# Patient Record
Sex: FEMALE | Race: WHITE | Employment: OTHER | URBAN - NONMETROPOLITAN AREA
[De-identification: names, ages, dates, MRNs, and addresses within clinical notes are randomized per-mention and may not be internally consistent; named-entity substitution may affect disease eponyms.]

---

## 2018-01-27 ENCOUNTER — HOSPITAL ENCOUNTER (EMERGENCY)
Age: 63
Discharge: HOME | End: 2018-01-27
Attending: EMERGENCY MEDICINE

## 2018-01-27 ENCOUNTER — APPOINTMENT (OUTPATIENT)
Dept: GENERAL RADIOLOGY | Age: 63
End: 2018-01-27

## 2018-01-27 VITALS
RESPIRATION RATE: 16 BRPM | TEMPERATURE: 99.4 F | DIASTOLIC BLOOD PRESSURE: 91 MMHG | OXYGEN SATURATION: 94 % | SYSTOLIC BLOOD PRESSURE: 156 MMHG | HEART RATE: 79 BPM

## 2018-01-27 DIAGNOSIS — R05.9 COUGH: Primary | ICD-10-CM

## 2018-01-27 LAB
DIRECT EXAM: NORMAL
Lab: NORMAL
SPECIMEN DESCRIPTION: NORMAL
STATUS: NORMAL

## 2018-01-27 PROCEDURE — 99283 EMERGENCY DEPT VISIT LOW MDM: CPT

## 2018-01-27 PROCEDURE — 87804 INFLUENZA ASSAY W/OPTIC: CPT

## 2018-01-27 RX ORDER — AZITHROMYCIN 250 MG/1
250 TABLET, FILM COATED ORAL DAILY
Qty: 6 TABLET | Refills: 0 | Status: SHIPPED | OUTPATIENT
Start: 2018-01-27

## 2018-01-27 ASSESSMENT — PAIN DESCRIPTION - LOCATION: LOCATION: GENERALIZED

## 2018-01-27 ASSESSMENT — PAIN SCALES - GENERAL: PAINLEVEL_OUTOF10: 7

## 2018-01-27 ASSESSMENT — ENCOUNTER SYMPTOMS
BACK PAIN: 0
COUGH: 1
GASTROINTESTINAL NEGATIVE: 1

## 2018-01-27 ASSESSMENT — PAIN DESCRIPTION - PAIN TYPE: TYPE: ACUTE PAIN

## 2018-01-28 NOTE — ED PROVIDER NOTES
This is a 77-year-old female who presents with flulike symptoms that began this morning. She's had subjective fevers at home cough and sore throat. She does have a headache worse when she is coughing. She denies any chest pain or shortness of breath. Her largest complaint is diffuse myalgias. She has been tolerating by mouth and has a decreased appetite but has been drinking increased fluids. She is urinating without difficulty she denies any nausea or vomiting. Has had multiple sick contacts with similar symptoms. Nurses notes reviewed including family history and social history. Review of Systems   Constitutional: Negative. Respiratory: Positive for cough. Cardiovascular: Negative. Gastrointestinal: Negative. Genitourinary: Negative. Musculoskeletal: Positive for arthralgias and myalgias. Negative for back pain and neck pain. Neurological: Positive for headaches. Hematological: Negative. Physical Exam   Constitutional: She is oriented to person, place, and time and well-developed, well-nourished, and in no distress. HENT:   Head: Normocephalic and atraumatic. Eyes: Conjunctivae and EOM are normal. Pupils are equal, round, and reactive to light. Neck: Normal range of motion. Neck supple. Cardiovascular: Normal rate, regular rhythm and normal heart sounds. Pulmonary/Chest: Effort normal and breath sounds normal.   Abdominal: Soft. Bowel sounds are normal.   Musculoskeletal: Normal range of motion. Neurological: She is alert and oriented to person, place, and time. Gait normal. GCS score is 15. Skin: Skin is warm and dry. Flu swab is negative.   Patient is refusing chest x-ray          Jose Randle MD  01/27/18 2006       Jose Randle MD  01/27/18 2007

## 2022-05-05 ENCOUNTER — HOSPITAL ENCOUNTER (OUTPATIENT)
Age: 67
Setting detail: SPECIMEN
Discharge: HOME | End: 2022-05-05

## 2022-05-05 LAB
ABSOLUTE EOS #: 0.05 K/UL (ref 0–0.44)
ABSOLUTE IMMATURE GRANULOCYTE: 0.08 K/UL (ref 0–0.3)
ABSOLUTE LYMPH #: 2.24 K/UL (ref 1.1–3.7)
ABSOLUTE MONO #: 1.32 K/UL (ref 0.1–1.2)
BASOPHILS # BLD: 0 % (ref 0–2)
BASOPHILS ABSOLUTE: 0.05 K/UL (ref 0–0.2)
EOSINOPHILS RELATIVE PERCENT: 0 % (ref 1–4)
HCT VFR BLD CALC: 39.4 % (ref 36.3–47.1)
HEMOGLOBIN: 12.7 G/DL (ref 11.9–15.1)
IMMATURE GRANULOCYTES: 1 %
LYMPHOCYTES # BLD: 15 % (ref 24–43)
MCH RBC QN AUTO: 31.4 PG (ref 25.2–33.5)
MCHC RBC AUTO-ENTMCNC: 32.2 G/DL (ref 28.4–34.8)
MCV RBC AUTO: 97.3 FL (ref 82.6–102.9)
MONOCYTES # BLD: 9 % (ref 3–12)
NRBC AUTOMATED: 0 PER 100 WBC
PDW BLD-RTO: 13.1 % (ref 11.8–14.4)
PLATELET # BLD: 411 K/UL (ref 138–453)
PMV BLD AUTO: 10.1 FL (ref 8.1–13.5)
RBC # BLD: 4.05 M/UL (ref 3.95–5.11)
SEG NEUTROPHILS: 75 % (ref 36–65)
SEGMENTED NEUTROPHILS ABSOLUTE COUNT: 11.43 K/UL (ref 1.5–8.1)
WBC # BLD: 15.2 K/UL (ref 3.5–11.3)

## 2022-05-06 LAB
ALBUMIN SERPL-MCNC: 4.8 G/DL (ref 3.5–5.2)
ALBUMIN/GLOBULIN RATIO: 1.9 (ref 1–2.5)
ALP BLD-CCNC: 91 U/L (ref 35–104)
ALT SERPL-CCNC: 17 U/L (ref 5–33)
ANION GAP SERPL CALCULATED.3IONS-SCNC: 19 MMOL/L (ref 9–17)
AST SERPL-CCNC: 17 U/L
BILIRUB SERPL-MCNC: 0.27 MG/DL (ref 0.3–1.2)
BUN BLDV-MCNC: 27 MG/DL (ref 8–23)
CALCIUM SERPL-MCNC: 9.6 MG/DL (ref 8.6–10.4)
CHLORIDE BLD-SCNC: 96 MMOL/L (ref 98–107)
CHOLESTEROL, FASTING: 193 MG/DL
CHOLESTEROL/HDL RATIO: 3.4
CO2: 20 MMOL/L (ref 20–31)
CREAT SERPL-MCNC: 1.06 MG/DL (ref 0.5–0.9)
ESTIMATED AVERAGE GLUCOSE: 146 MG/DL
GFR AFRICAN AMERICAN: >60 ML/MIN
GFR NON-AFRICAN AMERICAN: 52 ML/MIN
GFR SERPL CREATININE-BSD FRML MDRD: ABNORMAL ML/MIN/{1.73_M2}
GLUCOSE BLD-MCNC: 101 MG/DL (ref 70–99)
HBA1C MFR BLD: 6.7 % (ref 4–6)
HDLC SERPL-MCNC: 57 MG/DL
LDL CHOLESTEROL: 116 MG/DL (ref 0–130)
POTASSIUM SERPL-SCNC: 4.4 MMOL/L (ref 3.7–5.3)
SODIUM BLD-SCNC: 135 MMOL/L (ref 135–144)
T3 FREE: 4.17 PG/ML (ref 2.02–4.43)
T4 TOTAL: 6.5 UG/DL (ref 4.5–10.9)
TOTAL PROTEIN: 7.3 G/DL (ref 6.4–8.3)
TRIGLYCERIDE, FASTING: 100 MG/DL
TSH SERPL DL<=0.05 MIU/L-ACNC: 3.06 UIU/ML (ref 0.3–5)
VITAMIN D 25-HYDROXY: 57.6 NG/ML

## 2022-08-05 ENCOUNTER — HOSPITAL ENCOUNTER (OUTPATIENT)
Dept: LAB | Age: 67
Setting detail: SPECIMEN
Discharge: HOME | End: 2022-08-05
Payer: COMMERCIAL

## 2022-08-05 PROCEDURE — U0005 INFEC AGEN DETEC AMPLI PROBE: HCPCS

## 2022-08-05 PROCEDURE — C9803 HOPD COVID-19 SPEC COLLECT: HCPCS

## 2022-08-05 PROCEDURE — U0003 INFECTIOUS AGENT DETECTION BY NUCLEIC ACID (DNA OR RNA); SEVERE ACUTE RESPIRATORY SYNDROME CORONAVIRUS 2 (SARS-COV-2) (CORONAVIRUS DISEASE [COVID-19]), AMPLIFIED PROBE TECHNIQUE, MAKING USE OF HIGH THROUGHPUT TECHNOLOGIES AS DESCRIBED BY CMS-2020-01-R: HCPCS

## 2022-08-06 LAB
SARS-COV-2: NORMAL
SARS-COV-2: NOT DETECTED
SOURCE: NORMAL

## 2022-08-12 ENCOUNTER — HOSPITAL ENCOUNTER (OUTPATIENT)
Dept: LAB | Age: 67
Discharge: HOME | End: 2022-08-12
Payer: COMMERCIAL

## 2022-08-12 PROCEDURE — C9803 HOPD COVID-19 SPEC COLLECT: HCPCS

## 2022-08-12 PROCEDURE — U0005 INFEC AGEN DETEC AMPLI PROBE: HCPCS

## 2022-08-12 PROCEDURE — U0003 INFECTIOUS AGENT DETECTION BY NUCLEIC ACID (DNA OR RNA); SEVERE ACUTE RESPIRATORY SYNDROME CORONAVIRUS 2 (SARS-COV-2) (CORONAVIRUS DISEASE [COVID-19]), AMPLIFIED PROBE TECHNIQUE, MAKING USE OF HIGH THROUGHPUT TECHNOLOGIES AS DESCRIBED BY CMS-2020-01-R: HCPCS

## 2022-08-13 LAB
SARS-COV-2: NORMAL
SARS-COV-2: NOT DETECTED
SOURCE: NORMAL

## 2022-12-08 ENCOUNTER — HOSPITAL ENCOUNTER (OUTPATIENT)
Dept: PHYSICAL THERAPY | Age: 67
Setting detail: THERAPIES SERIES
Discharge: HOME | End: 2022-12-08
Payer: COMMERCIAL

## 2022-12-08 PROCEDURE — 97110 THERAPEUTIC EXERCISES: CPT

## 2022-12-08 PROCEDURE — 97140 MANUAL THERAPY 1/> REGIONS: CPT

## 2022-12-08 PROCEDURE — 97161 PT EVAL LOW COMPLEX 20 MIN: CPT

## 2022-12-08 ASSESSMENT — PAIN SCALES - QUEBEC BACK PAIN DISABILITY SCALE
STAND UP FOR 20 TO 30 MINUTES: 3
SLEEP THROUGH THE NIGHT: 2
PULL OR PUSH HEAVY DOORS: 2
TOTAL SCORE: 55
MAKE YOUR BED: 3
GET OUT OF BED: 2
TAKE FOOD OUT OF THE REFRIGERATOR: 2
QUEBEC CMS MODIFIER: CK
BEND OVER TO CLEAN THE BATHTUB: 4
LIFT AND CARRY A HEAVY SUITCASE: 4
RUN ONE BLOCK OR 100M: 5
SIT IN A CHAIR FOR SEVERAL HOURS: 2
REACH UP TO HIGH SHELVES: 1
WALK A FEW BLOCKS OR 300 TO 400M: 3
PUT ON SOCKS OR PANYHOSE: 2
WALK SEVERAL KILOMETERS  OR MILES: 5
THROW A BALL: 5
TURN OVER IN BED: 2
CARRY TWO BAGS OF GROCERIES: 2
CLIMB ONE FLIGHT OF STAIRS: 3
MOVE A CHAIR: 2
RIDE IN A CAR: 1
QUEBEC DISABILITY INDEX: 40-59%

## 2022-12-08 NOTE — PLAN OF CARE
[facility]           Phone: [phone]             Outpatient Physical Therapy  Fax: [phone]                                           Date: 2022  Patient: Rob Damon : 1955 CSN #: [id number]   Referring Physician: Arcadio Hurtado      [x] Plan of Care   [] Updated Plan of Care    Dates of Service to Include: 2022 to 23    Diagnosis:  R hip pain, M25.551    Rehab (Treatment) Diagnosis:  Low back and R hip pain             Onset Date:       Attendance  Total # of Visits to Date: 1 No Show: 0 Canceled Appointment: 0    Assessment  Assessment: Patient is 74year old female with dx of R hip pain who presents with lower back and R hip pain 8/10 at worst after working all day on her feet. Pt stands with lumbar hyperlordosis and B anterior pelvic tilt, Moderate TTP L5/S1, R PSIS, R greater trochanter; (-) B slump, SLR, (-) B CHITO, good pelvic alignment, relief with manual lumbar traction with therapy ball. Patient with decreased lumbar AROM flexion: 6 in from floor with pain and B SB: to knees with no pain. Patient with decreased core strength: 3+/5 and decreased R hip flex: 3/5 compared to L hip flex: 4-/5. Patient to benefit from aquatic physical therapy to offload the spine and the R hip and to improve strength and ROM to return to PLOF. Goals  Short Term Goals  Time Frame for Short Term Goals: 3 weeks  Short Term Goal 1: Patient to initiate HEP for improved lumbar ROM and core strength. Short Term Goal 2: Patient to tolerate 45 min of aquatic exercise to decrease low back and L hip pain. Short Term Goal 3: Patient to be instructed in gentle core and B hip strengthening to improve lumbar stability. Long Term Goals  Time Frame for Long Term Goals : 6 weeks  Long Term Goal 1: Patient to be safe and independent with HEP.   Long Term Goal 2: Patient to have improved core and B hip strength >/=4/5 all planes for improved lumbar stability. Long Term Goal 3: Patient to have improved lumbar AROM flexion: 4in from floor with no increase in pain. Long Term Goal 4: Patient to report >/=75% improvement in symptoms for improved QOL.      Prognosis  Therapy Prognosis: Good    Treatment Plan   Plan Frequency: 2  Plan weeks: 4  [x] HP/CP      [x] Electrical Stim   [x] Therapeutic Exercise      [x] Gait Training  [x] Aquatics   [x] Ultrasound         [x] Patient Education/HEP   [x] Manual Therapy  [] Traction    [x] Neuro-benedict        [x] Soft Tissue Mobs            [] Home TENS  [] Iontophoresis    [] Orthotic casting/fitting      [] Dry Needling  [] Blood Flow Restriction             Electronically signed by: Jose Angel Alexandre PT, DPT    Date: 12/8/2022      ______________________________________ Date: 12/8/2022   Physician Signature

## 2022-12-08 NOTE — PROGRESS NOTES
Phone: [address]          Fax: [phone]                      Outpatient Physical Therapy                                                                      Evaluation  Date: 2022  Patient: Akhil Ley  : 1955  CSN #: [id number]    Referring Physician: Agnela Taylor PA-C     Medical Diagnosis: R hip pain, M25.551    Treatment Diagnosis: Low back and R hip pain  PT Insurance Information: [place]  Total # of Visits Approved: 12   Total # of Visits to Date: 1  No Show: 0  Canceled Appointment: 0     Subjective  Subjective: Patient reports hx of back pain for years and R hip pain started recently. Had xrays for R hip on Friday that showed trochanteric bursitis and liner of CAMERON is wearing down. MRI pending insurance approval. 8/10 pain after work and being on her feet all day. Heat packs and cold packs that provide temporary relief but then it comes right back. Pain medications don't help.   Additional Pertinent Hx: Hx of B CAMERON's, arthritis, HTN, anxiety    Observations:   General Observations  Description: Pt stands with lumbar hyperlordosis and B anterior pelvic tilt, Moderate TTP L5/S1, R PSIS, R greater trochanter; (-) B slump, SLR, (-) B CHITO, good pelvic alignment, relief with manual lumbar traction with therapy ball    Objective    Strength       Strength LLE  L Hip Flexion: 4-/5  L Hip ABduction: 4/5  L Hip ADduction: 4/5  L Knee Flexion: 4/5  L Knee Extension: 4-/5  L Ankle Dorsiflexion: 4/5       Lumbar Assessment     AROM Lumbar Spine   Lumbar spine general AROM: flexion: 6 in from floor, no pain; B SB: to knees, no pain     Special Tests:   Strength RLE  R Hip Flexion: 3/5  R Hip ABduction: 4/5  R Hip ADduction: 4/5  R Knee Flexion: 4/5  R Knee Extension: 4-/5  R Ankle Dorsiflexion: 4-/5  Strength LLE  L Hip Flexion: 4-/5  L Hip ABduction: 4/5  L Hip ADduction: 4/5  L Knee Flexion: 4/5  L Knee Extension: 4-/5  L Ankle Dorsiflexion: 4/5      Exercises:  Exercise 1: HEP: PPT, marching, glut sets, LTR    Manual:  Manual Traction: Gentle manual lumbar traction with therapy ball with relief of pain noted       Functional Outcome Measures  Get out of bed: Somewhat difficult  Sleep through the night: Somewhat difficult  Turn over in bed: Somewhat difficult  Ride in a car: Minimally difficult  Stand up for 20-30 minutes: Fairly difficult  Sit in a chair for several hours: Somewhat difficult  Climb one flight of stairs: Fairly difficult  Walk a few blocks (300-400 m): Fairly difficult  Walk several kilometers - miles: Unable to do  Reach up to high shelves: Minimally difficult  Throw a ball: Unable to do  Run one block (about 100 m): Unable to do  Take food out of the refrigerator: Somewhat difficult  Make your bed: Fairly difficult  Put on socks (pantyhose): Somewhat difficult  Bend over to clean the bathtub: Very difficult  Move a chair: Somewhat difficult  Pull or push heavy doors: Somewhat difficult  Carry two bags of groceries: Somewhat difficult  Lift and carry a heavy suitcase: Very difficult  Sarah Total Score: 55      Assessment  Assessment: Patient is 74year old female with dx of R hip pain who presents with lower back and R hip pain 8/10 at worst after working all day on her feet. Pt stands with lumbar hyperlordosis and B anterior pelvic tilt, Moderate TTP L5/S1, R PSIS, R greater trochanter; (-) B slump, SLR, (-) B HCITO, good pelvic alignment, relief with manual lumbar traction with therapy ball. Patient with decreased lumbar AROM flexion: 6 in from floor with pain and B SB: to knees with no pain. Patient with decreased core strength: 3+/5 and decreased R hip flex: 3/5 compared to L hip flex: 4-/5. Patient to benefit from aquatic physical therapy to offload the spine and the R hip and to improve strength and ROM to return to PLOF.   Therapy Prognosis: Good        Decision Making: Low Complexity    Patient Education  PT eval, POC, HEP   Pt verbalized/demonstrated good understanding:     [X] Yes         [] No, pt required further clarification. Goals  Short Term Goals  Time Frame for Short Term Goals: 3 weeks  Short Term Goal 1: Patient to initiate HEP for improved lumbar ROM and core strength. Short Term Goal 2: Patient to tolerate 45 min of aquatic exercise to decrease low back and L hip pain. Short Term Goal 3: Patient to be instructed in gentle core and B hip strengthening to improve lumbar stability. Long Term Goals  Time Frame for Long Term Goals : 6 weeks  Long Term Goal 1: Patient to be safe and independent with HEP. Long Term Goal 2: Patient to have improved core and B hip strength >/=4/5 all planes for improved lumbar stability. Long Term Goal 3: Patient to have improved lumbar AROM flexion: 4in from floor with no increase in pain. Long Term Goal 4: Patient to report >/=75% improvement in symptoms for improved QOL.         Minutes Tracking:  Time In: 1330  Time Out: 1410  Minutes: 40  Timed Code Treatment Minutes: [address], PT, DPT    12/8/2022

## 2022-12-12 ENCOUNTER — HOSPITAL ENCOUNTER (OUTPATIENT)
Dept: PHYSICAL THERAPY | Age: 67
Setting detail: THERAPIES SERIES
Discharge: HOME | End: 2022-12-12
Payer: COMMERCIAL

## 2022-12-12 PROCEDURE — 97113 AQUATIC THERAPY/EXERCISES: CPT

## 2022-12-12 NOTE — PROGRESS NOTES
Phone: [facility]           Fax: [phone]                           Outpatient Physical Therapy                                                                            Daily Note    Patient: Delpha Hodgkins : 1955  CSN #: [id number]   Referring Physician: Aby Peguero PA-C    Date: 2022    Diagnosis: R hip pain, M25.551  Treatment Diagnosis: Low back and R hip pain    PT Insurance Information: [place]  Total # of Visits Approved: 12 Per Physician Order  Total # of Visits to Date: 2  No Show: 0  Canceled Appointment: 0      Pre-Treatment Pain:  9/10  Subjective: Pt reports she just got off work and pain is really bad right now, 9/10. Pt reports she can pin point it \"just to the right of my lowest vertebrae. \"    Exercises:  Exercise 1: HEP: PPT, marching, glut sets, LTR  Exercise 2: Aquatics to reduce pain in offloaded enviornment in order to improve ROM and strength:  Exercise 3: Shallow water walking forward, lateral and backwards x 4 laps each + 3 laps fwd at end of tx. Exercise 4: Sink therex x 10, FSU/LSU x 10 semi deep,  Exercise 5: Seated jet massage 5 minutes to redce muscle tension and pain, seated B LE therex x 10  Exercise 6: HS stretch 20\" x 3    Assessment  Assessment: Initiated light core and LE therex this date, with fair+ tolerance. Pt reported a decrease in pain to 5/10 following tx. Will continue to progress as tolerated. Activity Tolerance  Activity Tolerance: Patient limited by pain, Patient limited by fatigue    Patient Education  Patient Education: Initiated aquatic therapy this date with instrution on safety protocol and expectations. Pt verbalized/demonstrated good understanding:     [x] Yes         [] No, pt required further clarification.     Post Treatment Pain:  5/10      Plan  Plan Frequency: 2  Plan weeks: 4       Goals  (Total # of Visits to Date: 2)      Short Term Goals  Time Frame for Short Term Goals: 3 weeks  Short Term Goal 1: Patient to initiate HEP for improved lumbar ROM and core strength. Short Term Goal 2: Patient to tolerate 45 min of aquatic exercise to decrease low back and L hip pain. Short Term Goal 3: Patient to be instructed in gentle core and B hip strengthening to improve lumbar stability--met    Long Term Goals  Time Frame for Long Term Goals : 6 weeks  Long Term Goal 1: Patient to be safe and independent with HEP. Long Term Goal 2: Patient to have improved core and B hip strength >/=4/5 all planes for improved lumbar stability. Long Term Goal 3: Patient to have improved lumbar AROM flexion: 4in from floor with no increase in pain. Long Term Goal 4: Patient to report >/=75% improvement in symptoms for improved QOL.     Minutes Tracking:  Time In: 8904  Time Out: 1622  Minutes: 43  Timed Code Treatment Minutes: [address] 26     Date: 12/12/2022

## 2022-12-13 ENCOUNTER — HOSPITAL ENCOUNTER (OUTPATIENT)
Dept: PHYSICAL THERAPY | Age: 67
Setting detail: THERAPIES SERIES
Discharge: HOME | End: 2022-12-13
Payer: COMMERCIAL

## 2022-12-13 PROCEDURE — 97113 AQUATIC THERAPY/EXERCISES: CPT

## 2022-12-13 NOTE — PROGRESS NOTES
Phone: [facility]           Fax: [phone]                           Outpatient Physical Therapy                                                                            Daily Note    Patient: Lupe Puente : 1955  CSN #: [id number]   Referring Physician: Rochelle Hudson PA-C    Date: 2022    Diagnosis: R hip pain, M25.551  Treatment Diagnosis: Low back and R hip pain    PT Insurance Information: [place]  Total # of Visits Approved: 12 Per Physician Order  Total # of Visits to Date: 3  No Show: 0  Canceled Appointment: 0      Pre-Treatment Pain:  7/10  Subjective: Pt reports current LBP and R hip pain 7/10. \"Not too bad. \"    Exercises:  Exercise 1: HEP: PPT, marching, glut sets, LTR  Exercise 2: Aquatics to reduce pain in offloaded enviornment in order to improve ROM and strength:  Exercise 3: Shallow water walking forward, lateral and backwards x 4 laps each + 3 laps fwd at end of tx. Exercise 4: Sink therex x 10, FSU/LSU x 10 semi deep, 4 way hip  B LE x 10 BTB  Exercise 5: Seated jet massage 5 minutes to redce muscle tension and pain, seated B LE therex x 15, STS 5# x 10  Exercise 6: HS stretch 20\" x 3  Exercise 7: PTP rows/ext x 15, 90/90 with double dumbbells x 15  Exercise 8: Deep water bike with 3# x 3 minutes, deep well traction x 4 minutes    Assessment  Assessment: Continued to progress core and LE therex in offloaded enviornment as tolerated. Initiated deep well bike and traction with relief reported, 5/10 post tx. Activity Tolerance  Activity Tolerance: Patient limited by pain, Patient limited by fatigue    Patient Education  Patient Education: Initiated new therex--good return demonstration noted. Pt verbalized/demonstrated good understanding:     [x] Yes         [] No, pt required further clarification.     Post Treatment Pain:  5/10      Plan  Plan Frequency: 2  Plan weeks: 4       Goals  (Total # of Visits to Date: 3)      Short Term Goals  Time Frame for Short Term Goals: 3 weeks  Short Term Goal 1: Patient to initiate HEP for improved lumbar ROM and core strength. Short Term Goal 2: Patient to tolerate 45 min of aquatic exercise to decrease low back and L hip pain. Short Term Goal 3: Patient to be instructed in gentle core and B hip strengthening to improve lumbar stability--met    Long Term Goals  Time Frame for Long Term Goals : 6 weeks  Long Term Goal 1: Patient to be safe and independent with HEP. Long Term Goal 2: Patient to have improved core and B hip strength >/=4/5 all planes for improved lumbar stability. Long Term Goal 3: Patient to have improved lumbar AROM flexion: 4in from floor with no increase in pain. Long Term Goal 4: Patient to report >/=75% improvement in symptoms for improved QOL.     Minutes Tracking:  Time In: 1230  Time Out: 1330  Minutes: 60  Timed Code Treatment Minutes: [id number] [id number]     Date: 12/13/2022

## 2022-12-22 ENCOUNTER — APPOINTMENT (OUTPATIENT)
Dept: PHYSICAL THERAPY | Age: 67
End: 2022-12-22
Payer: COMMERCIAL

## 2022-12-27 ENCOUNTER — HOSPITAL ENCOUNTER (OUTPATIENT)
Dept: PHYSICAL THERAPY | Age: 67
Setting detail: THERAPIES SERIES
Discharge: HOME | End: 2022-12-27
Payer: COMMERCIAL

## 2022-12-27 NOTE — PROGRESS NOTES
[facility]  Inpatient/Observation/Outpatient Rehabilitation    Date: 2022  Patient Name: Delpha Hodgkins       [] Inpatient Acute/Observation       [x]  Outpatient  : 1955        [x] Pt cancelled due to:  [] No Reason Given   [] Sick/ill   [x] Other:  Pt left a voicemail stating she was \"Unable to make it. \"    Therapist/Assistant will attempt to see this patient, at our earliest opportunity.        Nolan Lanier, [place] [address] Date: 2022

## 2022-12-29 ENCOUNTER — HOSPITAL ENCOUNTER (OUTPATIENT)
Dept: PHYSICAL THERAPY | Age: 67
Setting detail: THERAPIES SERIES
Discharge: HOME | End: 2022-12-29
Payer: COMMERCIAL

## 2022-12-29 NOTE — PROGRESS NOTES
[facility]  Inpatient/Observation/Outpatient Rehabilitation    Date: 2022  Patient Name: Maria C Marcos       [] Inpatient Acute/Observation       [x]  Outpatient  : 1955     [x] Pt cancelled due to:  [] No Reason Given   [] Sick/ill   [x] Other:  No reason given to  for today's cancelled visit, however PTA did speak with patient earlier in the week and she stated she had some medical issues going on that she is seeking a doctor's advice for. Therapist/Assistant will attempt to see this patient, at our earliest opportunity.        Kasandra Chew, [place] [address] Date: 2022

## 2023-05-18 LAB
ESTIMATED AVERAGE GLUCOSE: 134
HBA1C MFR BLD: 6.3 %
T3 FREE: 1.87

## 2023-06-06 ENCOUNTER — HOSPITAL ENCOUNTER (OUTPATIENT)
Age: 68
Discharge: HOME | End: 2023-06-06
Payer: MEDICARE

## 2023-06-06 VITALS — OXYGEN SATURATION: 94 % | SYSTOLIC BLOOD PRESSURE: 146 MMHG | HEART RATE: 64 BPM | DIASTOLIC BLOOD PRESSURE: 73 MMHG

## 2023-06-06 VITALS
OXYGEN SATURATION: 97 % | HEART RATE: 63 BPM | SYSTOLIC BLOOD PRESSURE: 121 MMHG | TEMPERATURE: 97.9 F | DIASTOLIC BLOOD PRESSURE: 89 MMHG | RESPIRATION RATE: 16 BRPM

## 2023-06-06 VITALS
OXYGEN SATURATION: 95 % | DIASTOLIC BLOOD PRESSURE: 71 MMHG | HEART RATE: 60 BPM | SYSTOLIC BLOOD PRESSURE: 141 MMHG | RESPIRATION RATE: 20 BRPM

## 2023-06-06 VITALS — RESPIRATION RATE: 20 BRPM

## 2023-06-06 DIAGNOSIS — M19.011: ICD-10-CM

## 2023-06-06 DIAGNOSIS — M19.012: Primary | ICD-10-CM

## 2023-06-06 DIAGNOSIS — Z79.84: ICD-10-CM

## 2023-06-06 PROCEDURE — 82948 REAGENT STRIP/BLOOD GLUCOSE: CPT

## 2023-06-06 PROCEDURE — 20610 DRAIN/INJ JOINT/BURSA W/O US: CPT

## 2023-06-06 PROCEDURE — 77002 NEEDLE LOCALIZATION BY XRAY: CPT

## 2023-06-06 PROCEDURE — 36415 COLL VENOUS BLD VENIPUNCTURE: CPT

## 2023-06-06 NOTE — W.PM.PROCNOT
Date of procedure: 06/06/23
Procedure: 
Bilateral Shoulder Injection
Pre & postoperative diagnosis: pain secondary to bilateral shoulder osteoarthritis. 

Immediate complications none. 
Anesthesia: 2% lidocaine plain for skin wheal.

After informed consent was obtained, patient brought to the OR placed in the supine position.  Skin overlying the area was prepped and draped using betadine. 
25-gauge 1/2 inch needle was used for skin wheal over the medial aspect of the joint identified under fluoroscopy.  Omnipaque dye was used to confirm needle tip placement within the glenohumeral joint space 0.5 mL use of the injection.  Subsequently 
Depomedrol 40mg and Marcaine 0.25% 4ml was injected into the space.  No indication of intravascular or intraneuronal  needle tip placement or injection was noted post procedure.  The needle was removed, patient transferred to Recovery room in stable 
condition to discharged home after  meeting criteria.

Surgeon: Guerita Esteves
Condition: stable

## 2023-06-06 NOTE — P.ON_ITS
Date of procedure: 06/06/23    
Procedure:     
Bilateral Shoulder Injection    
Pre & postoperative diagnosis: pain secondary to bilateral shoulder ost  
eoarthritis.     
    
Immediate complications none.     
Anesthesia: 2% lidocaine plain for skin wheal.    
    
After informed consent was obtained, patient brought to the OR placed in the   
supine position.  Skin overlying the area was prepped and draped using betadine.  
    
25-gauge 1/2 inch needle was used for skin wheal over the medial aspect of the   
joint identified under fluoroscopy.  Omnipaque dye was used to confirm needle   
tip placement within the glenohumeral joint space 0.5 mL use of the injection.    
Subsequently Depomedrol 40mg and Marcaine 0.25% 4ml was injected into the space.  
 No indication of intravascular or intraneuronal  needle tip placement or   
injection was noted post procedure.  The needle was removed, patient transferred  
to Recovery room in stable condition to discharged home after  meeting criteria.    
    
Surgeon: Guerita Esteves    
Condition: stable

## 2023-06-27 ENCOUNTER — HOSPITAL ENCOUNTER
Age: 68
Discharge: HOME | End: 2023-06-27
Payer: MEDICARE

## 2023-06-27 DIAGNOSIS — M47.817: Primary | ICD-10-CM

## 2023-06-27 DIAGNOSIS — M54.50: ICD-10-CM

## 2023-06-27 DIAGNOSIS — G89.29: ICD-10-CM

## 2023-06-27 PROCEDURE — G0463 HOSPITAL OUTPT CLINIC VISIT: HCPCS

## 2023-06-27 NOTE — CONS_ITS
CONSULTATION DATE: ??06/27/2023  
  
TO:? Aram Veliz M.D.   
  
HISTORY:? Patient presents today complaining of 5-7/10 pain in her lower back, 
predominantly on the right side.? It was sharp, stabbing pain, increased with 
activities such as standing, walking and performing transitioning maneuvers.? 
She feels most comfortable in the semi-recumbent position.? Denies any change in
bowel and bladder habits or new sensorimotor changes in the lower extremities.? 
Since her last visit, she discontinued her baclofen, Flexeril, diclofenac and 
Norflex.?   
  
EXAM:? Her examination is notable for patient having no clinical radiculopathy 
or myelopathy involving the lower extremities.? Patient had severe pain with 
lumbar facet joint loading maneuvers, occurring on the right at L4-5 and L5-S1 
with associated myofascial spasm of the lumbar paravertebral muscles.?   
  
IMPRESSION:? Our impression is patient appears to have chronic pain secondary to
lumbosacral spondylosis with facet loading pain clinically.? Her last rhizotomy 
at the L3, 4 and 5 level was performed on 08/09/2022.? She reports she had 
significant reduction in pain symptoms starting in the immediate post procedural
period, and she had significant relief until approximately 4-6 weeks ago in the 
lumbar area.?   
  
RECOMMENDATIONS:? At this point, it would be appropriate to repeat her rhizotomy
using radiofrequency ablation of the right L3, L4 and L5 medial branches for 
pain relief.? In the interim, I have provided her with Tylenol #3, one pill 
daily to b.i.d. p.r.n. as tolerated.? I have given her 45 pills to last one 
month?s time.  
  
As part of providing excellent, safe, comprehensive care, the following was 
completed at our patient's visit:  
  
1. A medication reconciliation and review to ensure accurate knowledge of 
current/active medications, including asking our patients to inform us about any
over-the-counter medications or herbal remedies/nutritional 
supplements/alternative remedies.  
  
2. A review to specifically ensure our patients have had annual screening for: 
elevated body mass index (BMI, see intake chart for exact total), tobacco use, 
screening for depression, and screening for unhealthy alcohol use.? When 
screening is concerning, patients are provided with education and the specific 
recommendation to discuss the concerning health issue and treatment options with
their primary care provider.   
 ABENA

## 2023-08-15 ENCOUNTER — HOSPITAL ENCOUNTER (OUTPATIENT)
Age: 68
Discharge: HOME | End: 2023-08-15
Payer: MEDICARE

## 2023-08-15 VITALS
OXYGEN SATURATION: 99 % | DIASTOLIC BLOOD PRESSURE: 84 MMHG | RESPIRATION RATE: 16 BRPM | TEMPERATURE: 97.8 F | HEART RATE: 67 BPM | SYSTOLIC BLOOD PRESSURE: 126 MMHG

## 2023-08-15 VITALS
TEMPERATURE: 97.88 F | DIASTOLIC BLOOD PRESSURE: 79 MMHG | OXYGEN SATURATION: 97 % | RESPIRATION RATE: 16 BRPM | SYSTOLIC BLOOD PRESSURE: 127 MMHG | HEART RATE: 68 BPM

## 2023-08-15 VITALS
HEART RATE: 57 BPM | RESPIRATION RATE: 16 BRPM | TEMPERATURE: 97.7 F | DIASTOLIC BLOOD PRESSURE: 77 MMHG | SYSTOLIC BLOOD PRESSURE: 135 MMHG | OXYGEN SATURATION: 95 %

## 2023-08-15 DIAGNOSIS — E66.9: ICD-10-CM

## 2023-08-15 DIAGNOSIS — M47.816: Primary | ICD-10-CM

## 2023-08-15 DIAGNOSIS — M19.90: ICD-10-CM

## 2023-08-15 DIAGNOSIS — K21.9: ICD-10-CM

## 2023-08-15 DIAGNOSIS — I10: ICD-10-CM

## 2023-08-15 DIAGNOSIS — E78.00: ICD-10-CM

## 2023-08-15 DIAGNOSIS — F41.9: ICD-10-CM

## 2023-08-15 PROCEDURE — 36416 COLLJ CAPILLARY BLOOD SPEC: CPT

## 2023-08-15 PROCEDURE — 36415 COLL VENOUS BLD VENIPUNCTURE: CPT

## 2023-08-15 PROCEDURE — 82948 REAGENT STRIP/BLOOD GLUCOSE: CPT

## 2023-08-15 PROCEDURE — 64636 DESTROY L/S FACET JNT ADDL: CPT

## 2023-08-15 PROCEDURE — 64635 DESTROY LUMB/SAC FACET JNT: CPT

## 2023-08-15 NOTE — P.ON_ITS
Date of procedure: 08/15/23    
Pre-op diagnosis: lumbar spondylosis    
Post-op diagnosis: same as pre-op    
Procedure:     
Right lumbar 3,4,5 Radiofrequency ablation    
Under fluoroscopic guidance    
Rhizotomy was created using radio frequency ablation at 80?C for 90 seconds 1 to  
2 lesions created at each site.    
Post lesioning injection of 2 mL each of 0.25% Marcaine and 2% lidocaine with   
Depo-Medrol 40mg. 0.5 to 1 mL injected at each site    
    
IV in place yes    
If Intravenous fluids: NS at KVO    
Anesthesia local 2% lidocaine for     
Anesthesia Other:  MAC    
Timeout process compliant    
    
After informed consent obtained.Patient brought to the procedure room placed in   
the prone position skin overlying the area was prepped and draped in a sterile   
fashion using betadine. 25 gauge needle was used to create a skin wheal over   
each of the targeted areas utilizing 2% lidocaine. A rhizotomy needle with a 10   
mm active tip was inserted over each of the anesthetized areas and directed   
towards each of the medial branches accomplished under fluoroscopic guidance.   
after encountering the same we had positive sensory stimulation, negative motor   
stimulation was noted. lesions were then created. Post lesioning, steroid   
solution was injected needles removed. Patient was transferred to recovery room   
in stable condition to be discharged home after meeting criteria.    
    
Anesthesia: MAC    
Surgeon: Guerita Esteves    
Condition: stable

## 2023-09-05 ENCOUNTER — HOSPITAL ENCOUNTER
Age: 68
Discharge: HOME | End: 2023-09-05
Payer: MEDICARE

## 2023-09-05 DIAGNOSIS — R30.0: Primary | ICD-10-CM

## 2023-09-05 LAB — GLUCOSE URINE UA: NEGATIVE MG/DL

## 2023-09-05 PROCEDURE — 81003 URINALYSIS AUTO W/O SCOPE: CPT

## 2023-09-05 PROCEDURE — 87086 URINE CULTURE/COLONY COUNT: CPT

## 2023-09-14 ENCOUNTER — HOSPITAL ENCOUNTER
Dept: HOSPITAL 101 - CARD | Age: 68
Discharge: HOME | End: 2023-09-14
Payer: MEDICARE

## 2023-09-14 ENCOUNTER — HOSPITAL ENCOUNTER
Dept: HOSPITAL 101 - PM | Age: 68
Discharge: HOME | End: 2023-09-14
Payer: MEDICARE

## 2023-09-14 DIAGNOSIS — I34.0: ICD-10-CM

## 2023-09-14 DIAGNOSIS — M62.838: ICD-10-CM

## 2023-09-14 DIAGNOSIS — M47.816: Primary | ICD-10-CM

## 2023-09-14 DIAGNOSIS — Z01.818: ICD-10-CM

## 2023-09-14 DIAGNOSIS — M54.50: ICD-10-CM

## 2023-09-14 DIAGNOSIS — E66.9: ICD-10-CM

## 2023-09-14 PROCEDURE — G0463 HOSPITAL OUTPT CLINIC VISIT: HCPCS

## 2023-09-14 PROCEDURE — 93306 TTE W/DOPPLER COMPLETE: CPT

## 2023-09-14 NOTE — P.CN_ITS
Consult Note: HPI    
Data of Consult    
Patient: known to practice within the last 3 years    
Requesting Physician:     
Nguyen Robertson NP    
    
Primary Care Provider:     
Aram Veliz MD    
    
Consult Narrative    
Reason for consult: procedure f/u    
Narrative:     
Sherrie Price a pleasant 67 year old female presents to the office for   
follow up on chronic low back pain. Recently underwent a Right lumbar 3,4,5   
Radiofrequency ablation with no pain relief or functional improvement.     
Today rating pain 8/10.    
cc::     
CC: Nguyen Robertson NP    
    
    
Review of Systems    
    
    
ROS      
    
 Status of ROS 10 or more systems reviewed and unremarkable except as noted in   
history and below       
    
 Musculoskeletal Reports: back pain       
    
    
PFSH    
PFSH    
Medical History (Reviewed 09/14/23 @ 14:49 by Nguyen Robertson NP)    
    
    
    
Surgical History (Reviewed 09/14/23 @ 14:49 by Nguyen Robertson NP)    
    
    
    
Social History (Reviewed 09/14/23 @ 14:49 by Nguyen Robetrson NP)    
Smoking status:  Never smoker     
    
    
    
Meds    
Home Medications and Allergies    
                                Home Medications    
    
    
    
 Medication  Instructions  Recorded  Confirmed  Type    
     
acetaminophen 325 mg tablet 650 mg PO Q4H PRN pain 06/01/23 08/15/23 History    
    
(Tylenol)        
     
ascorbic acid (vitamin C) 1,000 mg 1 g PO QDAY 06/01/23 08/15/23 History    
    
capsule        
     
aspirin 81 mg tablet,delayed 81 mg PO QDAY 06/01/23 08/15/23 History    
    
release        
     
biotin 5 mg capsule 5 mg PO QDAY 06/01/23 08/15/23 History    
     
cholecalciferol (vitamin D3) 50 50 mcg PO BID 06/01/23 08/15/23 History    
    
mcg (2,000 unit) capsule        
     
cinnamon bark 500 mg capsule 1,000 mg PO QDAY 06/01/23 08/15/23 History    
    
(Cinnamon)        
     
cranberry 500 mg capsule 500 mg PO BID 06/01/23 08/15/23 History    
     
garlic 500 mg capsule 500 mg PO QDAY 06/01/23 08/15/23 History    
     
hydrochlorothiazide 12.5 mg capsule 12.5 mg PO QDAY 06/01/23 08/15/23 History    
     
losartan 25 mg tablet 25 mg PO QDAY 06/01/23 08/15/23 History    
     
metformin 500 mg tablet 500 mg PO BID 06/01/23 08/15/23 History    
     
metoprolol succinate 100 mg 100 mg PO BID 06/01/23 08/15/23 History    
    
tablet,extended release 24 hr        
     
omega 3-dha-epa-fish oil 1,200 mg 1,250 cap PO .daily 06/01/23 08/15/23 History    
    
(144 mg-216 mg) capsule (Fish Oil)        
     
pantoprazole 40 mg tablet,delayed 40 mg PO QAM 06/01/23 08/15/23 History    
    
release (Protonix)        
     
liothyronine 5 mcg tablet 5 mcg PO 06/06/23  History    
     
acetaminophen 300 mg-codeine 30 mg 1 tab PO BID 06/27/23 08/15/23 History    
    
tablet        
     
baclofen 10 mg tablet 10 mg PO BID 06/27/23 08/15/23 History    
     
magnesium oxide 400 mg PO DAILY 08/15/23 08/15/23 History    
     
red beet root 250 mg-sour cherry tab PO 08/15/23  History    
    
extract 0.5 mg chewable tablet        
     
zinc 50 mg tablet 50 mg PO DAILY 08/15/23 08/15/23 History    
    
    
    
                                    Allergies    
    
    
    
Allergy/AdvReac Type Severity Reaction Status Date / Time    
     
hydrocodone [From Vicodin] Allergy Mild ITCHY Verified 08/15/23 06:53    
     
nickel Allergy Mild RASH, ITCHY Verified 08/15/23 06:53    
     
oxycodone [From Percocet] Allergy Mild ITCHY Verified 08/15/23 06:53    
     
latex AdvReac Mild Blister Verified 08/15/23 06:53    
     
PCN Allergy Mild Rash Uncoded 08/15/23 06:53    
    
    
    
    
Exam    
Constitutional    
Documenting provider has reviewed patient's vital signs: yes    
Common normals: no apparent distress, oriented x3, healthy appearing, alert and   
well nourished    
General appearance: cooperative    
[facility]    
Common normals: normocephalic, hearing grossly normal bilaterally and moist oral  
mucous membranes    
Head and scalp: normocephalic    
Eye    
Common normals: PERRL    
Pupil: PERRL    
Neck & C-Spine    
Common normals: full ROM    
General: normal visual inspection    
Chest    
Common normals: inspection of chest normal    
Respiratory    
Common normals: normal respiratory effort, no retractions and no use of   
accessory muscles    
Back & Pelvis    
Lumbar spine/lower back: ROM limited, pain with ROM and straight leg raise   
negative bilaterally    
Other:     
predominately axial low back pain on right side without radiculopathy. no   
trigger points identified    
Neuro    
Common normals: oriented x3, CN's II-XII intact bilaterally, moves all   
extremities, no focal motor deficits, no sensory deficits noted and deep tendon   
reflexes 2+ bilaterally    
Sensorium/orientation: alert    
Gait (neuro): antalgic    
Motor exam: strength 5/5 throughout and no movement abnormalities noted    
Psych    
Common normals: mental status grossly normal, thought process normal,   
cooperative, affect normal, speech normal and activity/motor behavior normal    
Speech: normal speech    
Thought process: normal thought process    
    
Results    
Additional Findings    
Additional findings:     
I have checked an [place]ARRS report on this patient today and there are no   
aberrancies noted in the prescribing history.??    
A drug screen was completed and reviewed within the last year, and if there has   
not been a drug screen completed we ordered one today to monitor higher risk,   
state monitored pain medication use.     
As part of providing excellent, safe, comprehensive care, the following was   
completed at our patient's visit:    
1. A medication reconciliation and review to ensure accurate knowledge of   
current/active medications, including asking our patients to inform us about any  
over-the-counter medications or herbal remedies/nutritional   
supplements/alternative remedies.    
2. A review to specifically ensure our patients have had annual screening for:   
elevated body mass index (BMI), tobacco use, screening for depression, and sc  
reening for unhealthy alcohol use. When screening is concerning, patients are   
provided with education and the specific recommendation to discuss the   
concerning health issue and treatment options with their primary care provider.    
    
Assessment and Plan    
Assessment and Plan    
(1) Lumbar spondylosis:     
(2) Low back pain:     
(3) Muscle spasm:     
(4) Obesity:     
      Assessment and Plan:     
The patient was counseled that proper dietary changes and consistent   
participation in a home exercise plan can lead to weight loss. Weight loss can   
help to improve functionality in patients with chronic pain.?    
    
Plan    
patient did not respond to most recent procedure right l3/4 4/5 RFA    
discussed discrepancies in OARRS report, if patient continues to fill with other  
physicians after surgical repair of left shoulder management is done she will be  
switched to a NNCP (she had been receiving narcotics from PCP due to a   
misunderstanding)    
start duloxetine 30mg HS after surgery, can increase to 60mg hs after four weeks  
if no side effects     
f/u 6 weeks

## 2023-09-14 NOTE — CA_ITS
Patient:     RACQUEL LOZADA     Exam Date:     2023 
:     1955          Gender:F     MRN:     [id number] 
Ordering :     HUSSAIN MARCOS CNP     Admission #:     [id number] 
Family :          Order #:     [id number] 
     CLICK HERE TO VIEW EXAM 
  
ECHOCARDIOGRAM REPORT 
  
PROCEDURE:     CA ECHO DOPPLER COMPLETE 
  
INDICATIONS:     PRE OP 
  
COMPARISON:     None. 
  
DESCRIPTION:     COMPLETE ECHOCARDIOGRAM Real-time transthoracic  
echocardiography with 2D, M-mode, spectral and color flow Doppler performed.  
  
QUALITY:     Technical quality was good.   
LEFT VENTRICLE:     Normal chamber size. Mild concentric left ventricular  
hypertrophy.  
LV EF:     Global left ventricular systolic function is normal. Calculated  
left ventricular ejection fraction is 61% 
DIASTOLIC:     Normal diastolic function. 
ATRIAL SEPTUM:     Inadequately seen. 
LEFT ATRIUM:     Moderate dilatation.  
RIGHT ATRIUM:     Mild dilatation.  
RIGHT VENTRICLE:     Normal chamber size. Normal right ventricular systolic  
function.     
TRICUSPID VALVE:     Normal mobility and thickness. No stenosis with trivial  
regurgitation. No evidence of pulmonary hypertension. RVSP 27mmHg      
MITRAL VALVE:     Normal mobility and thickness.   No evidence of mitral valve  
stenosis.  Mitral annular calcification. Mild mitral regurgitation.      
AORTIC VALVE:     Normal trileaflet appearance. No visible sclerosis.  Normal  
leaflet mobility.  No evidence of aortic valve stenosis. Trivial aortic  
regurgitation.      
AORTIC ROOT:     Normal diameter and appearance.      
PULMONIC VALVE:     Normal thickness and mobility. No stenosis. No  
regurgitation.       
PERICARDIUM:     No evidence of pericardial effusion.        
IVC:     Collapses with inspirations. Normal size.      
      
               
  
CONCLUSION:           Global left ventricular systolic function is normal;  
visually estimated ejection fraction is 60 to 65%.  Mild left ventricular  
hypertrophy.  Biatrial enlargement.  The right ventricle is normal in size and  
systolic function.  Mild mitral regurgitation. 
  
  
Adult Echocardiography Procedure Report 
Left Ventricle  
LVEDD (3.7 - 5.6 cm):     4.16 cm 
LVESD (2.2 - 4.0 cm):     2.45 cm 
LVIVS thickness (0.6 - 1.2 cm):     1.24 cm 
LVPW thickness (0.5 - 1.0 cm):     0.96 cm 
e':     0.11 m/s 
E - e':     7.82 
LVOT Max Gradient:     5.87 mm[Hg] 
LVOT Area (cm2):     1.21 m/s 
Peak Velocity (LVOT):     1.21 m/s 
Mean Velocity (LVOT):     0.77 m/s 
LVOT Diameter     2.07 cm 
Left Ventricular Ejection Fraction:     60.92 % 
Left Atrium  
LA Volume Index (2D A2C):     52.79 ml/m2 
Left Atrium Systolic Dimension:     4.20 cm 
Mitral Valve  
MV E to A Ratio:     0.92 
Mitral Valve A-Wave Peak Velocity:     0.92 m/s 
Mitral Valve E-Wave Peak Velocity:     0.84 m/s 
Right Ventricle  
RV Internal Diastolic Dimension:     3.38 cm 
Aorta  
AO Root Diam:     2.90 cm 
Ascending Ao Diam:     2.97 cm 
Aortic Valve  
Deceleration [place]:     0.79 m/s2 
Pressure Half-Time:     1.19 s 
Peak Velocity(Antegrade Flow):     1.64 m/s 
Peak Gradient(Antegrade Flow):     10.71 mm[Hg] 
Mean Velocity(Antegrade Flow):     1.04 m/s 
Mean Gradient(Antegrade Flow):     5.08 mm[Hg] 
Velocity Time Integral:     38.94 cm 
Tricuspid Valve  
Peak Velocity (Regurgitant Flow):     2.21 m/s, 2.16 m/s, 2.43 m/s 
Pulmonic Valve  
Mean Gradient:     2.27 mm[Hg], 3.14 mm[Hg] 
Mean Velocity:     0.71 m/s, 0.83 m/s 
Peak Velocity:     1.10 m/s 
Peak Gradient:     4.13 mm[Hg], 5.69 mm[Hg] 
Right Atrium  
Right Atrium Systolic Pressure:     78.52 ml, 78.52 ml  
  
  
  
  
Dictated by: Rico Roberto M.D. on 9/15/2023 at 10:10      
Approved by: Rico Roberto M.D. on 9/15/2023 at 10:12

## 2023-09-14 NOTE — PM.CN
Consult Note: HPI
Data of Consult
Patient: known to practice within the last 3 years
Requesting Physician: 
Nguyen Robertson NP

Primary Care Provider: 
Aram Veliz MD

Consult Narrative
Reason for consult: procedure f/u
Narrative: 
Sherrie Price a pleasant 67 year old female presents to the office for follow up on chronic low back pain. Recently underwent a Right lumbar 3,4,5 Radiofrequency ablation with no pain relief or functional improvement. 
Today rating pain 8/10.
cc:: 
CC: Nguyen Robertson NP


Review of Systems
ROS  
 Status of ROS 10 or more systems reviewed and unremarkable except as noted in history and below   
 Musculoskeletal Reports: back pain   

PFSH
PFSH
Medical History (Reviewed 09/14/23 @ 14:49 by Nguyen Robertson NP)



Surgical History (Reviewed 09/14/23 @ 14:49 by Nguyen Robertson NP)



Social History (Reviewed 09/14/23 @ 14:49 by Nguyen Robertson NP)
Smoking status:  Never smoker 



Meds
Home Medications and Allergies
Home Medications

 Medication  Instructions  Recorded  Confirmed  Type
acetaminophen 325 mg tablet 650 mg PO Q4H PRN pain 06/01/23 08/15/23 History
(Tylenol)    
ascorbic acid (vitamin C) 1,000 mg 1 g PO QDAY 06/01/23 08/15/23 History
capsule    
aspirin 81 mg tablet,delayed 81 mg PO QDAY 06/01/23 08/15/23 History
release    
biotin 5 mg capsule 5 mg PO QDAY 06/01/23 08/15/23 History
cholecalciferol (vitamin D3) 50 50 mcg PO BID 06/01/23 08/15/23 History
mcg (2,000 unit) capsule    
cinnamon bark 500 mg capsule 1,000 mg PO QDAY 06/01/23 08/15/23 History
(Cinnamon)    
cranberry 500 mg capsule 500 mg PO BID 06/01/23 08/15/23 History
garlic 500 mg capsule 500 mg PO QDAY 06/01/23 08/15/23 History
hydrochlorothiazide 12.5 mg capsule 12.5 mg PO QDAY 06/01/23 08/15/23 History
losartan 25 mg tablet 25 mg PO QDAY 06/01/23 08/15/23 History
metformin 500 mg tablet 500 mg PO BID 06/01/23 08/15/23 History
metoprolol succinate 100 mg 100 mg PO BID 06/01/23 08/15/23 History
tablet,extended release 24 hr    
omega 3-dha-epa-fish oil 1,200 mg 1,250 cap PO .daily 06/01/23 08/15/23 History
(144 mg-216 mg) capsule (Fish Oil)    
pantoprazole 40 mg tablet,delayed 40 mg PO QAM 06/01/23 08/15/23 History
release (Protonix)    
liothyronine 5 mcg tablet 5 mcg PO 06/06/23  History
acetaminophen 300 mg-codeine 30 mg 1 tab PO BID 06/27/23 08/15/23 History
tablet    
baclofen 10 mg tablet 10 mg PO BID 06/27/23 08/15/23 History
magnesium oxide 400 mg PO DAILY 08/15/23 08/15/23 History
red beet root 250 mg-sour cherry tab PO 08/15/23  History
extract 0.5 mg chewable tablet    
zinc 50 mg tablet 50 mg PO DAILY 08/15/23 08/15/23 History


Allergies

Allergy/AdvReac Type Severity Reaction Status Date / Time
hydrocodone [From Vicodin] Allergy Mild ITCHY Verified 08/15/23 06:53
nickel Allergy Mild RASH, ITCHY Verified 08/15/23 06:53
oxycodone [From Percocet] Allergy Mild ITCHY Verified 08/15/23 06:53
latex AdvReac Mild Blister Verified 08/15/23 06:53
PCN Allergy Mild Rash Uncoded 08/15/23 06:53



Exam
Constitutional
Documenting provider has reviewed patient's vital signs: yes
Common normals: no apparent distress, oriented x3, healthy appearing, alert and well nourished
General appearance: cooperative
[facility]
Common normals: normocephalic, hearing grossly normal bilaterally and moist oral mucous membranes
Head and scalp: normocephalic
Eye
Common normals: PERRL
Pupil: PERRL
Neck & C-Spine
Common normals: full ROM
General: normal visual inspection
Chest
Common normals: inspection of chest normal
Respiratory
Common normals: normal respiratory effort, no retractions and no use of accessory muscles
Back & Pelvis
Lumbar spine/lower back: ROM limited, pain with ROM and straight leg raise negative bilaterally
Other: 
predominately axial low back pain on right side without radiculopathy. no trigger points identified
Neuro
Common normals: oriented x3, CN's II-XII intact bilaterally, moves all extremities, no focal motor deficits, no sensory deficits noted and deep tendon reflexes 2+ bilaterally
Sensorium/orientation: alert
Gait (neuro): antalgic
Motor exam: strength 5/5 throughout and no movement abnormalities noted
Psych
Common normals: mental status grossly normal, thought process normal, cooperative, affect normal, speech normal and activity/motor behavior normal
Speech: normal speech
Thought process: normal thought process

Results
Additional Findings
Additional findings: 
I have checked an OARRS report on this patient today and there are no aberrancies noted in the prescribing history.??
A drug screen was completed and reviewed within the last year, and if there has not been a drug screen completed we ordered one today to monitor higher risk, state monitored pain medication use. 
As part of providing excellent, safe, comprehensive care, the following was completed at our patient's visit:
1. A medication reconciliation and review to ensure accurate knowledge of current/active medications, including asking our patients to inform us about any over-the-counter medications or herbal remedies/nutritional supplements/alternative remedies.
2. A review to specifically ensure our patients have had annual screening for: elevated body mass index (BMI), tobacco use, screening for depression, and screening for unhealthy alcohol use. When screening is concerning, patients are provided with 
education and the specific recommendation to discuss the concerning health issue and treatment options with their primary care provider.

Assessment and Plan
Assessment and Plan
(1) Lumbar spondylosis: 
(2) Low back pain: 
(3) Muscle spasm: 
(4) Obesity: 
      Assessment and Plan: 
The patient was counseled that proper dietary changes and consistent participation in a home exercise plan can lead to weight loss. Weight loss can help to improve functionality in patients with chronic pain.?

Plan
patient did not respond to most recent procedure right l3/4 4/5 RFA
discussed discrepancies in OARRS report, if patient continues to fill with other physicians after surgical repair of left shoulder management is done she will be switched to a NNCP (she had been receiving narcotics from PCP due to a misunderstanding)
start duloxetine 30mg HS after surgery, can increase to 60mg hs after four weeks if no side effects 
f/u 6 weeks

## 2023-10-25 ENCOUNTER — HOSPITAL ENCOUNTER
Age: 68
Discharge: HOME | End: 2023-10-25
Payer: MEDICARE

## 2023-10-25 DIAGNOSIS — M62.838: ICD-10-CM

## 2023-10-25 DIAGNOSIS — M51.36: ICD-10-CM

## 2023-10-25 DIAGNOSIS — M54.50: ICD-10-CM

## 2023-10-25 DIAGNOSIS — M47.816: Primary | ICD-10-CM

## 2023-10-25 PROCEDURE — G0463 HOSPITAL OUTPT CLINIC VISIT: HCPCS

## 2023-10-25 NOTE — PM.CN
Consult Note: HPI
Data of Consult
Patient: known to practice within the last 3 years
Requesting Physician: 
Nguyen Robertson NP

Primary Care Provider: 
Aram Veliz MD

Consult Narrative
Reason for consult: procedure f/u
Narrative: 
Sherrie Price a pleasant 67 year old female presents to the office for follow up on chronic low back pain. Today rating pain 10/10 in low back. Patient is now cleared by orthopedics and can be medically managed by our office. Patient would like 
to discuss restarting opioid therapy as she previously benefitted from this. 
cc:: 
CC: Nguyen Robertson NP


Review of Systems
ROS  
 Status of ROS 10 or more systems reviewed and unremarkable except as noted in history and below   
 Musculoskeletal Reports: back pain   

PFSH
PFSH
Medical History (Reviewed 10/25/23 @ 14:03 by Nguyen Robertson NP)

Acid reflux
 ?K21.9 - Gastro-esophageal reflux disease without esophagitis (ICD-10)
Anxiety
 ?F41.9 - Anxiety disorder, unspecified (ICD-10)
Bronchitis
 ?J40 - Bronchitis, not specified as acute or chronic (ICD-10)
High cholesterol
 ?E78.00 - Pure hypercholesterolemia, unspecified (ICD-10)
Hypertension
 ?I10 - Essential (primary) hypertension (ICD-10)
Low back pain
 ?M54.50 - Low back pain, unspecified (ICD-10)
Obesity
 ?E66.9 - Obesity, unspecified (ICD-10)
Osteoarthritis
 ?M19.90 - Unspecified osteoarthritis, unspecified site (ICD-10)


Surgical History (Reviewed 10/25/23 @ 14:03 by Nguyen Robertson NP)

H/O total hip arthroplasty
 ?Z96.649 - Presence of unspecified artificial hip joint (ICD-10)
H/O total hip arthroplasty
 ?Z96.649 - Presence of unspecified artificial hip joint (ICD-10)
History of cholecystectomy
 ?Z90.49 - Acquired absence of other specified parts of digestive tract (ICD-10)
History of excision of lesion
 ?Z98.890 - Other specified postprocedural states (ICD-10)
 ?Z87.2 - Personal history of diseases of the skin and subcutaneous tissue (ICD-10)
History of tonsillectomy
 ?Z90.89 - Acquired absence of other organs (ICD-10)
Hx of adenoidectomy
 ?Z90.89 - Acquired absence of other organs (ICD-10)
S/P rotator cuff repair
 ?Z98.890 - Other specified postprocedural states (ICD-10)


Social History (Reviewed 10/25/23 @ 14:03 by Nguyen Robertson NP)
Smoking status:  Never smoker 



Meds
Home Medications and Allergies
Home Medications

 Medication  Instructions  Recorded  Confirmed  Type
acetaminophen 325 mg tablet 650 mg PO Q4H PRN pain 06/01/23 08/15/23 History
(Tylenol)    
ascorbic acid (vitamin C) 1,000 mg 1 g PO QDAY 06/01/23 08/15/23 History
capsule    
aspirin 81 mg tablet,delayed 81 mg PO QDAY 06/01/23 08/15/23 History
release    
biotin 5 mg capsule 5 mg PO QDAY 06/01/23 08/15/23 History
cholecalciferol (vitamin D3) 50 50 mcg PO BID 06/01/23 08/15/23 History
mcg (2,000 unit) capsule    
cinnamon bark 500 mg capsule 1,000 mg PO QDAY 06/01/23 08/15/23 History
(Cinnamon)    
cranberry 500 mg capsule 500 mg PO BID 06/01/23 08/15/23 History
garlic 500 mg capsule 500 mg PO QDAY 06/01/23 08/15/23 History
hydrochlorothiazide 12.5 mg capsule 12.5 mg PO QDAY 06/01/23 08/15/23 History
losartan 25 mg tablet 25 mg PO QDAY 06/01/23 08/15/23 History
metformin 500 mg tablet 500 mg PO BID 06/01/23 08/15/23 History
metoprolol succinate 100 mg 100 mg PO BID 06/01/23 08/15/23 History
tablet,extended release 24 hr    
omega 3-dha-epa-fish oil 1,200 mg 1,250 cap PO .daily 06/01/23 08/15/23 History
(144 mg-216 mg) capsule (Fish Oil)    
pantoprazole 40 mg tablet,delayed 40 mg PO QAM 06/01/23 08/15/23 History
release (Protonix)    
liothyronine 5 mcg tablet 5 mcg PO 06/06/23  History
acetaminophen 300 mg-codeine 30 mg 1 tab PO BID 06/27/23 08/15/23 History
tablet    
baclofen 10 mg tablet 10 mg PO BID 06/27/23 08/15/23 History
magnesium oxide 400 mg PO DAILY 08/15/23 08/15/23 History
red beet root 250 mg-sour cherry tab PO 08/15/23  History
extract 0.5 mg chewable tablet    
zinc 50 mg tablet 50 mg PO DAILY 08/15/23 08/15/23 History


Allergies

Allergy/AdvReac Type Severity Reaction Status Date / Time
hydrocodone [From Vicodin] Allergy Mild ITCHY Verified 08/15/23 06:53
nickel Allergy Mild RASH, ITCHY Verified 08/15/23 06:53
oxycodone [From Percocet] Allergy Mild ITCHY Verified 08/15/23 06:53
latex AdvReac Mild Blister Verified 08/15/23 06:53
PCN Allergy Mild Rash Uncoded 08/15/23 06:53



Exam
Constitutional
Documenting provider has reviewed patient's vital signs: yes
Common normals: no apparent distress, oriented x3, healthy appearing, alert and well nourished
General appearance: cooperative
[facility]
Common normals: normocephalic, hearing grossly normal bilaterally and moist oral mucous membranes
Head and scalp: normocephalic
Eye
Common normals: PERRL
Pupil: PERRL
Neck & C-Spine
Common normals: full ROM
General: normal visual inspection
Chest
Common normals: inspection of chest normal
Respiratory
Common normals: normal respiratory effort, no retractions and no use of accessory muscles
Back & Pelvis
Lumbar spine/lower back: ROM limited, pain with ROM and straight leg raise negative bilaterally
Other: 
continues to have low back pain on right side 
weakness and increase in pain with standing
Extremity
Common normals: normal to inspection and full ROM
Neuro
Common normals: oriented x3, CN's II-XII intact bilaterally, moves all extremities, no focal motor deficits, no sensory deficits noted and deep tendon reflexes 2+ bilaterally
Sensorium/orientation: alert
Gait (neuro): antalgic
Motor exam: strength 5/5 throughout and no movement abnormalities noted
Psych
Common normals: mental status grossly normal, thought process normal, cooperative, affect normal, speech normal and activity/motor behavior normal
Speech: normal speech
Thought process: normal thought process

Assessment and Plan
Assessment and Plan
(1) Lumbar spondylosis: 
(2) Low back pain: 
(3) Muscle spasm: 
(4) Lumbar degenerative disc disease: 

Plan
-right L4-5 L5-S1 TFESI with Dr Vizcarra based on chronic pain and MRI findings
-pta reviewed and signed
-yearly UDS today, should be negative for opioids
-continue duloxetine 60mg daily, will consider increasing dose in the future
-restart tylenol #3 QD-BID PRN moderate severe pain
-narcan discussed and prescribed
-f/u 2 weeks after injection

## 2023-10-25 NOTE — P.CN_ITS
Consult Note: HPI    
Data of Consult    
Patient: known to practice within the last 3 years    
Requesting Physician:     
Nguyen Robertson NP    
    
Primary Care Provider:     
Aram Veliz MD    
    
Consult Narrative    
Reason for consult: procedure f/u    
Narrative:     
Sherrie Price a pleasant 67 year old female presents to the office for   
follow up on chronic low back pain. Today rating pain 10/10 in low back. Patient  
is now cleared by orthopedics and can be medically managed by our office.   
Patient would like to discuss restarting opioid therapy as she previously   
benefitted from this.     
cc::     
CC: Nguyen Robertson NP    
    
    
Review of Systems    
    
    
ROS      
    
 Status of ROS 10 or more systems reviewed and unremarkable except as noted in   
history and below       
    
 Musculoskeletal Reports: back pain       
    
    
PFSH    
PFSH    
Medical History (Reviewed 10/25/23 @ 14:03 by Nguyen Robertson NP)    
    
Acid reflux    
   ?K21.9 - Gastro-esophageal reflux disease without esophagitis (ICD-10)    
Anxiety    
   ?F41.9 - Anxiety disorder, unspecified (ICD-10)    
Bronchitis    
   ?J40 - Bronchitis, not specified as acute or chronic (ICD-10)    
High cholesterol    
   ?E78.00 - Pure hypercholesterolemia, unspecified (ICD-10)    
Hypertension    
   ?I10 - Essential (primary) hypertension (ICD-10)    
Low back pain    
   ?M54.50 - Low back pain, unspecified (ICD-10)    
Obesity    
   ?E66.9 - Obesity, unspecified (ICD-10)    
Osteoarthritis    
   ?M19.90 - Unspecified osteoarthritis, unspecified site (ICD-10)    
    
    
Surgical History (Reviewed 10/25/23 @ 14:03 by Nguyen Robertson NP)    
    
H/O total hip arthroplasty    
   ?Z96.649 - Presence of unspecified artificial hip joint (ICD-10)    
H/O total hip arthroplasty    
   ?Z96.649 - Presence of unspecified artificial hip joint (ICD-10)    
History of cholecystectomy    
   ?Z90.49 - Acquired absence of other specified parts of digestive tract (ICD-  
   10)    
History of excision of lesion    
   ?Z98.890 - Other specified postprocedural states (ICD-10)    
   ?Z87.2 - Personal history of diseases of the skin and subcutaneous tissue   
   (ICD-10)    
History of tonsillectomy    
   ?Z90.89 - Acquired absence of other organs (ICD-10)    
Hx of adenoidectomy    
   ?Z90.89 - Acquired absence of other organs (ICD-10)    
S/P rotator cuff repair    
   ?Z98.890 - Other specified postprocedural states (ICD-10)    
    
    
Social History (Reviewed 10/25/23 @ 14:03 by Nguyen Robertson NP)    
Smoking status:  Never smoker     
    
    
    
Meds    
Home Medications and Allergies    
                                Home Medications    
    
    
    
 Medication  Instructions  Recorded  Confirmed  Type    
     
acetaminophen 325 mg tablet 650 mg PO Q4H PRN pain 06/01/23 08/15/23 History    
    
(Tylenol)        
     
ascorbic acid (vitamin C) 1,000 mg 1 g PO QDAY 06/01/23 08/15/23 History    
    
capsule        
     
aspirin 81 mg tablet,delayed 81 mg PO QDAY 06/01/23 08/15/23 History    
    
release        
     
biotin 5 mg capsule 5 mg PO QDAY 06/01/23 08/15/23 History    
     
cholecalciferol (vitamin D3) 50 50 mcg PO BID 06/01/23 08/15/23 History    
    
mcg (2,000 unit) capsule        
     
cinnamon bark 500 mg capsule 1,000 mg PO QDAY 06/01/23 08/15/23 History    
    
(Cinnamon)        
     
cranberry 500 mg capsule 500 mg PO BID 06/01/23 08/15/23 History    
     
garlic 500 mg capsule 500 mg PO QDAY 06/01/23 08/15/23 History    
     
hydrochlorothiazide 12.5 mg capsule 12.5 mg PO QDAY 06/01/23 08/15/23 History    
     
losartan 25 mg tablet 25 mg PO QDAY 06/01/23 08/15/23 History    
     
metformin 500 mg tablet 500 mg PO BID 06/01/23 08/15/23 History    
     
metoprolol succinate 100 mg 100 mg PO BID 06/01/23 08/15/23 History    
    
tablet,extended release 24 hr        
     
omega 3-dha-epa-fish oil 1,200 mg 1,250 cap PO .daily 06/01/23 08/15/23 History    
    
(144 mg-216 mg) capsule (Fish Oil)        
     
pantoprazole 40 mg tablet,delayed 40 mg PO QAM 06/01/23 08/15/23 History    
    
release (Protonix)        
     
liothyronine 5 mcg tablet 5 mcg PO 06/06/23  History    
     
acetaminophen 300 mg-codeine 30 mg 1 tab PO BID 06/27/23 08/15/23 History    
    
tablet        
     
baclofen 10 mg tablet 10 mg PO BID 06/27/23 08/15/23 History    
     
magnesium oxide 400 mg PO DAILY 08/15/23 08/15/23 History    
     
red beet root 250 mg-sour cherry tab PO 08/15/23  History    
    
extract 0.5 mg chewable tablet        
     
zinc 50 mg tablet 50 mg PO DAILY 08/15/23 08/15/23 History    
    
    
    
                                    Allergies    
    
    
    
Allergy/AdvReac Type Severity Reaction Status Date / Time    
     
hydrocodone [From Vicodin] Allergy Mild ITCHY Verified 08/15/23 06:53    
     
nickel Allergy Mild RASH, ITCHY Verified 08/15/23 06:53    
     
oxycodone [From Percocet] Allergy Mild ITCHY Verified 08/15/23 06:53    
     
latex AdvReac Mild Blister Verified 08/15/23 06:53    
     
PCN Allergy Mild Rash Uncoded 08/15/23 06:53    
    
    
    
    
Exam    
Constitutional    
Documenting provider has reviewed patient's vital signs: yes    
Common normals: no apparent distress, oriented x3, healthy appearing, alert and   
well nourished    
General appearance: cooperative    
HENMT    
Common normals: normocephalic, hearing grossly normal bilaterally and moist oral  
mucous membranes    
Head and scalp: normocephalic    
Eye    
Common normals: PERRL    
Pupil: PERRL    
Neck & C-Spine    
Common normals: full ROM    
General: normal visual inspection    
Chest    
Common normals: inspection of chest normal    
Respiratory    
Common normals: normal respiratory effort, no retractions and no use of   
accessory muscles    
Back & Pelvis    
Lumbar spine/lower back: ROM limited, pain with ROM and straight leg raise   
negative bilaterally    
Other:     
continues to have low back pain on right side     
weakness and increase in pain with standing    
Extremity    
Common normals: normal to inspection and full ROM    
Neuro    
Common normals: oriented x3, CN's II-XII intact bilaterally, moves all   
extremities, no focal motor deficits, no sensory deficits noted and deep tendon   
reflexes 2+ bilaterally    
Sensorium/orientation: alert    
Gait (neuro): antalgic    
Motor exam: strength 5/5 throughout and no movement abnormalities noted    
Psych    
Common normals: mental status grossly normal, thought process normal,   
cooperative, affect normal, speech normal and activity/motor behavior normal    
Speech: normal speech    
Thought process: normal thought process    
    
Assessment and Plan    
Assessment and Plan    
(1) Lumbar spondylosis:     
(2) Low back pain:     
(3) Muscle spasm:     
(4) Lumbar degenerative disc disease:     
    
Plan    
-right L4-5 L5-S1 TFESI with Dr Vizcarra based on chronic pain and MRI   
findings    
-pta reviewed and signed    
-yearly UDS today, should be negative for opioids    
-continue duloxetine 60mg daily, will consider increasing dose in the future    
-restart tylenol #3 QD-BID PRN moderate severe pain    
-narcan discussed and prescribed    
-f/u 2 weeks after injection

## 2023-11-06 ENCOUNTER — HOSPITAL ENCOUNTER (OUTPATIENT)
Age: 68
Discharge: HOME | End: 2023-11-06
Payer: MEDICARE

## 2023-11-06 VITALS
OXYGEN SATURATION: 98 % | HEART RATE: 61 BPM | TEMPERATURE: 97.16 F | RESPIRATION RATE: 16 BRPM | DIASTOLIC BLOOD PRESSURE: 87 MMHG | SYSTOLIC BLOOD PRESSURE: 142 MMHG

## 2023-11-06 VITALS
SYSTOLIC BLOOD PRESSURE: 150 MMHG | DIASTOLIC BLOOD PRESSURE: 82 MMHG | HEART RATE: 60 BPM | OXYGEN SATURATION: 95 % | RESPIRATION RATE: 18 BRPM

## 2023-11-06 VITALS
RESPIRATION RATE: 18 BRPM | HEART RATE: 63 BPM | OXYGEN SATURATION: 94 % | DIASTOLIC BLOOD PRESSURE: 81 MMHG | SYSTOLIC BLOOD PRESSURE: 171 MMHG

## 2023-11-06 DIAGNOSIS — M48.062: Primary | ICD-10-CM

## 2023-11-06 DIAGNOSIS — Z79.82: ICD-10-CM

## 2023-11-06 DIAGNOSIS — Z79.899: ICD-10-CM

## 2023-11-06 DIAGNOSIS — Z79.84: ICD-10-CM

## 2023-11-06 PROCEDURE — 64484 NJX AA&/STRD TFRM EPI L/S EA: CPT

## 2023-11-06 PROCEDURE — 64483 NJX AA&/STRD TFRM EPI L/S 1: CPT

## 2023-11-06 PROCEDURE — 82948 REAGENT STRIP/BLOOD GLUCOSE: CPT

## 2023-11-06 PROCEDURE — 36415 COLL VENOUS BLD VENIPUNCTURE: CPT

## 2023-11-06 NOTE — W.PM.PROCNOT
Date of procedure: 11/06/23
Pre-op diagnosis: Lumbar stenosis with neurogenic claudication
Post-op diagnosis: same as pre-op
Procedure: 
Procedure: Right L4-5, L5-S1 transforaminal epidural steroid injection
Medications: Bupivacaine 0.25% 2cc, kenalog 80mg

The patient was seen and examined in the preoperative holding area.? Informed consent was obtained and placed on the chart.? Patient was brought to the medical procedure unit and placed in the prone position where a timeout was completed verifying 
the correct patient, procedure site, position, and planned special equipment using sterile aseptic technique.? Under direct fluoroscopic visualization a 25-gauge Quincke tipped spinal needle was advanced to the designated neural foramen where 
contrast dye was injected to show adequate spread.? The needle was inserted at level right L4-5. There was no evidence of vascular or adverse uptake.? Epidural spread was appreciated.? The above-mentioned injectate was then placed in a 1.5 mL 
aliquot preceded by negative aspiration.? The needle was removed. The needle was inserted and the procedure repeated at level right L5-S1.? The surgery site was covered.? Patient was taken to the postprocedural recovery area and monitored for an 
appropriate length of time before found suitable for discharge in the accompaniment of a responsible adult.
Anesthesia: Local
Surgeon: Delmis Vizcarra
Pathology: none sent
Condition: stable
Disposition: no change

## 2023-11-06 NOTE — P.ON_ITS
Date of procedure: 11/06/23    
Pre-op diagnosis: Lumbar stenosis with neurogenic claudication    
Post-op diagnosis: same as pre-op    
Procedure:     
Procedure: Right L4-5, L5-S1 transforaminal epidural steroid injection    
Medications: Bupivacaine 0.25% 2cc, kenalog 80mg    
    
The patient was seen and examined in the preoperative holding area.? Informed   
consent was obtained and placed on the chart.? Patient was brought to the   
medical procedure unit and placed in the prone position where a timeout was   
completed verifying the correct patient, procedure site, position, and planned   
special equipment using sterile aseptic technique.? Under direct fluoroscopic   
visualization a 25-gauge Quincke tipped spinal needle was advanced to the   
designated neural foramen where contrast dye was injected to show adequate   
spread.? The needle was inserted at level right L4-5. There was no evidence of   
vascular or adverse uptake.? Epidural spread was appreciated.? The above-  
mentioned injectate was then placed in a 1.5 mL aliquot preceded by negative   
aspiration.? The needle was removed. The needle was inserted and the procedure   
repeated at level right L5-S1.? The surgery site was covered.? Patient was taken  
to the postprocedural recovery area and monitored for an appropriate length of   
time before found suitable for discharge in the accompaniment of a responsible   
adult.    
Anesthesia: Local    
Surgeon: Delmis Vizcarra    
Pathology: none sent    
Condition: stable    
Disposition: no change

## 2023-11-16 ENCOUNTER — HOSPITAL ENCOUNTER
Dept: HOSPITAL 101 - PM | Age: 68
Discharge: HOME | End: 2023-11-16
Payer: MEDICARE

## 2023-11-16 DIAGNOSIS — M51.36: Primary | ICD-10-CM

## 2023-11-16 DIAGNOSIS — Z79.891: ICD-10-CM

## 2023-11-16 DIAGNOSIS — M62.838: ICD-10-CM

## 2023-11-16 DIAGNOSIS — M47.816: ICD-10-CM

## 2023-11-16 PROCEDURE — G0463 HOSPITAL OUTPT CLINIC VISIT: HCPCS

## 2023-11-16 NOTE — P.CN_ITS
Consult Note: HPI    
Data of Consult    
Patient: known to practice within the last 3 years    
Requesting Physician:     
Nguyen Robertson NP    
    
Primary Care Provider:     
Aram Veliz MD    
    
Consult Narrative    
Reason for consult: f/u    
Narrative:     
Sherrie Price a pleasant 67 year old female presents for evaluation and   
management of chronic low back pain. Pain 5-6/10 in low back without   
radiculopathy. Patient reporting 50-60% pain relief and functional improvement   
ongoing. Patient reports this is the best she has felt in a long time and this   
injection worked really well for her. Patient benefitting from current   
medication regimen, has not needed tylenol #3 or NSAIDs since injection.     
cc::     
CC: Nguyen Robertson NP    
    
    
Review of Systems    
    
    
ROS      
    
 Status of ROS 10 or more systems reviewed and unremarkable except as noted in   
history and below       
    
 Musculoskeletal Reports: back pain       
    
    
PFSH    
PFSH    
Medical History (Reviewed 10/25/23 @ 14:03 by Nguyen Robertson NP)    
    
Acid reflux    
   ?K21.9 - Gastro-esophageal reflux disease without esophagitis (ICD-10)    
Anxiety    
   ?F41.9 - Anxiety disorder, unspecified (ICD-10)    
Bronchitis    
   ?J40 - Bronchitis, not specified as acute or chronic (ICD-10)    
High cholesterol    
   ?E78.00 - Pure hypercholesterolemia, unspecified (ICD-10)    
Hypertension    
   ?I10 - Essential (primary) hypertension (ICD-10)    
Low back pain    
   ?M54.50 - Low back pain, unspecified (ICD-10)    
Obesity    
   ?E66.9 - Obesity, unspecified (ICD-10)    
Osteoarthritis    
   ?M19.90 - Unspecified osteoarthritis, unspecified site (ICD-10)    
    
    
Surgical History (Reviewed 11/16/23 @ 14:26 by Nguyen Robertson NP)    
    
H/O total hip arthroplasty    
   ?Z96.649 - Presence of unspecified artificial hip joint (ICD-10)    
H/O total hip arthroplasty    
   ?Z96.649 - Presence of unspecified artificial hip joint (ICD-10)    
History of cholecystectomy    
   ?Z90.49 - Acquired absence of other specified parts of digestive tract (ICD-  
   10)    
History of excision of lesion    
   ?Z98.890 - Other specified postprocedural states (ICD-10)    
   ?Z87.2 - Personal history of diseases of the skin and subcutaneous tissue   
   (ICD-10)    
History of tonsillectomy    
   ?Z90.89 - Acquired absence of other organs (ICD-10)    
Hx of adenoidectomy    
   ?Z90.89 - Acquired absence of other organs (ICD-10)    
S/P rotator cuff repair    
   ?Z98.890 - Other specified postprocedural states (ICD-10)    
    
    
Social History (Reviewed 11/16/23 @ 14:26 by Nguyen Robertson NP)    
Smoking status:  Never smoker     
    
    
    
Meds    
Home Medications and Allergies    
                                Home Medications    
    
    
    
 Medication  Instructions  Recorded  Confirmed  Type    
     
acetaminophen 325 mg tablet 650 mg PO Q4H PRN pain 06/01/23 11/06/23 History    
    
(Tylenol)        
     
ascorbic acid (vitamin C) 1,000 mg 1 g PO QDAY 06/01/23 11/06/23 History    
    
capsule        
     
aspirin 81 mg tablet,delayed 81 mg PO QDAY 06/01/23 11/06/23 History    
    
release        
     
biotin 5 mg capsule 5 mg PO QDAY 06/01/23 11/06/23 History    
     
cholecalciferol (vitamin D3) 50 50 mcg PO BID 06/01/23 11/06/23 History    
    
mcg (2,000 unit) capsule        
     
cinnamon bark 500 mg capsule 1,000 mg PO QDAY 06/01/23 11/06/23 History    
    
(Cinnamon)        
     
cranberry 500 mg capsule 500 mg PO BID 06/01/23 11/06/23 History    
     
garlic 500 mg capsule 500 mg PO QDAY 06/01/23 11/06/23 History    
     
hydrochlorothiazide 12.5 mg capsule 12.5 mg PO QDAY 06/01/23 11/06/23 History    
     
losartan 25 mg tablet 25 mg PO QDAY 06/01/23 11/06/23 History    
     
metformin 500 mg tablet 500 mg PO BID 06/01/23 11/06/23 History    
     
metoprolol succinate 100 mg 100 mg PO BID 06/01/23 11/06/23 History    
    
tablet,extended release 24 hr        
     
omega 3-dha-epa-fish oil 1,200 mg 1,250 cap PO .daily 06/01/23 11/06/23 History    
    
(144 mg-216 mg) capsule (Fish Oil)        
     
pantoprazole 40 mg tablet,delayed 40 mg PO QAM 06/01/23 11/06/23 History    
    
release (Protonix)        
     
liothyronine 5 mcg tablet 5 mcg PO 06/06/23  History    
     
baclofen 10 mg tablet 10 mg PO BID 06/27/23 11/06/23 History    
     
magnesium oxide 400 mg PO DAILY 08/15/23 11/06/23 History    
     
red beet root 250 mg-sour cherry tab PO 08/15/23  History    
    
extract 0.5 mg chewable tablet        
     
zinc 50 mg tablet 50 mg PO DAILY 08/15/23 11/06/23 History    
     
acetaminophen 300 mg-codeine 30 mg 1 tab PO BID PRN pain #45 tabs 10/25/23   
11/06/23 Rx    
    
tablet        
     
duloxetine 60 mg capsule,delayed 60 mg PO DAILY #30 caps 10/25/23 11/06/23 Rx    
    
release        
     
naloxone 4 mg/actuation nasal 4 mg intranasal Q3M PRN opioid 10/25/23 11/06/23   
Rx    
    
spray (Narcan) overdose #2 ea       
     
duloxetine 60 mg capsule,delayed 60 mg PO DAILY 10/26/23 11/06/23 History    
    
release (Cymbalta)        
    
    
    
                                    Allergies    
    
    
    
Allergy/AdvReac Type Severity Reaction Status Date / Time    
     
hydrocodone [From Vicodin] Allergy Mild ITCHY Verified 11/06/23 08:19    
     
nickel Allergy Mild RASH, ITCHY Verified 11/06/23 08:19    
     
oxycodone [From Percocet] Allergy Mild ITCHY Verified 11/06/23 08:19    
     
latex AdvReac Mild Blister Verified 11/06/23 08:19    
     
PCN Allergy Mild Rash Uncoded 11/06/23 08:19    
    
    
    
    
Exam    
Constitutional    
Documenting provider has reviewed patient's vital signs: yes    
Common normals: no apparent distress, oriented x3, healthy appearing, alert and   
well nourished    
General appearance: cooperative    
[facility]    
Common normals: normocephalic, hearing grossly normal bilaterally and moist oral  
mucous membranes    
Head and scalp: normocephalic    
Eye    
Common normals: PERRL    
Pupil: PERRL    
Neck & C-Spine    
Common normals: full ROM    
General: normal visual inspection    
Chest    
Common normals: inspection of chest normal    
Respiratory    
Common normals: normal respiratory effort, no retractions and no use of   
accessory muscles    
Back & Pelvis    
Lumbar spine/lower back: pain with ROM and straight leg raise negative   
bilaterally    
Extremity    
Common normals: normal to inspection and full ROM    
Neuro    
Common normals: oriented x3, CN's II-XII intact bilaterally, moves all   
extremities, no focal motor deficits, no sensory deficits noted and deep tendon   
reflexes 2+ bilaterally    
Sensorium/orientation: alert    
Gait (neuro): antalgic    
Motor exam: strength 5/5 throughout and no movement abnormalities noted    
Psych    
Common normals: mental status grossly normal, thought process normal,   
cooperative, affect normal, speech normal and activity/motor behavior normal    
Speech: normal speech    
Thought process: normal thought process    
    
Assessment and Plan    
Assessment and Plan    
(1) Lumbar degenerative disc disease:     
(2) Muscle spasm:     
(3) Lumbar spondylosis:     
(4) Chronic prescription opiate use:     
      Assessment and Plan:     
I feel these medications are improving the patient's quality of life and allow   
them to tolerate activities of daily living as well as participate in   
recreational activity.? The patient does not report intolerable side effects.   
The patient is NOT opioid naive and non-pharmacologic and non-opioid treatment   
has failed to significantly relieve the patient's pain and improve functio  
nality. The patient has a diagnosis that is related to a somatic or visceral   
pain etiology. ?    
??    
I reviewed with the patient the potential risks and side effects with the use   
of?    
opioid medications including but not limited to respiratory depression,?    
sedation, and even death. I verified the patient has access to naloxone should?    
these effects occur. I advised the patient to avoid the use of any other?    
sedation substances including alcohol, THC, and benzodiazepines while?    
taking opioid medications due to the risk of compounding side effects and?    
detrimental outcomes. I reviewed the , pain treatment agreement, urine?    
drug screen, and opioid start talking forms. The patient was advised to let?    
their family know they had Naloxone in case they would need to administer?    
the medication.?    
??    
A drug screen was completed within the last year, and no aberrancies were noted   
regarding their use of controlled substances. The patient understands they are   
subject to the terms and conditions of the pain contract that they have signed.   
?    
??    
I have checked an [employer] report on this patient today and there are no   
aberrancies noted in the prescribing history.?    
    
Plan    
continue current medication regimen    
f/u 3 months to evaluate need for repeat TFESI

## 2023-11-16 NOTE — PM.CN
Consult Note: HPI
Data of Consult
Patient: known to practice within the last 3 years
Requesting Physician: 
Nguyen Robertson NP

Primary Care Provider: 
Aram Veliz MD

Consult Narrative
Reason for consult: f/u
Narrative: 
Sherrie Price a pleasant 67 year old female presents for evaluation and management of chronic low back pain. Pain 5-6/10 in low back without radiculopathy. Patient reporting 50-60% pain relief and functional improvement ongoing. Patient reports 
this is the best she has felt in a long time and this injection worked really well for her. Patient benefitting from current medication regimen, has not needed tylenol #3 or NSAIDs since injection. 
cc:: 
CC: Nguyen Robertson NP


Review of Systems
ROS  
 Status of ROS 10 or more systems reviewed and unremarkable except as noted in history and below   
 Musculoskeletal Reports: back pain   

PFSH
PFSH
Medical History (Reviewed 10/25/23 @ 14:03 by Nguyen Robertson NP)

Acid reflux
 ?K21.9 - Gastro-esophageal reflux disease without esophagitis (ICD-10)
Anxiety
 ?F41.9 - Anxiety disorder, unspecified (ICD-10)
Bronchitis
 ?J40 - Bronchitis, not specified as acute or chronic (ICD-10)
High cholesterol
 ?E78.00 - Pure hypercholesterolemia, unspecified (ICD-10)
Hypertension
 ?I10 - Essential (primary) hypertension (ICD-10)
Low back pain
 ?M54.50 - Low back pain, unspecified (ICD-10)
Obesity
 ?E66.9 - Obesity, unspecified (ICD-10)
Osteoarthritis
 ?M19.90 - Unspecified osteoarthritis, unspecified site (ICD-10)


Surgical History (Reviewed 11/16/23 @ 14:26 by Nguyen Robertson NP)

H/O total hip arthroplasty
 ?Z96.649 - Presence of unspecified artificial hip joint (ICD-10)
H/O total hip arthroplasty
 ?Z96.649 - Presence of unspecified artificial hip joint (ICD-10)
History of cholecystectomy
 ?Z90.49 - Acquired absence of other specified parts of digestive tract (ICD-10)
History of excision of lesion
 ?Z98.890 - Other specified postprocedural states (ICD-10)
 ?Z87.2 - Personal history of diseases of the skin and subcutaneous tissue (ICD-10)
History of tonsillectomy
 ?Z90.89 - Acquired absence of other organs (ICD-10)
Hx of adenoidectomy
 ?Z90.89 - Acquired absence of other organs (ICD-10)
S/P rotator cuff repair
 ?Z98.890 - Other specified postprocedural states (ICD-10)


Social History (Reviewed 11/16/23 @ 14:26 by Nguyen Robertson NP)
Smoking status:  Never smoker 



Meds
Home Medications and Allergies
Home Medications

 Medication  Instructions  Recorded  Confirmed  Type
acetaminophen 325 mg tablet 650 mg PO Q4H PRN pain 06/01/23 11/06/23 History
(Tylenol)    
ascorbic acid (vitamin C) 1,000 mg 1 g PO QDAY 06/01/23 11/06/23 History
capsule    
aspirin 81 mg tablet,delayed 81 mg PO QDAY 06/01/23 11/06/23 History
release    
biotin 5 mg capsule 5 mg PO QDAY 06/01/23 11/06/23 History
cholecalciferol (vitamin D3) 50 50 mcg PO BID 06/01/23 11/06/23 History
mcg (2,000 unit) capsule    
cinnamon bark 500 mg capsule 1,000 mg PO QDAY 06/01/23 11/06/23 History
(Cinnamon)    
cranberry 500 mg capsule 500 mg PO BID 06/01/23 11/06/23 History
garlic 500 mg capsule 500 mg PO QDAY 06/01/23 11/06/23 History
hydrochlorothiazide 12.5 mg capsule 12.5 mg PO QDAY 06/01/23 11/06/23 History
losartan 25 mg tablet 25 mg PO QDAY 06/01/23 11/06/23 History
metformin 500 mg tablet 500 mg PO BID 06/01/23 11/06/23 History
metoprolol succinate 100 mg 100 mg PO BID 06/01/23 11/06/23 History
tablet,extended release 24 hr    
omega 3-dha-epa-fish oil 1,200 mg 1,250 cap PO .daily 06/01/23 11/06/23 History
(144 mg-216 mg) capsule (Fish Oil)    
pantoprazole 40 mg tablet,delayed 40 mg PO QAM 06/01/23 11/06/23 History
release (Protonix)    
liothyronine 5 mcg tablet 5 mcg PO 06/06/23  History
baclofen 10 mg tablet 10 mg PO BID 06/27/23 11/06/23 History
magnesium oxide 400 mg PO DAILY 08/15/23 11/06/23 History
red beet root 250 mg-sour cherry tab PO 08/15/23  History
extract 0.5 mg chewable tablet    
zinc 50 mg tablet 50 mg PO DAILY 08/15/23 11/06/23 History
acetaminophen 300 mg-codeine 30 mg 1 tab PO BID PRN pain #45 tabs 10/25/23 11/06/23 Rx
tablet    
duloxetine 60 mg capsule,delayed 60 mg PO DAILY #30 caps 10/25/23 11/06/23 Rx
release    
naloxone 4 mg/actuation nasal 4 mg intranasal Q3M PRN opioid 10/25/23 11/06/23 Rx
spray (Narcan) overdose #2 ea   
duloxetine 60 mg capsule,delayed 60 mg PO DAILY 10/26/23 11/06/23 History
release (Cymbalta)    


Allergies

Allergy/AdvReac Type Severity Reaction Status Date / Time
hydrocodone [From Vicodin] Allergy Mild ITCHY Verified 11/06/23 08:19
nickel Allergy Mild RASH, ITCHY Verified 11/06/23 08:19
oxycodone [From Percocet] Allergy Mild ITCHY Verified 11/06/23 08:19
latex AdvReac Mild Blister Verified 11/06/23 08:19
PCN Allergy Mild Rash Uncoded 11/06/23 08:19



Exam
Constitutional
Documenting provider has reviewed patient's vital signs: yes
Common normals: no apparent distress, oriented x3, healthy appearing, alert and well nourished
General appearance: cooperative
[facility]
Common normals: normocephalic, hearing grossly normal bilaterally and moist oral mucous membranes
Head and scalp: normocephalic
Eye
Common normals: PERRL
Pupil: PERRL
Neck & C-Spine
Common normals: full ROM
General: normal visual inspection
Chest
Common normals: inspection of chest normal
Respiratory
Common normals: normal respiratory effort, no retractions and no use of accessory muscles
Back & Pelvis
Lumbar spine/lower back: pain with ROM and straight leg raise negative bilaterally
Extremity
Common normals: normal to inspection and full ROM
Neuro
Common normals: oriented x3, CN's II-XII intact bilaterally, moves all extremities, no focal motor deficits, no sensory deficits noted and deep tendon reflexes 2+ bilaterally
Sensorium/orientation: alert
Gait (neuro): antalgic
Motor exam: strength 5/5 throughout and no movement abnormalities noted
Psych
Common normals: mental status grossly normal, thought process normal, cooperative, affect normal, speech normal and activity/motor behavior normal
Speech: normal speech
Thought process: normal thought process

Assessment and Plan
Assessment and Plan
(1) Lumbar degenerative disc disease: 
(2) Muscle spasm: 
(3) Lumbar spondylosis: 
(4) Chronic prescription opiate use: 
      Assessment and Plan: 
I feel these medications are improving the patient's quality of life and allow them to tolerate activities of daily living as well as participate in recreational activity.? The patient does not report intolerable side effects. The patient is NOT 
opioid naive and non-pharmacologic and non-opioid treatment has failed to significantly relieve the patient's pain and improve functionality. The patient has a diagnosis that is related to a somatic or visceral pain etiology. ?
??
I reviewed with the patient the potential risks and side effects with the use of?
opioid medications including but not limited to respiratory depression,?
sedation, and even death. I verified the patient has access to naloxone should?
these effects occur. I advised the patient to avoid the use of any other?
sedation substances including alcohol, THC, and benzodiazepines while?
taking opioid medications due to the risk of compounding side effects and?
detrimental outcomes. I reviewed the , pain treatment agreement, urine?
drug screen, and opioid start talking forms. The patient was advised to let?
their family know they had Naloxone in case they would need to administer?
the medication.?
??
A drug screen was completed within the last year, and no aberrancies were noted regarding their use of controlled substances. The patient understands they are subject to the terms and conditions of the pain contract that they have signed. ?
??
I have checked an [place] report on this patient today and there are no aberrancies noted in the prescribing history.?

Plan
continue current medication regimen
f/u 3 months to evaluate need for repeat TFESI

## 2024-01-04 ENCOUNTER — HOSPITAL ENCOUNTER
Dept: HOSPITAL 101 - LAB | Age: 69
Discharge: HOME | End: 2024-01-04
Payer: MEDICARE

## 2024-01-04 DIAGNOSIS — M51.36: ICD-10-CM

## 2024-01-04 DIAGNOSIS — E66.3: Primary | ICD-10-CM

## 2024-01-04 LAB
ADD MANUAL DIFF: NO
ALANINE AMINOTRANSFERASE: 67 U/L (ref 14–59)
ALBUMIN GLOBULIN RATIO: 0.9
ALBUMIN LEVEL: 3.4 G/DL (ref 3.4–5)
ALKALINE PHOSPHATASE: 136 U/L (ref 46–116)
ANION GAP: 9.8
ASPARTATE AMINO TRANSFERASE: 18 U/L (ref 15–37)
BLOOD UREA NITROGEN: 18 MG/DL (ref 7–18)
CALCIUM: 9.8 MG/DL (ref 8.5–10.1)
CARBON DIOXIDE: 32.7 MMOL/L (ref 21–32)
CHLORIDE: 99 MMOL/L (ref 98–107)
CO2 BLD-SCNC: 32.7 MMOL/L (ref 21–32)
ESTIMATED GFR (AFRICAN AMERICA: >60 (ref 60–?)
ESTIMATED GFR (NON-AFRICAN AME: 53 (ref 60–?)
FERRITIN: 75 NG/ML (ref 8–252)
GLOBULIN: 3.7 G/DL
GLUCOSE BLD-MCNC: 104 MG/DL (ref 74–106)
HCT VFR BLD CALC: 39.4 % (ref 36–48)
HEMATOCRIT: 39.4 % (ref 36–48)
HEMOGLOBIN: 12.9 G/DL (ref 12–16)
IMMATURE GRANULOCYTES ABS AUTO: 0.06 10^3/UL (ref 0–0.03)
IMMATURE GRANULOCYTES PCT AUTO: 0.7 % (ref 0–0.5)
LYMPHOCYTES  ABSOLUTE AUTO: 1.9 10^3/UL (ref 1.2–3.8)
MCV RBC: 92.1 FL (ref 81–99)
MEAN CORPUSCULAR HEMOGLOBIN: 30.1 PG (ref 26.7–34)
MEAN CORPUSCULAR HGB CONC: 32.7 G/DL (ref 29.9–35.2)
MEAN CORPUSCULAR VOLUME: 92.1 FL (ref 81–99)
PLATELET # BLD: 406 10^3/UL (ref 150–450)
PLATELET COUNT: 406 10^3/UL (ref 150–450)
POTASSIUM SERPLBLD-SCNC: 3.5 MMOL/L (ref 3.5–5.1)
POTASSIUM: 3.5 MMOL/L (ref 3.5–5.1)
RED BLOOD COUNT: 4.28 10^6/UL (ref 4.2–5.4)
SODIUM BLD-SCNC: 138 MMOL/L (ref 136–145)
SODIUM: 138 MMOL/L (ref 136–145)
T4 FREE: 0.92
THYROID STIMULATING HORMONE: 1.44 UIU/ML (ref 0.36–3.74)
TOTAL PROTEIN: 7.1 G/DL (ref 6.4–8.2)
TSH SERPL DL<=0.05 MIU/L-ACNC: 1.44 UIU/ML
WBC # BLD: 9.1 10^3/UL (ref 4–11)
WHITE BLOOD COUNT: 9.1 10^3/UL (ref 4–11)

## 2024-01-04 PROCEDURE — 36415 COLL VENOUS BLD VENIPUNCTURE: CPT

## 2024-01-04 PROCEDURE — 84443 ASSAY THYROID STIM HORMONE: CPT

## 2024-01-04 PROCEDURE — 82728 ASSAY OF FERRITIN: CPT

## 2024-01-04 PROCEDURE — 85025 COMPLETE CBC W/AUTO DIFF WBC: CPT

## 2024-01-04 PROCEDURE — 72100 X-RAY EXAM L-S SPINE 2/3 VWS: CPT

## 2024-01-04 PROCEDURE — 80053 COMPREHEN METABOLIC PANEL: CPT

## 2024-01-04 PROCEDURE — 84439 ASSAY OF FREE THYROXINE: CPT

## 2024-01-04 NOTE — XR_ITS
The [address] [address] 
     [phone] 
  
  
Patient Name: 
RACQUEL LOZADA 
  
MRN: [id number]    YOB: 1955    Sex: F 
Assigned Patient Location: LAB 
Current Patient Location:   
Accession/Order Number: [id number] 
Exam Date: 1/04/2024  14:08    Report Date: 1/05/2024  07:23 
  
At the request of: 
MAKI HATHAWAY   
  
Procedure:  XR lumbar spine 2-3V 
  
EXAMINATION: XR lumbar spine 2-3V  
  
HISTORY: overweight E66.3 
  
COMPARISON: No relevant comparison available.  
  
FINDINGS:  
BONES: Mild to moderate degenerative spondylosis and facet osteoarthropathy. 9  
  
mm anterolisthesis of L4 in relation L5. Bilateral hip arthroplasty  
DISC SPACES: Mild to moderate disc space narrowing with endplate sclerosis and  
  
vacuum disc most significant L4-S1  
PARASPINOUS: Negative. No paraspinous abnormality is seen.  
OTHER: Negative.  
  
ORDER #: [id number] XR/XR lumbar spine 2-3V  
IMPRESSION:   
   
Mild-to-moderate degenerative changes  
   
9 mm anterolisthesis of L4 and L5  
   
   
Electronically authenticated by: MARV HERNANDEZ   Date: 1/05/2024  07:23

## 2024-01-17 ENCOUNTER — HOSPITAL ENCOUNTER
Dept: HOSPITAL 101 - MRI | Age: 69
Discharge: HOME | End: 2024-01-17
Payer: MEDICARE

## 2024-01-17 DIAGNOSIS — M51.36: ICD-10-CM

## 2024-01-17 DIAGNOSIS — M54.50: Primary | ICD-10-CM

## 2024-01-17 PROCEDURE — 72148 MRI LUMBAR SPINE W/O DYE: CPT

## 2024-01-17 NOTE — MR_ITS
[address] [address] 
     [phone] 
  
  
Patient Name: 
RACQUEL LOZADA 
  
MRN: [id number]    YOB: 1955    Sex: F 
Assigned Patient Location: MRI 
Current Patient Location: MRI 
Accession/Order Number: [id number] 
Exam Date: 1/17/2024  12:25    Report Date: 1/17/2024  13:37 
  
At the request of: 
MAKI HATHAWAY   
  
Procedure:  MR lumbar spine wo con 
  
EXAMINATION: MR lumbar spine wo con  
  
HISTORY: Low Back Pain M54.50 , left leg radiculopathy 
  
COMPARISON: 1/4/2024 
  
TECHNIQUE: A variety of imaging planes and parameters were utilized for  
visualization of suspected pathology. 
  
FINDINGS: 
For the purposes of numbering, sagittal T2 image # 8 extends from the T10-T11  
vertebral body superiorly to the S4-S5 level inferiorly. 
  
PARASPINAL AREA: Normal with no visible mass.  
BONES: 7 mm anterolisthesis of L4 in relation L5. Mild degenerative  
spondylosis. Moderate facet osteoarthropathy. Heterogeneous appearance of the  
marrow likely age-related change. Decreased T1 increased T2 and STIR signal  
centered at L5-S1 likely Modic 2 changes  
CORD/CAUDA EQUINA: Normal caliber, contour, and signal intensity. 
  
DISC LEVELS: 
12-L1: Moderate degenerative disc disease is present without visible neural  
impingement.  
L1-L2: Moderate degenerative disc disease is present without visible neural  
impingement.  
L2-L3: Moderate degenerative disc disease is present without visible neural  
impingement.  
L3-L4: Disc desiccation. Mild posterior disc/osteophyte complex and facet  
osteoarthropathy. No central canal stenosis or left foraminal stenosis. Mild  
narrowing of the right neural foramen best seen on sagittal image 11  
L4-L5: 7 mm anterolisthesis of L4 in relation L5. Moderate disc space  
narrowing  
and disc desiccation. Moderate diffuse bulge/pseudobulge and facet  
osteoarthropathy. No central canal or left foraminal stenosis. Moderate  
narrowing of the right neural foramen, sagittal image 11  
L5-S1: Severe disc space narrowing with mild diffuse disc/osteophyte complex.  
Bilateral facet osteoarthropathy. No central canal stenosis. Mild bilateral  
foraminal stenosis  
  
ORDER #: [id number] MR/MR lumbar spine wo con  
IMPRESSION:   
   
Degenerative changes and 7 mm anterolisthesis of L4 and L5  
   
Right foraminal stenosis L3-4, L4-5 and L5-S1   
   
Left foraminal stenosis at L5-S1  
   
   
Electronically authenticated by: MARV HERNANDEZ   Date: 1/17/2024  13:37

## 2024-02-14 ENCOUNTER — HOSPITAL ENCOUNTER (OUTPATIENT)
Dept: HOSPITAL 101 - LAB | Age: 69
LOS: 5 days | Discharge: HOME | End: 2024-02-19
Payer: MEDICARE

## 2024-02-14 DIAGNOSIS — L72.0: Primary | ICD-10-CM

## 2024-02-14 PROCEDURE — 88304 TISSUE EXAM BY PATHOLOGIST: CPT

## 2024-02-19 NOTE — XMS_ITS
Comprehensive CCD (C-CDA v2.1)  
  
                          Created on: 2024  
  
  
Sherrie Lozada  
External Reference #: [id number]  
: 1955  
Sex: Female  
  
Demographics  
  
  
                                        Address             [address] [place]  [address]  
   
                                        Home Phone          [phone]  
   
                                        Preferred Language  en  
   
                                        Marital Status        
   
                                        Mandaen Affiliation Unknown  
   
                                        Race                White  
   
                                        Ethnic Group        Not  or Lati  
no  
  
  
Author  
  
  
                                        Name                Unknown  
   
                                        Address             [address]  
[address]  
[place]  [address]  
   
                                        Phone               [phone]  
   
                                        Organization        [facility]  
  
  
Care Team Providers  
  
  
                                Care Team Member Name Role            Phone  
   
                                Maki Veliz MD Primary Care Provider [phone]  
3-1991  
   
                                MAKI VELIZ  Referring       Unavailable  
   
                                MAKI VELIZ  Primary Care    Unavailable  
   
                                Maki Veliz MD Primary Care Provider [phone]  
3-1991  
   
                                Maki Veliz MD Primary Care Provider [phone]  
3-1991  
   
                                MAKI VELIZ  Primary Care    Unavailable  
   
                                REGGIE OCAMPO  Referring       Unavailable  
   
                                OCAMPO, REGGIE  Referring       Unavailable  
   
                                HOMAKI PHILIP M  Primary Care    Unavailable  
   
                                HOYMAKI M  Primary Care    Unavailable  
   
                                HOYSUSSYMAKI M  Referring       Unavailable  
   
                                MAKI VELIZ M  Primary Care    Unavailable  
   
                                SAMEER HAGAN. Referring       Unavailable  
   
                                MAKI VELIZ M  Primary Care    Unavailable  
   
                                REGGIE OCAMPO  Referring       Unavailable  
   
                                HAGAN ., DR SAMEER MERLOS Admitting       Unavailable  
   
                                HAGAN ., DR SAMEER MERLOS Attending       Unavailable  
   
                                HOY ., DR GAMBINO Primary Care    Unavailable  
   
                                HAGAN ., DR SAMEER MERLOS Admitting       Unavailable  
   
                                HAGAN ., DR SAMEER MERLOS Attending       Unavailable  
   
                                MAG ., DR GAMBINO Primary Care    Unavailable  
   
                                HAGAN ., DR SAMEER MERLOS Consulting      Unavailable  
   
                                HAGAN ., DR SAMEER MERLOS Admitting       Unavailable  
   
                                HAGAN ., DR SAMEER MERLOS Attending       Unavailable  
   
                                MAG ., DR GAMBINO Primary Care    Unavailable  
   
                                HAGAN ., DR SAMEER MERLOS Consulting      Unavailable  
   
                                CHERIE ROBERT     Consulting      Unavailable  
   
                                GENNARO MOSS Consulting      Unavailable  
   
                                HAGAN ., DR SAMEER MERLOS Admitting       Unavailable  
   
                                HAGAN ., DR SAMEER MERLOS Attending       Unavailable  
   
                                HORAFAEL ., DR GAMBINO Primary Care    Unavailable  
   
                                OCAMPO ., MR REGGIE Admitting       Unavailable  
   
                                OCAMPO ., MR REGGIE Attending       Unavailable  
   
                                MAG ., DR GAMBINO Primary Care    Unavailable  
   
                                WEST, DR MARV ALLEN Consulting      Unavailable  
   
                                OCAMPO ., MR REGGIE Consulting      Unavailable  
   
                                HAGAN ., DR SAMEER MERLOS Admitting       Unavailable  
   
                                HAGAN ., DR SAMEER MERLOS Attending       Unavailable  
   
                                HOY ., DR GAMBINO Primary Care    Unavailable  
   
                                HAGAN ., DR SAMEER MERLOS Consulting      Unavailable  
   
                                JAKOB CHERIE     Consulting      Unavailable  
   
                                HAGAN ., DR SAMEER MERLOS Admitting       Unavailable  
   
                                HAGAN ., DR SAMEER MERLOS Attending       Unavailable  
   
                                HOY ., DR GAMBINO Primary Care    Unavailable  
   
                                MURRAY ., JAI   Consulting      Unavailable  
   
                                LAKSHMIPATHY ., NARENDRANATH Admitting       Ann Marie  
vailable  
   
                                LAKSHMIPATHY ., NARENDRANATH Attending       Ann Marie  
vailable  
   
                                HOY ., DR GAMBINO Primary Care    Unavailable  
   
                                LAKSHMIPATHY ., NARENDRANATH Consulting      Ann Marie  
vailable  
   
                                HAGAN ., DR SAMEER MERLOS Admitting       Unavailable  
   
                                HAGAN ., DR SAMEER MERLOS Attending       Unavailable  
   
                                HOY ., DR GAMBINO Primary Care    Unavailable  
   
                                HAGAN ., DR SAMEER MERLOS Consulting      Unavailable  
   
                                MURRAY ., JAI   Consulting      Unavailable  
   
                                LAKSHMIPATHY ., NARENDRANATH Admitting       Ann Marie  
vailable  
   
                                LAKSHMIPATHY ., NARENDKEDARATH Attending       Ann Marie  
vailable  
   
                                HOY ., DR GAMBINO Primary Care    Unavailable  
   
                                SHARPELAYNE Consulting      Unavailable  
   
                                LAKSHMIPATHY ., NARENDRANATH Consulting      Ann Marie  
vailable  
   
                                LAKSHMIPATHY ., NARENDRANATH Admitting       Ann Marie  
vailable  
   
                                LAKSHMIPATHY ., NARENDKEDARATH Attending       Ann Marie  
vailable  
   
                                HOY ., DR GAMBINO Primary Care    Unavailable  
   
                                TANISHA MEDELLIN Consulting      Unavailable  
   
                                LAKSHMIPATHY ., NARENDKEDARATH Consulting      Ann Marie  
vailable  
   
                                HOY ., DR GAMBINO Primary Care    Unavailable  
   
                                LAKSHMIPATHY ., NARENDRANATH Admitting       Ann Marie  
vailable  
   
                                LAKSHMIPATHY ., NARENDRANATH Attending       Ann Marie  
vailable  
   
                                LAKSHMIPATHY ., NARENDRANATH Consulting      Ann Marie  
vailable  
   
                                HAGAN ., DR SAMEER MERLOS Admitting       Unavailable  
   
                                HAGAN ., DR SAMEER MERLOS Attending       Unavailable  
   
                                HOY ., DR GAMBINO Primary Care    Unavailable  
   
                                MURRAY ., JAI   Consulting      Unavailable  
   
                                LAKSHMIPATHY ., NARENDRANATH Admitting       Ann Mraie  
vailable  
   
                                LAKSHMIPATHY ., NARENDRANATH Attending       Ann Marie  
vailable  
   
                                HOY ., DR GAMBINO Primary Care    Unavailable  
   
                                LAKSHMIPATHY ., NARENDRANATH Admitting       Ann Marie  
vailable  
   
                                LAKSHMIPATHY ., NARENDRANATH Attending       Ann Marie  
vailable  
   
                                HOY ., DR GAMBINO Primary Care    Unavailable  
   
                                LAKSHMIPATHY ., NARENDRANATH Consulting      Ann Marie  
vailable  
   
                                HOY ., DR MAKI Admitting       Unavailable  
   
                                HOY ., DR GAMBINO Attending       Unavailable  
   
                                HOY ., DR GAMBINO Primary Care    Unavailable  
   
                                HOY ., DR GAMBINO Consulting      Unavailable  
   
                                HAGAN ., DR SAMEER MERLOS Admitting       Unavailable  
   
                                HAGAN ., DR SAMEER MERLOS Attending       Unavailable  
   
                                HOY ., DR GAMBINO Primary Care    Unavailable  
   
                                HAGAN ., DR SAMEER MERLOS Consulting      Unavailable  
   
                                ZAC MARS Consulting      Unavailable  
   
                                HOY ., DR GAMBINO Admitting       Unavailable  
   
                                HOY ., DR GAMBINO Attending       Unavailable  
   
                                HOY ., DR GAMBINO Primary Care    Unavailable  
   
                                HOY ., DR GAMBINO Consulting      Unavailable  
   
                                HAGAN ., DR SAMEER MERLOS Admitting       Unavailable  
   
                                HAGAN ., DR SAMEER MERLOS Attending       Unavailable  
   
                                HOY ., DR GAMBINO Primary Care    Unavailable  
   
                                MURRAY ., JAI   Consulting      Unavailable  
   
                                HOY ., DR GAMBINO Primary Care    Unavailable  
   
                                MEAGAN ., MAGALY Admitting       Unavailable  
   
                                MEAGAN ., MAGALY Attending       Unavailable  
   
                                LANE, DR ZAC WELLS Consulting      Unavailable  
   
                                MEAGAN ., MAGALY Consulting      Unavailable  
   
                                ALICIA MICHEL Consulting      Unavailable  
   
                                HOY ., DR GAMBINO Admitting       Unavailable  
   
                                HOY ., DR GAMBINO Attending       Unavailable  
   
                                HOY ., DR GAMBINO Primary Care    Unavailable  
   
                                HOY ., DR GAMBINO Consulting      Unavailable  
   
                                SANG COULTER    Consulting      Unavailable  
   
                                HAGAN ., DR SAMEER MERLOS Admitting       Unavailable  
   
                                HAGAN ., DR SAMEER MERLOS Attending       Unavailable  
   
                                HOY ., DR GAMBINO Primary Care    Unavailable  
   
                                MURRAY ., JAI   Consulting      Unavailable  
   
                                HOY ., DR GAMBINO Admitting       Unavailable  
   
                                HOY ., DR GAMBINO Attending       Unavailable  
   
                                HOY ., DR GAMBINO Primary Care    Unavailable  
   
                                HOY ., DR GAMBINO Consulting      Unavailable  
   
                                Jose Abraham Admitting       Unavail  
able  
   
                                MAKI VELIZ    Primary Care    Unavailable  
   
                                Jose Abraham Attending       Unavail  
able  
   
                                MAKI VELIZ    Primary Care    Unavailable  
   
                                Jose Abraham Attending       Unavail  
able  
   
                                Jose Abraham Admitting       Unavail  
able  
   
                                Carmita FAJARDO, Delmis Dowd Attending         
Unavailable  
   
                                JOSE ABRAHAM Attending       Unavailable  
   
                                Maki Veliz    Primary Care Physician [phone]  
[phone]  
   
                                Krys Pritchett Primary Care Physician Krys Butts Unavailable     Unavailable  
   
                                MD Maki Veliz Primary Care Provider [phone]  
3  
   
                                MD Oh Salmeron Attending Provider [phone]  
   
                                Oh SALMERON Attending       Unavailable  
   
                                Oh SALMERON Attending       Unavailable  
   
                                Maki Veliz    Referring       Unavailable  
   
                                Oh SALMERON Attending       Unavailable  
   
                                Maki Veliz    Referring       Unavailable  
   
                                Maki Veliz  Primary Care    Unavailable  
   
                                Oh Salmeron Attending       Unavailable  
   
                                Oh Salmeron Admitting       Unavailable  
  
  
  
Allergies  
  
  
                                                    Allergy   
Classification                          Reported   
Allergen(s)               Allergy Type              Date of   
Onset                     Reaction(s)               Facility  
   
                                                      
(8 sources)                             Acetaminophen /   
HYDROcodone;   
Translations:   
[acetaminophen-hy  
drocodone]                Drug Allergy                
1                                       Itching,   
Itching   
(finding)                               [facility]  
Work Phone:   
[phone]  
1  
   
                                                      
(8 sources)                             Acetaminophen /   
oxyCODONE;   
Translations:   
[acetaminophen-ox  
ycodone]                  Drug Allergy                
1                                       Itching,   
Itching   
(finding)                               [facility]  
Work Phone:   
[phone]  
1  
   
                                                      
(13 sources)                            Latex;   
Translations:   
[Latex]                                 Propensity to   
adverse   
reactions to   
drug                                      
1                                       Other (See   
Comments),   
Eruption of   
skin   
(disorder)                              [facility]  
   
                                                      
(13 sources)                            nickel sulfate;   
Translations:   
[Nickel]                  Drug Allergy                
4                                       Eruption of   
skin   
(disorder)                              [facility]  
   
                                                      
(12 sources)                            Penicillins;   
Translations:   
[Penicillins]                           Propensity to   
adverse   
reactions to   
drug                                      
1                         Rash                      [facility]  
Work Phone:   
[phone]  
1  
   
                                                      
(4 sources)                             Acetaminophen /   
HYDROcodone;   
Translations:   
[Vicodin]                 Drug Allergy                
3                                                   [facility]   
Repository  
   
                                                      
(4 sources)                             Acetaminophen /   
oxyCODONE;   
Translations:   
[Percocet]                Drug Allergy                
3                                                   [facility]   
Repository  
   
                                                      
(2 sources)               Latex                     Drug allergy   
(disorder)                                
3                                                   [facility]   
Repository  
   
                                                      
(1 source)          Leucine             Drug Allergy        10-  
3                                                   [facility]   
Repository  
   
                                                      
(4 sources)                             Penicillin;   
Translations:   
[penicillin]        Drug Allergy                            Eruption of   
skin   
(disorder)                              [facility]   
Repository  
   
                                                      
(3 sources)                             HYDROcodone;   
Translations:   
[HYDROcodone]             Drug Allergy                
3                                                   [facility]   
Repository  
   
                                                      
(2 sources)                             Acetaminophen;   
Translations:   
[acetaminophen]           Drug Allergy                
3                                                   [facility]  
   
                                                      
(1 source)                              homatropine /   
HYDROcodone;   
Translations:   
[Hydrocodone   
Compound]       Drug Allergy                                    [facility]   
[place]   
[facility]  
Work Phone:   
[phone]  
   
                                                      
(2 sources)                             oxyCODONE;   
Translations:   
[oxycodone]               Drug Allergy                
3                                                   [facility]  
   
                                                      
(1 source)          Acetaminophen       Drug Allergy          
3                                                   [facility]   
Repository  
   
                                                      
(1 source)                Latex                     Drug allergy   
(disorder)                                
3                                                   [facility]   
Repository  
   
                                                      
(1 source)          nickel              Drug Allergy          
3                                                   [facility]   
Repository  
   
                                                      
(1 source)          oxyCODONE           Drug Allergy          
3                                                   [facility]   
Repository  
  
  
  
Medications  
Current Medications  
  
  
  
                      Medication Drug Class(es) Dates      Sig (Normalized) Sig   
(Original)  
   
                                                    acetaminophen 300   
mg / codeine   
phosphate 30 mg   
oral tablet  
(6 sources)               Opioid Agonist            Start:   
2014                              take 1-2 tablets   
by mouth every   
four hours as   
needed                                  acetaminophen-cod  
eine   
(TYLENOL/CODEINE   
#3) 300-30 MG per   
tablet   
Indications: Hip   
pain, bilateral   
Take 1-2 tablets   
by mouth every 4   
hours as needed.   
50 tablet 3   
2014 Active  
   
                                                    Acidophilus   
Probiotic Blend  
(2 sources)                                         Start:   
2024                                          Acidophilus   
Probiotic Blend   
Refill(s) 0 Start   
Date: 24   
Status: Ordered  
   
                                                    ALPRAZolam 0.5 mg   
oral tablet  
(6 sources)               Benzodiazepine            Start:   
2015                              take 1 tablet by   
mouth three times   
daily as needed   
for anxiety                             ALPRAZolam   
(XANAX) 0.5 MG   
tablet   
Indications:   
Generalized   
anxiety disorder   
Take 1 tablet by   
mouth 3 times   
daily as needed   
for Sleep or   
Anxiety 90 tablet   
2 2015   
Active  
   
                                                    aspirin 81 mg   
delayed release   
oral tablet  
(3 sources)                             Platelet Aggregation   
Inhibitor,   
Nonsteroidal   
Anti-inflammatory   
Drug                                    Start:   
2024                              take 1 tablet by   
mouth once daily                        aspirin 81 mg   
Oral EC Tab 81 mg   
= 1 tab(s), Oral,   
Daily, Refills(s)   
0 Start Date:   
24 Status:   
Ordered  
  
  
  
                                                take 1 tablet by mouth once delmi  
y aspirin 81 mg chewable tablet chew 1 tablet   
(81   
mg) by oral route once daily  
  
  
  
                                                    azithromycin 250 mg   
oral tablet  
(6 sources)                             Macrolide   
Antimicrobial                           Start:   
2018                                          azithromycin   
(ZITHROMAX) 250 MG   
tablet Take 1   
tablet by mouth   
daily Take 2   
tablets day 1 Take   
1 tablet days 2-5   
6 tablet 0   
2018 Active  
   
                                                    benazepril   
hydrochloride 10 mg   
oral tablet  
(6 sources)                             Angiotensin   
Converting Enzyme   
Inhibitor                                           take 2   
tablets by   
mouth once   
daily                                   benazepril   
(LOTENSIN) 10 MG   
tablet Take 20 mg   
by mouth daily. 0   
Active  
   
                                                    biotin 5 mg oral   
tablet  
(6 sources)                                                     Biotin 5000 MCG   
TABS Indications:   
Routine   
gynecological   
examination Take   
by mouth. 0 Active  
   
                                                    cinnamon bark 500   
mg oral capsule  
(6 sources)                                                 take 4   
capsules by   
mouth once   
daily                                   Cinnamon (GNP   
CINNAMON) 500 MG   
CAPS Take 2,000 mg   
by mouth daily. 0   
Active  
   
                                                    COCONUT OIL PO  
(6 sources)                                                     COCONUT OIL PO   
Indications:   
Routine   
gynecological   
examination Take   
by mouth. 0 Active  
   
                                                    Cranberry   
preparation  
(2 sources)                             Non-Standardized   
Food Allergenic   
Extract,   
Non-Standardized   
Plant Allergenic   
Extract                                 Start:   
2024                                          Cranberry   
Refill(s) 0 Start   
Date: 24   
Status: Ordered  
   
                                                    duloxetine 30 mg   
Cap-DR  
(2 sources)                                         Start:   
2024                              take 1   
capsule by   
mouth twice   
daily                                   duloxetine 30 mg   
Cap-DR = 1 cap(s),   
Oral, BID,   
Refills(s) 0 Start   
Date: 24   
Status: Ordered  
   
                                                    Fish Oils  
(8 sources)                                         Start:   
2024                              take 1   
capsule by   
mouth twice   
daily                                   Fish Oil 1200 mg   
oral capsule 1,200   
mg = 1 cap(s),   
Oral, BID,   
Refills(s) 0 Start   
Date: 24   
Status: Ordered  
  
  
  
                                                                FISH OIL 2,400 m  
g by Does not apply route daily. 0 Active  
  
  
  
                                                    hydroCHLOROthiazide 25   
mg oral tablet  
(2 sources)                             Thiazide   
Diuretic                                Start:   
2024                              take 1   
tablet by   
mouth once   
daily                                   hydrochlorothiazide 25   
mg Tab 25 mg = 1   
tab(s), Oral, Daily,   
Refills(s) 0 Start   
Date: 24 Status:   
Ordered  
   
                                                    liothyronine sodium   
0.005 mg oral tablet  
(2 sources)                             l-Triiodothyron  
ine                                     Start:   
2024                              take 1   
tablet by   
mouth once   
daily                                   Cytomel 5 mcg Tab 5 mcg   
= 1 tab(s), Oral,   
Daily, Refills(s) 0   
Start Date: 24   
Status: Ordered  
   
                                                    losartan potassium 25   
mg oral tablet  
(3 sources)                             Angiotensin 2   
Receptor   
Blocker                                 Start:   
2024                              take 1   
tablet by   
mouth once   
daily                                   losartan 25 mg Tab 25   
mg = 1 tab(s), Oral,   
Daily, Refills(s) 0   
Start Date: 24   
Status: Ordered  
  
  
  
                                                take 1 tablet by mouth once delmi  
y losartan 50 mg tablet take 1 tablet (50 mg)   
by   
oral route once daily  
  
  
  
                                                    metFORMIN   
hydrochloride 500 mg   
oral tablet  
(3 sources)         Biguanide           Start: 2024   take 1 tablet   
by mouth   
twice daily                             metformin 500 mg   
Tab 500 mg = 1   
tab(s), Oral,   
BID, Refills(s) 0   
Start Date:   
24 Status:   
Ordered  
   
                                                    metoprolol tartrate   
100 mg oral tablet  
(9 sources)                             beta-Adrenergic   
Blocker                   Start: 2024         take 1 tablet   
by mouth   
twice daily                             metoprolol   
tartrate 100 mg   
Tab 100 mg = 1   
tab(s), Oral,   
BID, Refills(s) 0   
Start Date:   
24 Status:   
Ordered  
  
  
  
                                                take 1 tablet by mouth once delmi  
y metoprolol succinate  mg   
tablet,extended   
release 24 hr take 1 tablet (100 mg) by oral   
routetwice daily  
   
                                                take 2 tablets by mouth once sony  
ly metoprolol (TOPROL-XL) 50 MG XL tablet Take   
100   
mg by mouth daily. 0 Active  
  
  
  
                                                    Multiple   
Vitamins-Minerals (HM   
MULTIVITAMIN ADULT GUMMY)   
CHEW  
(6 sources)                                                     Multiple   
Vitamins-Minerals (HM   
MULTIVITAMIN ADULT   
GUMMY) CHEW Indications:   
Routine gynecological   
examination Take by   
mouth. 0 Active  
   
                                                    pantoprazole 40 mg   
delayed release oral   
tablet  
(9 sources)                             Proton Pump   
Inhibitor                               Start:   
                                     take 1 tablet   
by mouth once   
daily                                   Pantoprazole 40 mg DR   
Tab 40 mg = 1 tab(s),   
Oral, Daily, Refills(s)   
0 Start Date: 24   
Status: Ordered  
  
  
  
                                                take 40 mg by mouth once daily P  
antoprazole Sodium (PROTONIX) 40 MG PACK packet  
   
Take 40 mg by mouth daily. 0 Active  
  
  
  
                                                    simvastatin 20 mg   
oral tablet  
(3 sources)                             HMG-CoA Reductase   
Inhibitor                 Start: 2024         take 1 tablet   
by mouth once   
daily in the   
evening                                 simvastatin 20 mg   
Tab 20 mg = 1   
tab(s), Oral, qPM,   
Refills(s) 0 Start   
Date: 24   
Status: Ordered  
  
  
  
                                                            take 1 tablet by taryn  
th once daily in the   
evening                                 simvastatin 40 mg tablet take 1 tablet (  
40   
mg) by oral route once daily in the evening  
  
  
  
                                                    Vitamin D3 2000 intl   
units oral Tab  
(2 sources)                             Start: 2024   take 2 tablets by   
mouth once daily                        Vitamin D3 2000 intl   
units oral Tab = 2   
tab(s), Oral, Daily,   
tab(s), Refills(s) 0   
Start Date: 24   
Status: Ordered  
  
  
  
Completed/Discontinued Medications  
  
  
  
                      Medication Drug Class(es) Dates      Sig (Normalized) Sig   
(Original)  
   
                                                    baclofen 5 mg oral   
tablet  
(1 source)                              gamma-Aminobutyric   
Acid-ergic Agonist                                  take 1 tablet by   
mouth three times   
daily as needed                         baclofen 5 mg   
tablet take 1   
tablet (5 mg) by   
oral route 3 times   
per day prn  
  
  
  
Problems  
Active Problems  
  
  
                                                    Problem   
Classification  Problem         Date            Documented Date Episodic/Chronic  
   
                                                    Anxiety disorders  
(3 sources)                             Anxiety disorder,   
unspecified;   
Translations:   
[Anxiety]                               Onset:   
2023                Chronic  
   
                                                    Congestive heart   
failure;   
nonhypertensive  
(3 sources)                             Unspecified diastolic   
(congestive) heart   
failure; Translations:   
[Acute combined   
systolic and diastolic   
heart failure]                          Onset:   
2022                Chronic  
   
                                                    Diabetes mellitus   
without complication  
(3 sources)                             Type 2 diabetes   
mellitus without   
complications;   
Translations:   
[Diabetes mellitus]                     Onset:   
2022                Chronic  
   
                                                    Diseases of white   
blood cells  
(4 sources)                             Elevated white blood   
cell count,   
unspecified;   
Translations:   
[ELEVATED WHITE BLOOD   
CELL COUNT UNS]                         Onset:   
2022                                          Chronic  
   
                                                    Disorders of lipid   
metabolism  
(5 sources)                             Pure   
hypercholesterolemia,   
unspecified;   
Translations:   
[Hypercholesterolemia]                  Onset:   
2023                Chronic  
   
                                                    Esophageal disorders  
(3 sources)                             Gastro-esophageal   
reflux disease without   
esophagitis;   
Translations:   
[Gastroesophageal   
reflux disease]                         Onset:   
2023                Chronic  
   
                                                    Essential   
hypertension  
(3 sources)                             Essential (primary)   
hypertension;   
Translations:   
[Hypertensive   
disorder]                               Onset:   
2023                Chronic  
   
                                                    Fluid and electrolyte   
disorders  
(1 source)                              Dehydration;   
Translations:   
[DEHYDRATION]                           Onset:   
2023                                          Episodic  
   
                                                    Hypertension with   
complications and   
secondary   
hypertension  
(1 source)                              Hypertensive heart   
disease with heart   
failure; Translations:   
[HTN HEART DISEASE   
W/HEART FAIL]                           Onset:   
2022                                          Chronic  
   
                                                    Mood disorders  
(1 source)                              Mood disorders;   
Translations:   
[DEPRESSION   
UNSPECIFIED]                            Onset:   
2023                                            
   
                                                    Other aftercare  
(1 source)                              Long term (current)   
use of aspirin;   
Translations: [LONG   
TERM CURRENT USE OF   
ASPIRIN]                                Onset:   
2023                                          Episodic  
   
                                                    Other aftercare  
(1 source)                              Other long term   
(current) drug   
therapy; Translations:   
[OTH LONG TERM CURRENT   
DRUG THERAPY]                           Onset:   
2023                                          Episodic  
   
                                                    Other aftercare  
(1 source)                              Long term (current)   
use of oral   
hypoglycemic drugs;   
Translations: [LONG   
TERM USE ORAL   
HYPOGLYCEMIC DX]                        Onset:   
2023                                          Episodic  
   
                                                    Other connective   
tissue disease  
(1 source)                              Presence of right   
artificial hip joint;   
Translations:   
[PRESENCE RIGHT   
ARTIFICIAL HIP JOINT]                   Onset:   
2023                                          Chronic  
   
                                                    Other connective   
tissue disease  
(4 sources)                             Neuralgia and   
neuritis, unspecified;   
Translations:   
[NEURALGIA AND   
NEURITIS UNSPECIFIED]                   Onset:   
2023                                          Episodic  
   
                                                    Other connective   
tissue disease  
(4 sources)                             Bicipital tendinitis,   
left shoulder;   
Translations:   
[BICIPITAL TENDINITIS   
LEFT SHOULDER]                          Onset:   
2023                                          Episodic  
   
                                                    Other connective   
tissue disease  
(1 source)                              Bicipital tendinitis,   
right shoulder;   
Translations:   
[BICIPITAL TENDINITIS   
RIGHT SHOULDER]                         Onset:   
2023                                          Episodic  
   
                                                    Other connective   
tissue disease  
(1 source)                Pain in right leg         Onset:   
02-                                          Episodic  
   
                                                    Other connective   
tissue disease  
(1 source)                Pain in left leg          Onset:   
02-                                          Episodic  
   
                                                    Other nervous system   
disorders  
(4 sources)                             Other specified   
mononeuropathies of   
right lower limb;   
Translations: [OTH   
SPEC MONONEUROPATH RT   
LOW LIMB]                               Onset:   
2023                                          Chronic  
   
                                                    Other nervous system   
disorders  
(2 sources)                             Other chronic pain;   
Translations: [OTHER   
CHRONIC PAIN]                           Onset:   
2022                                          Chronic  
   
                                                    Other nervous system   
disorders  
(1 source)                              Other specified   
mononeuropathies;   
Translations: [OTHER   
SPECIFIED   
MONONEUROPATHIES]                       Onset:   
04-                                          Chronic  
   
                                                    Other nervous system   
disorders  
(1 source)                              Chronic pain syndrome;   
Translations: [CHRONIC   
PAIN SYNDROME]                          Onset:   
2023                                          Chronic  
   
                                                    Other nervous system   
disorders  
(1 source)                              Other disturbances of   
skin sensation                          Onset:   
02-                                          Episodic  
   
                                                    Other non-traumatic   
joint disorders  
(3 sources)                             Pain in right hip;   
Translations: [Pain in   
right hip]                              Onset:   
2022                                          Episodic  
   
                                                    Other non-traumatic   
joint disorders  
(4 sources)                             Pain in left hip;   
Translations: [PAIN IN   
LEFT HIP]                               Onset:   
2023                                          Episodic  
   
                                                    Other nutritional;   
endocrine; and   
metabolic disorders  
(1 source)                              Obesity, unspecified;   
Translations: [OBESITY   
UNSPECIFIED]                            Onset:   
2023                                          Chronic  
   
                                                    Other nutritional;   
endocrine; and   
metabolic disorders  
(1 source)                              Body mass index (BMI)   
40.0-44.9, adult;   
Translations: [BODY   
MASS INDEX BMI   
40.0-44.9 ADULT]                        Onset:   
2023                                          Chronic  
   
                                                    Other nutritional;   
endocrine; and   
metabolic disorders  
(2 sources)                             Body mass index 30+ -   
obesity                                 2024          Chronic  
   
                                                    Other nutritional;   
endocrine; and   
metabolic disorders  
(2 sources)     Morbid obesity                  2024      Chronic  
   
                                                    Other nutritional;   
endocrine; and   
metabolic disorders  
(2 sources)     Obese                           2024      Chronic  
   
                                                    Other screening for   
suspected conditions   
(not mental disorders   
or infectious   
disease)  
(4 sources)                             Abnormal results of   
kidney function   
studies; Translations:   
[ABNORM RESULTS KIDNEY   
FUNCTION STDY]                          Onset:   
2023                                          Episodic  
   
                                                    Other skin disorders  
(2 sources)                             Epidermoid cyst;   
Translations:   
[Epidermal cyst]                        Onset:   
2024                                          Episodic  
   
                                                    Other skin disorders  
(2 sources)                             Epidermoid cyst of   
skin of neck                            2024          Episodic  
   
                                                    Spondylosis;   
intervertebral disc   
disorders; other back   
problems  
(7 sources)                             Other intervertebral   
disc degeneration,   
lumbar region;   
Translations:   
[Spondylosis without   
myelopathy or   
radiculopathy, lumbar   
region]                                 Onset:   
2022                                          Chronic  
   
                                                    Spondylosis;   
intervertebral disc   
disorders; other back   
problems  
(11 sources)                            Sacrococcygeal   
disorders, not   
elsewhere classified;   
Translations: [Spinal   
stenosis, site   
unspecified]                            Onset:   
10-                                          Episodic  
   
                                                    Syncope  
(4 sources)                             Syncope and collapse;   
Translations: [SYNCOPE   
AND COLLAPSE]                           Onset:   
2023                                          Episodic  
   
                                                    Thyroid disorders  
(2 sources)     Hypothyroidism                  2024      Chronic  
   
                                                    Unclassified  
(4 sources)                             LOW BACK PAIN,   
UNSPECIFIED;   
Translations: [LOW   
BACK PAIN,   
UNSPECIFIED]                            Onset:   
2022                                            
   
                                                    Urinary tract   
infections  
(1 source)                              Urinary tract   
infection, site not   
specified;   
Translations: [UTI   
SITE NOT SPECIFIED]                     Onset:   
2023                                          Episodic  
  
  
Past or Other Problems  
  
  
                      Problem Classification Problem    Date       Documented Da  
te Episodic/Chronic  
   
                                                    Deficiency and other   
anemia  
(1 source)                              Anemia,   
unspecified;   
Translations:   
[ANEMIA   
UNSPECIFIED]                            Onset:   
2022                                          Episodic  
   
                                                    Malaise and fatigue  
(1 source)                              Other fatigue;   
Translations:   
[OTHER FATIGUE]                         Onset:   
2022                                          Episodic  
   
                                                    Other connective tissue   
disease  
(1 source)                              Other muscle   
spasm;   
Translations:   
[OTHER MUSCLE   
SPASM]                                  Onset:   
2022                                          Episodic  
   
                                                    Unclassified  
(1 source)                              LOW BACK PAIN,   
UNSPECIFIED;   
Translations:   
[LOW BACK PAIN,   
UNSPECIFIED]                            Onset:   
2023                                            
  
  
  
Results  
  
  
                          Test Name    Value        Interpretation Reference   
[facility]                                   Facility  
   
                                                    Ambulatory Visit Summaryon 0  
2024   
   
                                                    Ambulatory Visit   
Summary                                 [Image Removed]  
SHERRIE LOZADA  
:1955  
MRN:[id number]  
Visit Date:2024  
Ambulatory Visit   
Instructions  
Your Care Team  
Attending Physician -  
JENELLE FAJARDO, Oh WELLS  
Primary Care Physician   
-  
Mag FAJARDO, Maki  
Referring Physician -  
Maki Veliz MD  
This Is Your   
Medications List  
aspirin (aspirin 81 mg   
Oral EC Tab)  
cholecalciferol   
(Vitamin D3 2000 intl   
units oral Tab)  
cranberry (Cranberry)  
duloxetine (duloxetine   
30 mg Cap-DR)  
hydrochlorothiazide   
(hydrochlorothiazide   
25 mg Tab)  
lactobacillus   
acidophilus   
(Acidophilus Probiotic   
Blend)  
liothyronine (Cytomel   
5 mcg Tab)  
losartan (losartan 25   
mg Tab)  
metformin (metformin   
500 mg Tab)  
metoprolol (metoprolol   
tartrate 100 mg Tab)  
omega-3   
polyunsaturated fatty   
acids (Fish Oil 1200   
mg oral capsule)  
pantoprazole   
(Pantoprazole 40 mg DR   
Tab)  
simvastatin   
(simvastatin 20 mg   
Tab)  
Procedures Performed  
Arthroplasty of left   
hip, Arthroplasty of   
left shoulder,   
Arthroplasty of right   
hip, Cholecystectomy,   
Colonoscopy,   
Cystectomy, EGD -   
esophagogastroduodenos  
copy, Excision of   
calcaneal spur,   
Excision of cyst,   
Radiofrequency   
ablation of nerve root   
of lumbar spine using   
fluoroscopic guidance,   
Rotator cuff repair.  
What to do next  
Scheduled Follow-Up   
Appointments  
   
2:00 PM EST  
With: Oh SALMERON MD  
Where: General Surgery   
Jenelle/Con[facility] Reynolds                                  [facility]  
   
                                                    General Surgery Office/Clini  
c Noteon 2024   
   
                                                    General Surgery   
Office/Clinic Note                      Chief Complaint  
in-office excisional   
biopsy  
HPI Staff  
Presents for in-office   
excisional biopsy   
right neck.  
History of Present   
Illness  
patient here for   
excisional biopsy of   
right neck epidermal   
cyst, no change since   
recent evaluation.  
Review of Systems  
ROS - Provider  
Constitutional: no   
fever, no sweats, no   
weight loss.  
Eyes: no glasses, no   
blurred vision, no   
visual loss.  
ENMT: no dentures, no   
hoarseness, no   
swallowing   
difficulties, no   
hearing loss, no ear   
infection(s), no nose   
bleeds.  
Cardiovascular: normal   
blood pressure, no   
chest pain, regular   
heartbeat, no heart   
murmur.  
Respiratory: no   
shortness of breath,   
no cough, no asthma,   
no wheezing.  
Gastrointestinal: no   
nausea, no vomiting,   
no diarrhea, no   
constipation, no blood   
in stool, no change in   
bowel habits, no   
abdominal pain, no   
hepatitis.  
Genitourinary: no   
kidney stones, no   
urine infection, no   
dysuria.  
Musculoskeletal: no   
pain, no weakness.  
Skin: no changing   
moles, no rash, yes   
skin lumps.  
Neurologic: no   
seizures, no epilepsy,   
no headache.  
Psychiatric: no   
emotional or   
psychiatric problem.  
Heme/Lymph: no   
bleeding problems, no   
anemia, no blood   
clots, no   
transfusions.  
Allergy/Immunologic:   
no swollen lymph   
nodes/glands, no IV   
drug abuse.  
Other:  
Additional ROS info:   
Except as noted in the   
above Review of   
Systems and in the   
History of Present   
Illness, all other   
systems have been   
reviewed and are   
negative or   
noncontributory.  
  
Physical Exam  
skin: 1 cm epidermal   
cyst right lateral   
neck, no inflammation   
or drainage.  
Procedure  
patient brought to the   
procedure room, placed   
in supine position,   
area prepped and   
draped in sterile   
fashion; anesthetized   
with 1 % lidocaine;   
lesion excised in   
elliptical fashion   
down to subcutaneous   
fat; closed with   
interrupted 4-0 nylon   
sutures; total length   
of incision 1.3 cm;   
tolerated well; ebl <   
3 ml; sterile dressing   
applied.  
Assessment/Plan  
1. Epidermal cyst of   
neck (L72.0: Epidermal   
cyst)  
excised under local   
anesthesia, tolerated   
well; may shower   
tomorrow, remove   
dressing and leave   
open to air; take   
ibuprofen as needed   
for pain. f/u in 7-10   
days for suture   
removal, call sooner   
if problems/questions.  
Follow-up  
No qualifying data   
available  
Problem List/Past   
Medical History  
Ongoing  
Acute combined   
systolic and diastolic   
heart failure..  
Anxiety  
BMI 35.0-35.9,adult  
Diabetes mellitus  
Epidermal cyst of neck  
Gastroesophageal   
reflux disease  
Hypercholesterolemia  
Hyperlipidemia  
Hypertensive disorder  
Hypothyroidism  
Lumbar spinal stenosis  
Morbid obesity  
Obese  
Historical  
No qualifying data  
Procedure/Surgical   
History  
Arthroplasty of left   
hip, Arthroplasty of   
left shoulder,   
Arthroplasty of right   
hip, Cholecystectomy,   
Colonoscopy,   
Cystectomy, EGD -   
esophagogastroduodenos  
copy, Excision of   
calcaneal spur,   
Excision of cyst,   
Radiofrequency   
ablation of nerve root   
of lumbar spine using   
fluoroscopic guidance,   
Rotator cuff repair.  
Medications  
Acidophilus Probiotic   
Blend  
aspirin 81 mg Oral EC   
Tab, 81 mg= 1 tab(s),   
Oral, Daily  
Cranberry  
Cytomel 5 mcg Tab, 5   
mcg= 1 tab(s), Oral,   
Daily  
duloxetine 30 mg   
Cap-DR, 1 cap(s),   
Oral, BID  
Fish Oil 1200 mg oral   
capsule, 1200 mg= 1   
cap(s), Oral, BID  
hydrochlorothiazide 25   
mg Tab, 25 mg= 1   
tab(s), Oral, Daily  
losartan 25 mg Tab, 25   
mg= 1 tab(s), Oral,   
Daily  
metformin 500 mg Tab,   
500 mg= 1 tab(s),   
Oral, BID  
metoprolol tartrate   
100 mg Tab, 100 mg= 1   
tab(s), Oral, BID  
Pantoprazole 40 mg DR   
Tab, 40 mg= 1 tab(s),   
Oral, Daily  
simvastatin 20 mg Tab,   
20 mg= 1 tab(s), Oral,   
qPM  
Vitamin D3 2000 intl   
units oral Tab, 2   
tab(s), Oral, Daily  
Allergies  
Latex (Skin rash)  
Nickel (Skin rash)  
Percocet (Itching)  
Vicodin (Itching)  
penicillin (Rash)  
Social History  
Alcohol - Denies   
Alcohol Use,   
2024  
Substance Abuse -   
Denies Substance   
Abuse, 2024  
Tobacco  
Former smoker, quit   
more than 30 days ago   
Tobacco Use:. Never   
Smokeless Tobacco   
Use:. Cigarettes, 0.25   
per day. Started age   
17.0 Years. Stopped   
age 54 Years.,   
2024  
Family History  
Dementia: Mother.  
Immunizations  
Vaccine Date Status   
Comments  
influenza virus   
vaccine, inactivated -   
Not Given Patient   
Refuses             Normal                                  [facility]  
   
                                        Comment on above:   Result Comment: Elec  
tronically Signed By: Oh SALMERON MD\.br\Date and Time Signed: 24 19:27 EST   
   
                                                    [place] 2024   
   
                                        L                   Specimen: [id number]   
Received:   
02/15/24-5 Status:   
DIMITRIOS Barbara Num: [id number]  
Spec Type: Surgical   
Subm Dr: Oh Salmeron MD FACS  
  
Tissues: A Skin Cyst   
(EPIDERM CYST NCECK)  
Procedures: HE,   
Gross/Micro L3  
  
  
Age/  
Patient Birth Sex   
Location Account   
Attending Physician  
  
  
Sherrie Lozada 68/F   
LABELL [id number]   
Oh Salmeron MD   
FACS  
  
  
  
SPEC NUM: [id number]   
RECD: 02/15/   
STATUS: DIMITRIOS JIMENEZ NUM:   
[id number]  
SALINAS:    
DR: Oh Salmeron MD   
FACS  
  
ENTERED: 02/15/   
[facility] DR: Merissa,Lab  
  
SPEC TYPE: Surgical   
DEPT: [facility]  
ENTERED BY: [id number]   
RECV BY: [id number]  
  
ORDERED: HE,   
Gross/Micro L3  
ORDERED: HE,   
Gross/Micro L3  
  
Pathological Diagnosis  
  
Cyst, Right Neck,   
Excision: Epidermal   
Inclusion Cyst.  
  
  
Clinical Information  
  
1 year history of   
epidermal cyst. No   
drainage, erythema or   
tenderness  
  
Gross Description  
  
Received in formalin   
labeled with the   
patient's name, date   
of birth and  R neck    
is a  
multifocally disrupted   
1.0 x 0.9 x 0.8 cm   
white-tan cystic   
nodule with potential   
overlying  
1.0 x 0.6 cm ellipse   
of gray-tan skin. The   
specimen is trisected   
perpendicular to the  
potential skin surface   
to demonstrate a   
cavitary space filled   
with white-gray   
material.  
Entirely submitted in   
one cassette labeled   
A1.  
  
CPT Codes  
  
18195  
----------------------  
----  
  
  
  
  
  
  
----------------------  
----  
Specimen: [id number]   
Received:   
02/15/ Status:   
DIMITRIOS Jimenez Num: [id number]  
Spec Type: Surgical   
Subm Dr: Oh Salmeron MD FACS  
  
Tissues: A Skin Cyst   
(EPIDERM CYST NCECK)  
Procedures: HE,   
Gross/Micro L3  
----------------------  
----  
Patient: Sherrie Lozada [id number]   
(Continued)  
----------------------  
----  
  
  
Signed   
__________(signature   
on file)___________   
Alejandro Perez MD   
24 1047       [facility]  
   
                                                    Ambulatory Visit Summaryon 0  
2024   
   
                                                    Ambulatory Visit   
Summary                                 [Image Removed]  
SHERRIE LOZADA  
:1955  
MRN:[id number]  
Visit Date:2024  
Ambulatory Visit   
Instructions  
Your Care Team  
Attending Physician -  
JENELLE FAJARDO, Oh WELLS  
Primary Care Physician   
-  
Mag FAJARDO, Maki  
Referring Physician -  
Maki Veliz MD  
This Is Your   
Medications List  
Contact prescribing   
physician if questions   
or concerns  
aspirin (aspirin 81 mg   
Oral EC Tab)  
cholecalciferol   
(Vitamin D3 2000 intl   
units oral Tab)  
cranberry (Cranberry)  
duloxetine (duloxetine   
30 mg Cap-DR)  
hydrochlorothiazide   
(hydrochlorothiazide   
25 mg Tab)  
lactobacillus   
acidophilus   
(Acidophilus Probiotic   
Blend)  
liothyronine (Cytomel   
5 mcg Tab)  
losartan (losartan 25   
mg Tab)  
metformin (metformin   
500 mg Tab)  
metoprolol (metoprolol   
tartrate 100 mg Tab)  
omega-3   
polyunsaturated fatty   
acids (Fish Oil 1200   
mg oral capsule)  
pantoprazole   
(Pantoprazole 40 mg DR   
Tab)  
simvastatin   
(simvastatin 20 mg   
Tab)  
Procedures Performed  
Arthroplasty of left   
hip, Arthroplasty of   
left shoulder,   
Arthroplasty of right   
hip, Cholecystectomy,   
Colonoscopy,   
Cystectomy, EGD -   
esophagogastroduodenos  
copy, Excision of   
calcaneal spur,   
Excision of cyst,   
Radiofrequency   
ablation of nerve root   
of lumbar spine using   
fluoroscopic guidance,   
Rotator cuff repair.  
Discharge Vitals  
Heart Rate   
(Peripheral)  
72  
Respiratory Rate  
16  
Blood Pressure  
130/86  
Height  
162.5 cm  
Height  
64 in  
Weight  
92.8 kg  
Weight  
204.16 lb  
BMI  
35.14  
What to do next  
Scheduled Follow-Up   
Appointments  
 2:00 PM EST  
With: Oh SALMERON MD  
Where: General Surgery   
Nill/Said Merissa  Normal                                  [facility]  
   
                                                    Facesheeton 2024   
   
                                        Facesheet           [phone]  
10  
[id number]#1  
.00TIFF             Normal                                  [facility]  
   
                                                    Physician Referralon   
024   
   
                                        Physician Referral  [phone]  
10  
[id number]#1.0  
0TIFF               Normal                                  [facility]  
   
                                        Physician Referral  [phone]  
10  
[id number]#1.0  
0TIFF               [facility]  
   
                                                    Coding Queryon 2023   
   
                                        Coding Query        [phone]  
06  
[id number]#1.00  
[facility]  
   
                                                    MAGR Postoperative Recordon   
2023   
   
                                                    MAGR Postoperative   
Record                                  MAGR Phase II Record   
Summary  
Primary Physician:   
Jose Abraham DO  
Case Number:   
[id number]  
Finalized Date/Time:   
23 07:41:18  
Pt. Name: SHERRIE LOZADA/Sex: 1955   
FEMALE  
Med Rec #: [id number]  
Physician: Jose Abraham DO  
Financial #: [id number]  
Pt. Type: O  
Room/Bed: [facility]/1  
Admit/Disch: 23   
08:33:12 -  
23 15:10:00  
Institution:  
Phase II Case Times   
MAGR  
Pre-Care Text:  
Patient is free from   
s/s of injury. Patient   
remains free from   
compromised physical   
state related to   
surgery or  
anesthesia. Patient   
comfort maintained.   
Patient/family   
verbalize   
understanding of   
discharge   
instructions.  
Entry 1  
In PACU II 23   
14:15:00 Discharge   
from PACU 23   
15:00:00  
II  
Last Modified By:   
Shari Storey RN   
23  
07:41:16  
Post-Care Text:  
The patient remains   
free from s/s of   
injury. Patient's   
vital signs stable,   
circulation   
maintained, return to  
preop mental and   
physical status,   
opsite/dressing   
intact, minimal or   
absent nausea and   
vomiting, tolerates po  
intake. Patient   
verbalizes adequate   
pain control.   
Patient/family express   
understanding of   
discharge  
instructions.  
General Comments:  
2 [facility] PHASE II  
Finalized By: Shari Storey RN  
Document Signatures  
Signed By:  
Shari Storey RN   
23 07:41      [facility]  
   
                                                    Coding Summaryon 2023   
   
                                        Coding Summary      HTMLBase 64   
DlnzeujfKNo0dWq+PGhlYW  
Q+KY7EQTTuS62dxEOcwL9q  
L3ZMRJnALwnhQHRDTOzJYd  
PvroCdRT4srPUpBRAq  
IC8+BI9vDWXbYoymyCPwp0  
M3fWK4U80fto0sHIllfYT3  
VPUgPyOanhhwf6gkmKr0HU  
cuNmluOyBt  
NSLdkD19BBM0kW77Us29rK  
IvqYCoy1kzxRs9KvWuVUCr  
FBY5fIkzWIqkt6NlHXMjW0  
8wiGAza8M2  
MPAkpUwjgFJtLcPscYT3uP  
1gIGshlguso2rrqykaXng4  
dh62rZFgt0I8sTB5O7Lppy  
J8TUJqbCCu  
EtxicWGSeL3sqkcam7eful  
odBhTaOPCpACh1JWu1FMIi  
nXmjSbLlFE39YWW9IIPrgp  
PyT5RlSEXl  
xDgcRpK8e3P7Dd8ZP2NGJv  
tvU6WMPRJUAAfvsJP+PC90  
pp17D3HeQcvcQqq0DPLbWF  
Z9zZH0gV0k  
LUMuXEcuj5X1sXQ3N3Qgfq  
Tswt8vm1dtOTDgPXmgI47o  
rCEdm4C8WKFgaVA5YXXcnV  
ujEbHriT83  
Oyc+LHRtiOqxj9LjMqboo0  
chz3ojfTd9MhloUOTgdbXc  
dMgrPAH1o0MyNo7vZIBdbD  
G6xCJ9lF2a  
XfYwMiV1BLlkJ861TnQmwF  
RxCnxnH80kX4PmdKI+PHRy  
Any0ZFWgeJqcCB2nT2OkRE  
RpbmctbGVm  
zUxqPV1dNEDamfbdFHRxkG  
2aWYTiC6w5DxJwBbW7YDmv  
A4XvIVPoehhqSz45mO2gPw  
LyLwZ3LCua  
G2KgylS3LQPzoPQsHNyyRG  
C6J06wc8S7CRVjAUPvYIY4  
kXJ2aT4qeBjdxkfelJZcdL  
sgdmVydGlj  
SDzxBZwfV156EYTviGzyKh  
NvZGluZyBEYXRlOiAgMDkv  
MjAvMjAyMzwvdGQ+PHRkIH  
J8cPihKKAz  
jOWuAUxbRn4slFtjqVjqAI  
8kMMWgmnajCHFenJ2aOTPt  
nPTkiWwxOE7dBMGlqwzog2  
82AsSfKKX4  
VVMdmJYyE3GzpI1gDoIpTI  
KuAFJlQ4TkjQWwRYtdR165  
BShnWcY9WKEtauVyS8XsWU  
FsaWduOiB0  
c6M3Iu3Iz4ThksceK1KsaM  
OaBwBrWzonLNf1D4YmPnaq  
dHI+DU59WTSwAR73VXh1PT  
K9aWkqBXok  
EASzQ5ZgvP3mRtQqGGZrLT  
RkOyc+PHRhYmxlIHdpZHRo  
OBifQAZcZpBpxPbxHY8bTm  
9yZGVyLWNv  
gRzeqBIeNiVjl6bgWBMcDW  
rjPU2qzMowA6RaxEF0XSQt  
z7x1Go02P66lD2QinTB+PG  
QnbTE6zXK5  
xT6mGlMmFpL5QFchY961Ay  
PnaITiHcsfc4oui4rklRd6  
PqG2QEGpjlWxcNdtRWP0r8  
ZlVx81J75g  
IHdpZHRoPSIxNSUiIHZhbG  
zutl9yyO5tGz6+PGNvbCB3  
kQJ6bE3eOfKgBlN2XIjzC8  
49InRvcCIv  
Hbkqi5kti3sffYs9DgCyAA  
TnnaBedCooHBS4y7KdEl59  
O7RpzMfzk9DbUhz0cl96nH  
Pnq4S3gHA0  
C3WsVPJgympvgNQpvYidCP  
6tVNOpqixbVICeaL0xEBDw  
W3t2EpZkOyD6CJooU6Cpoz  
F5BQHzhHTj  
VTObsYFBnZ7ezicfe9trpm  
gcJkApJXDqOGe2YIg6TUOh  
gKzmCrYsVUA5GlX6TDH6tR  
AlbL0gcRly  
opduiU3uIln+JTS3gLMvgO  
VHYA1eJwxtnAY+PHRkIHN0  
jPxkSNbyGOPtzL1vATMwM6  
z4HyMsJsP9  
TBloR3BmazL0WZLlpPGrDF  
TwhCZPkY4snpspv5fewkpr  
NxSvITVnTUg6SLb0UJCkiG  
duOiBsZWZ0  
GqH4NHH0pQPexD6dhLqbqs  
xxxZ3iOmo+QmlydGggRGF0  
NCw5A5NvDmf1GNXasSynFM  
0ncGFkZGlu  
Hq2ewBrlhLndBY7fTGVcni  
bzo255WoPso4qdWOMtkHZp  
UHnlYXH7K35od2E7AOEyLF  
MzLQS1uHJ7  
yM9bfNufmqlprSSlpQtkin  
YiwDxlVPfnLWirZ264NIMx  
xSusQvMaZAb9F4JcFpk2CK  
CsiGfhFQ6o  
lOPlJTduIn0cyPlgaLmdSN  
6vCWJrtznix418AbAvb8uh  
PYBkfHNcGLaiFWK0Y62ew6  
R7KBRhNPXb  
FSW8eRO0fF6zbApqexsskR  
VmdDsgdmVydGljYWwtYWxp  
A539QWTqrGsqZoViyUm8T5  
BeMka2OWVi  
nPryJB5cuGLkVSvhZh5fjO  
lntTbzQR4xGXCzxgcbi307  
SjYvh4ooTWMdkRHkBCxxNW  
M9J62km1B8  
OQUuBDAiLXJ1xWK6oE5mbC  
lnbjogbGVmdDsgdmVydGlj  
ZEwmDQpnY723MPTxwCpzYa  
BhdGllbnQg  
WJbpAOo9V1JvBurweAA+PC  
23VTMrMH84rSKjyJOfb9qw  
zDf7WvPsRCHqHVA5wVmmDS  
rmi4LrKWRw  
X06ckWLug6G3MWQlwPltuX  
AaAfGdaRR4gS7gPUgwdxlb  
x4ofohvsGmbwi5bwck23dU  
05B30rFJgn  
ZHRoPSIzMCUiIHZhbGlnbj  
9qfF1cVn2+JDFwoPE3hKT3  
sL1eSWAeRuQ1GWipB752Cf  
RvcCIvPjxj  
b9ksd4lsyEa8FzS7FXOjcj  
CzcEiwJPB2b1LzVn33T04y  
IHdpZHRoPSIyMCUiIHZhbG  
gprh8taN0q  
Ii8+QNOkgFK6uSD8hJ7kCd  
PwRhS3EXgpJ116PyBtcRHu  
BdooP12jL1KiuEH+PHRyPj  
v3VUEhdSqz  
TY6qkATjJFkiMc1kSBH0Zg  
HbOsCqOZelX7LfVWIjqwqj  
tkugeIU8FDOvJLMeaO73Zx  
9udDogMTBw  
qWIBnE3zcuqqy8hfgtmlSt  
NqVYJiWHp8IZt0OCKmhIdq  
HzYjEIY2IrB1VSK5lNUipT  
1hbGlnbjog  
qK0iY7GbFYKozmvjEj60rN  
8dYyLmVhX4VQhsSjk+Sk9O  
RNYbKKQQSm7ZMQkbRSgGXw  
wvdGQ+PHRk  
EYY1qLvmPRnlOLXboM0jSP  
KjJ4u6WcGaWvQ0AXohZ5Bn  
RUSfjftgWe46tY5uKyLmYe  
A7IYidI0Kh  
qlX3WLIbeMVzLMvgYPM8A4  
7or0T4GQNkDVIqYXB8qQO6  
hV1mqEufcukvpJKemBands  
VydGljYWwt  
RPbcJ373WJWoxBxtGxKnFo  
I9FlV2MOh0A3PdOpx6BRGd  
nDmgPG4ufQZdTJfhQr9wrY  
itfOtzKG3f  
JFYwkztgUPYsyE6yWRBgqO  
VmeXowGD2lCCTbbupit833  
GqZeHAT6OKLfdNShR0YuvN  
9yOiAjMDAw  
ORDuU1LyoRNhZWduD784BQ  
vkYbA6RXSkskNnM1VmAOBz  
oYljZcE5a5N7Al51IYECLF  
FyczwvdGQ+  
YOXfHGV0sVmwZFgnGQMpoS  
5vKJKnI0z8YuJqDzJ3IIdq  
Z5MeZSQhshjvOp17eV8vBq  
PcArH2VJkk  
M3SqmnH6YADlnSYyDDttAT  
Z7Y35vv0E3VBAtARHwGWZ4  
ySX4eQ9wjDyagyuhqMOhoS  
sgdmVydGlj  
CCuqWOclP899DMSrnZlnFz  
ZFTUFMRTwvdGQ+PHRkIHN0  
bXlmEZvuQYBddJ6wVGPrM6  
y4MmChQrG8  
TVjfL0WcPTZdginqJa69kN  
6pOvDrMyO2FYjwV9JrynF1  
BVZjzQSuMMoiYGO2J46ai9  
D1HGSlDKHa  
TST9vIS1dY9hgBlrlkdhmS  
VmdDsgdmVydGljYWwtYWxp  
I213GSIsgKyqLx9di7Fnkp  
E6pS7bPM80  
BB09T5PqCuowhZMmwZL+PH  
RhYmxlIHdpZHRoPScxMDAl  
DxGjdRqqDX6lNi0hPMFpTU  
NvbGxhcHNl  
RxPhc6xyISAfNPrnFR5qmT  
rcL8WgkCH1NTPdz4d9Om20  
L49sM2PbeJW+RQEhlBI4dZ  
Y7mA0uGuOt  
OjU7JKrvL050ZtBupXXbPf  
rih7xlq5jnhOu7EvKhITUq  
szNisRenOKJ7a4LiRz23A7  
9sIHdpZHRo  
TALxJHVpCUVvyLfqyy6ebA  
9wIi8+ZTPcxOO6eDQ2hS6i  
ZqReXbX6GRzpK078IgKcpN  
NePzybK90t  
K7IyfFK+LBZpMqb0XREivF  
fbNK2tlSSpOPhlBj0hAJN5  
BdUvLaXjMIamX7QoROTaof  
ctcmlnaHQ6  
YNWuVWHmkS28Hn6cqMlpJz  
8zTORaNSD2OYHnsKGlT1Rl  
sJ8vAsDzSVKuTLNeT5WtrX  
IjWNefM449  
XQgoDyU5FOTupjHyG8XjSM  
SihEolDmN9f3L2Ow5HcHce  
sLYdCB4qIfUcJUs0I2NwBf  
a0NAJruZww  
EN4lxEVvEVkhAb4jmHclcY  
qkQB8nATBwoafna623CxRp  
u6leONDcqFQnTEyvFPQ4S1  
6dc7K6ISXj  
OJPsFKI6kTS3sX4qbHdbdt  
ogbGVmdDsgdmVydGljYWwt  
OQasO249RUBwuTzxIdNTMi  
i9X6ZqGrn7  
ALFszZamJJ3vtCMlVFunBz  
8ovWvbyYqsOB1iQUKabolf  
r377LyZjq2ytYGBnaDEjFA  
hxFNO5J30z  
n2K5IMQxXDOzRIJ9eDZ2vL  
1hbGlnbjogbGVmdDsgdmVy  
qMtjQKtgMLbpY465DMTawM  
oyOo7MNkk4  
E8WxXzz7YMZhuYhjWY1vfE  
QrRFumUz7znAihzSxaJP2n  
MUBnkzetk182CcMsg0thUP  
EwcHQgVGlt  
JRU4I57bo3D2NTKzULJuIK  
F4fEN5uD9okEjmnprokREs  
dDsgdmVydGljYWwtYWxpZ2  
46IHRvcDsn  
PlBheWVyOjwvdGQ+PC90cj  
21C4TaMpzvKqr9XRCsVAR2  
pCS9uL5hENTuBXcfm2Y6eX  
U5O7SnqwTk  
ci1 (more content not   
included)...        [facility]  
   
                                                    Consent Formson 2023   
   
                                        Consent Forms       [phone]  
  
[id number]#1.00  
[facility]  
   
                                                    Discharge Instructionson    
   
                                                    Discharge   
Instructions                            [id number]  
[id number]#1.00  
[facility]  
   
                                                    Outside Recordson 2023  
   
   
                                        Outside Records     [phone]  
[id number]#1.00  
[facility]  
   
                                                    Provider Orderson 2023  
   
   
                                        Provider Orders     [phone]  
[id number]#1.00  
[facility]  
   
                                                    Telemetry Stripson 09-  
3   
   
                                        Telemetry Strips    [phone]  
90  
[id number]#1.0  
0[facility]  
   
                                                    Consultation/Specialist Note  
on 2023   
   
                                                    Consultation/Specia  
list Note                               Patient: SHERRIE LOZADA MRN:   
[id number] FIN: [id number]  
Age: 67 years Sex:   
FEMALE : 1955  
Associated Diagnoses:   
None  
Author: NIKOLAI SOTO  
Basic Information  
1 day s/p LT reverse   
TSA (DOS 23)  
Subjective  
pt doing well, she   
notes she is looking   
forward to going home   
today, pain is ok at   
this point, she notes   
she is still feeling   
the block at this   
point.  
Review of Systems  
denies nausea  
Health Status  
Allergies:  
Allergic Reactions   
(All)  
Moderate  
Latex- Blister.  
Percocet- Itching.  
Vicodin- Itching.  
Mild  
Nickel- Rash.  
Penicillin- Rash.  
Objective  
dressing the left   
shoulder is clean dry   
and intact, kryo cuff   
intact, arm sling on,   
able to make a fist,   
flex and extend the   
wrist and has a little   
flexion and extension   
of the elbow, nv   
intact distally  
Impression and Plan  
d/c home today, f/U in   
office 7-10 days s/p   
surgery  
[Electronically Signed   
on: 2023 08:48   
EDT]  
______________________  
__________________  
NIKOLAI SOTO   
[Verified on:   
2023 08:48 EDT]  
______________________  
__________________  
SUMIMAMTA NIKOLAI       [facility]  
   
                                                    Discharge Noteon 2023   
   
                                        Discharge Note      discharge instructio  
ns   
reviewed with patient   
and daughter,   
belongings packed,   
skin wam and dry, foam   
dressing dry, polar   
care sent home with   
patient, denies pain,   
awaiting wheelchair to   
private vehicle.  
[Electronically Signed   
on: 2023 15:08   
EDT]  
______________________  
__________________  
Pia Carlson RN   
[Verified on:   
2023 15:08 EDT]  
______________________  
__________________  
Pia Carlson RN       [facility]  
   
                                        Discharge Note      patient refusing   
discharge instructions   
at this time, patient   
is choosing to wait   
for daughter to be her   
for instructions, when   
daughter arrives will   
review discharge   
instructions.  
[Electronically Signed   
on: 2023 13:57   
EDT]  
______________________  
__________________  
Pia Carlson RN   
[Verified on:   
2023 13:57 EDT]  
______________________  
__________________  
Pia Carlson RN       Normal                                  [facility]  
   
                                                    Inpatient Patient Summaryon   
2023   
   
                                                    Inpatient Patient   
Summary                                 [place] [address]  
[phone]  
Patient Discharge   
Instructions  
Name: SHERRIE LOZADA  
: 1955 MRN:   
[id number] FIN: [id number]  
Patient Address: [address] [place] [address]  
Primary Care Provider:  
Name: MAKI VELIZ  
Phone: [phone]  
After you are   
discharged if you find   
you have any   
questions, please,   
call [phone] ext   
[phone] to speak to a   
nurse.  
The Pharmacy at   
[facility] is open   
Monday through Friday   
from 9A to 6P and   
Saturday and    
from 9A to 5P  
Discharge Diagnosis:   
Arthritis of left   
glenohumeral joint  
Prescription   
Information: If you   
have been given a   
prescription for   
narcotics, seek   
immediate medical   
attention if you have   
any difficulty   
breathing or any   
sudden status changes   
such as confusion and   
sleepiness.  
If you or anyone you   
know is experiencing   
suicidal thoughts,   
mental health, alcohol   
and/or drug addiction   
problems; contact the   
[facility]    
Crisis Hotline   
[phone] -Text   
4HCNG mo 737337.  
If you received any   
narcotics, sedation,   
or any other   
medication that causes   
drowsiness for the   
next 24 hours, unless   
otherwise directed:  
? Do not drive a car.  
? Do not operate   
machinery such as   
power tools, lawn   
mowers, drills, sewing   
machines, or stoves  
? Avoid alcoholic   
beverages and drugs   
for allergies, nerves,   
or sleep  
? Do not make   
important personal or   
business decisions or   
sign any legal   
documents  
[facility]   
would like to thank   
you for allowing us to   
assist you with your   
healthcare needs. The   
following includes   
patient education   
materials and   
information regarding   
your injury/illness.  
SHERRIE LOZADA   
has been given the   
following list of   
follow-up   
instructions,   
prescriptions, and   
patient education   
materials:  
Follow-up Instructions  
With: Address: When:  
Jose Abraham [address] [place] [address],   
[address] [place] [id number] [phone]   
Business (1)   
2023 10:45 AM  
Medications  
During the course of   
your visit, your   
medication list was   
updated with the most   
current information.   
The details of those   
changes are reflected   
below:  
Medications That Were   
Updated - Follow Below   
Instructions  
Other Medications  
Updated: cranberry   
(Cranberry oral   
capsule) 1 tab(s) Oral   
2 times a day.  
Medications to   
Continue That Have Not   
Changed  
Other Medications  
ascorbic acid (Vitamin   
C 1000 mg oral tablet)   
3 tab(s) Oral every   
day.  
aspirin (aspirin 81 mg   
oral delayed release   
tablet) 1 tab(s) Oral   
every day.  
baclofen (baclofen 10   
mg oral tablet) 1   
tab(s) Oral every day.  
biotin (biotin 5000   
mcg oral capsule) 1   
cap(s) Oral every day.  
cholecalciferol   
(Vitamin D3 1000 intl   
units oral tablet) 1   
tab(s) Oral 2 times a   
day.  
garlic (Garlic Oil   
oral capsule) 1 cap(s)   
Oral 2 times a day.  
hydroCHLOROthiazide   
(hydroCHLOROthiazide   
25 mg oral tablet) 1   
tab(s) Oral every day.  
liothyronine   
(liothyronine 5 mcg   
oral tablet) 1 tab(s)   
Oral every day.  
losartan (losartan 25   
mg oral tablet) 1   
tab(s) Oral every day.  
magnesium gluconate   
(magnesium gluconate   
250 mg oral tablet) 1   
tab(s) Oral every day.  
metFORMIN (metFORMIN   
500 mg oral tablet) 1   
tab(s) Oral 2 times a   
day.  
metoprolol (metoprolol   
tartrate 25 mg oral   
tablet) 1 tab(s) Oral   
2 times a day.  
omega-3   
polyunsaturated fatty   
acids (Fish Oil 1000   
mg oral capsule) 1   
cap(s) Oral 2 times a   
day.  
pantoprazole   
(pantoprazole 40 mg   
oral delayed release   
tablet) 1 tab(s) Oral   
every day.  
simvastatin   
(simvastatin 20 mg   
oral tablet) 1 tab(s)   
Oral every day.  
Template Non-Formulary   
(beet root) Oral every   
day.  
Template Non-Formulary   
(veggie and fruit   
tablet) Oral 2 times a   
day.  
traMADol (traMADol 50   
mg oral tablet) 1   
tab(s) Oral Every 12   
hours scheduled time   
as needed as needed   
for pain.  
zinc gluconate (zinc   
(as gluconate) 50 mg   
oral tablet) 1 tab(s)   
Oral every day.  
It is important to   
always keep an active   
list of medications   
available so that you   
can share with other   
providers and manage   
your medications   
appropriately. As an   
additional courtesy,   
we are also providing   
you with your final   
active medications   
list that you can keep   
with you.  
ascorbic acid (Vitamin   
C 1000 mg oral tablet)   
3 tab(s) Oral every   
day.  
aspirin (aspirin 81 mg   
oral delayed release   
tablet) 1 tab(s) Oral   
every day.  
baclofen (baclofen 10   
mg oral tablet) 1   
tab(s) Oral every day.  
biotin (biotin 5000   
mcg oral capsule) 1   
cap(s) Oral every day.  
cholecalciferol   
(Vitamin D3 1000 intl   
units oral tablet) 1   
tab(s) Oral 2 times a   
day.  
cranberry (Cranberry   
oral capsule) 1 tab(s)   
Oral 2 times a day.  
garlic (Garlic Oil   
oral capsule) 1 cap(s)   
Oral 2 times a day.  
hydroCHLOROthiazide   
(hydroCHLOROthiazide   
25 mg oral tablet) 1   
tab(s) Oral every day.  
liothyronine   
(liothyronine 5 mcg   
oral tablet) 1 tab(s)   
Oral every day.  
losartan (losartan 25   
mg oral tablet) 1   
tab(s) Oral every day.  
magnesium gluconate   
(magnesium gluconate   
250 mg oral tablet) 1   
tab(s) Oral every day.  
metFORMIN (metFORMIN   
500 mg oral tablet) 1   
tab(s) Oral 2 times a   
da (more content not   
included)...        Normal                                  [facility]  
   
                                                    Anesthesia Noteon 2023  
   
   
                                        Anesthesia Note     Patient: SHERRIE LOZADA MRN:   
[id number] FIN: [id number]  
Age: 67 years Sex:   
FEMALE : 1955  
Associated Diagnoses:   
None  
Author: Miguel Salgado MD  
Postoperative   
Information  
Post Operative Note:   
Operative Day.  
Anesthetic utilized:   
General.  
Health Status  
Allergies:  
Allergic Reactions   
(All)  
Moderate  
Latex- Blister.  
Percocet- Itching.  
Vicodin- Itching.  
Mild  
Nickel- Rash.  
Penicillin- Rash.  
Problem list:  
All Problems  
Hypertension / SNOMED   
CT [id number] /   
Confirmed  
Type 2 diabetes   
mellitus / SNOMED CT   
[id number] / Confirmed  
Physical Examination  
Vital Signs (last 24   
hrs)_____ Last   
Charted___________  
Heart Rate Monitored L   
58 bpm (SEP 18 13:31)  
Resp Rate 16 br/min   
(SEP 18 13:31)  
 mmHg (SEP 18   
13:31)  
DBP 68 mmHg (SEP 18   
13:31)  
Review / Management  
Condition: Stable.  
Assessment  
Anesthetic outcome  
No anesthetic   
complications noted.  
Adequate pain relief.  
awake, no pain.   
Hydration appears   
adequate.  
No Complaint of nausea   
and vomiting.  
Plan  
Transfer/ Discharge:   
Patient can be   
discharged from PACU   
when criteria met.  
Condition good.  
[Electronically Signed   
on: 2023 13:36   
EDT]  
______________________  
__________________  
Miguel Salgado MD   
[Verified on:   
2023 13:36 EDT]  
______________________  
__________________  
Miguel Salgado MD  [facility]  
   
                                        Anesthesia Note     Patient: SHERRIE LOZADA MRN:   
[id number] FIN: [id number]  
Age: 67 years Sex:   
FEMALE : 1955  
Associated Diagnoses:   
None  
Author: Miguel Salgado MD  
Preoperative   
Information  
Anesthesia history:  
Patient history: No   
difficult intubation,   
No malignant   
hyperthermia.  
Family history: No   
malignant   
hyperthermia.  
Review of Systems  
Constitutional:   
Negative.  
Respiratory: Negative,   
No shortness of   
breath.  
Cardiovascular: No   
chest pain.  
Neurologic: Alert and   
oriented X4.  
Health Status  
Allergies:  
Allergic Reactions   
(All)  
Moderate  
Latex- Blister.  
Percocet- Itching.  
Vicodin- Itching.  
Mild  
Nickel- Rash.  
Penicillin- Rash.  
Current medications:  
Home Medications (20)   
Active  
aspirin 81 mg oral   
delayed release tablet   
81 mg = 1 tab(s), PO,   
Daily  
baclofen 10 mg oral   
tablet 10 mg = 1   
tab(s), PO, Daily  
beet root , PO, Daily  
biotin 5,000 unit(s),   
PO, Daily  
Cranberry oral capsule   
1 tab(s), PO, BID  
Fish Oil 1000 mg oral   
capsule 1,000 mg = 1   
cap(s), PO, BID  
Garlic Oil oral   
capsule 1 cap(s), PO,   
BID  
hydroCHLOROthiazide 25   
mg oral tablet 25 mg =   
1 tab(s), PO, Daily  
liothyronine 5 mcg   
oral tablet 5 mcg = 1   
tab(s), PO, Daily  
losartan 25 mg oral   
tablet 25 mg = 1   
tab(s), PO, Daily  
magnesium gluconate   
250 mg oral tablet 250   
mg = 1 tab(s), PO,   
Daily  
metFORMIN 500 mg oral   
tablet 500 mg = 1   
tab(s), PO, BID  
metoprolol tartrate 25   
mg oral tablet 25 mg =   
1 tab(s), PO, BID  
pantoprazole 40 mg   
oral delayed release   
tablet 40 mg = 1   
tab(s), PO, Daily  
simvastatin 20 mg oral   
tablet 20 mg = 1   
tab(s), PO, Daily  
traMADol 50 mg oral   
tablet 50 mg = 1   
tab(s), PRN, PO, q12hr  
veggie and fruit   
tablet , PO, BID  
Vitamin C 1000 mg oral   
tablet 3,000 mg = 3   
tab(s), PO, Daily  
Vitamin D3 1000 intl   
units oral tablet 25   
mcg = 1 tab(s), PO,   
BID  
zinc (as gluconate) 50   
mg oral tablet 50 mg =   
1 tab(s), PO, Daily  
Problem list:  
All Problems  
Hypertension / SNOMED   
CT 7616761385 /   
Confirmed  
Type 2 diabetes   
mellitus / SNOMED CT   
[id number] / Confirmed  
Histories  
Family History:  
Entire family history   
is negative.  
Procedure history:  
Cholecystectomy   
(88669690).  
Back (545087104).  
Comments:  
2023 13:11 Bozena Mann RN  
cyst removed  
Hip arthroplasty   
(861873452).  
Arthroscopic repair of   
rotator cuff.   
(8088991486).  
Heel (302863814).  
Comments:  
2023 13:11 Bozena Mann RN  
heel spur  
Social History  
Electronic   
Cigarette/Vaping   
Assessment  
Electronic Cigarette   
Use: Never.  
Alcohol Assessment  
Use: Past.  
Tobacco Assessment  
Former tobacco user   
Tobacco Use:.  
Substance Abuse   
Assessment  
Substance use: Never.  
.  
Social & Psychosocial   
Habits  
Alcohol  
2023 Alcohol   
Use: Past  
Substance Use  
2023 Substance   
use: Never  
Tobacco  
2023 Smoking   
tobacco use: Former   
tobacco user  
Electronic   
Cigarette/Vaping  
2023 Electronic   
Cigarette Use: Never  
.  
Physical Examination  
Vital Signs (last 24   
hrs)_____ Last   
Charted___________  
Heart Rate Monitored L   
58 bpm (SEP 18 10:40)  
Resp Rate 14 br/min   
(SEP 18 10:40)  
SBP H 149 mmHg (SEP 18   
10:40)  
DBP L 58 mmHg (SEP 18   
10:40)  
Airway:  
Mallampati   
classification: II   
(soft palate, fauces,   
uvula visible).  
Temporomandibular   
joint mobility: Good.  
Mouth: Adequate   
opening, Teeth (   
unremarkable ).  
Neck: Supple,   
Non-tender, Full range   
of motion, very thick,   
full neck.  
Respiratory: Lungs are   
clear to auscultation.  
Cardiovascular:   
Regular rhythm.  
Neurologic: Alert,   
Oriented.  
Review / Management  
Laboratory Results  
Plan  
American Society of   
Anesthesiologists   
(ASA) physical status   
classification: Class   
II.  
Anesthetic   
Preoperative Plan  
Anesthesia: General.  
, Regional   
(Interscalene Block,   
for post op pain   
control per surgeon   
request). Anesthetic   
plan, risks, benefits,   
and alternatives   
discussed with the   
patient and/or family.   
Patient verbalized   
understanding.   
Informed consent was   
given. Consent was   
signed by the patient.  
[Electronically Signed   
on: 2023 10:46   
EDT]  
______________________  
__________________  
Miguel Salgado MD   
[Verified on:   
2023 10:46 EDT]  
______________________  
__________________  
Miguel Salgado MD  [facility]  
   
                                                    MAGR Intraoperative Recordon  
 2023   
   
                                                    MAGR Intraoperative   
Record                                  MAGR Intra-Op Record   
Summary  
Primary Physician:   
Jose Abraham DO  
Case Number:   
[id number]  
Finalized Date/Time:   
23 13:29:31  
Pt. Name: GRAY LOZADANILE MIN./Sex: 1955   
FEMALE  
Med Rec #: [id number]  
Physician: Jose Abraham DO  
Financial #: [id number]  
Pt. Type: D  
Room/Bed: [facility]  
Admit/Disch: 23   
08:33:12 -  
Institution:  
Case Times MAGR  
Entry 1  
Patient  
In Room Time 23   
11:14:00 Out Room Time   
23 13:25:00  
Anesthesia  
Start Time 23   
11:13:00 Stop Time   
23 13:29:00  
Surgery  
Start Time 23   
11:52:00 Stop Time   
23 13:17:00  
Last Modified By:   
Lexi Rothman RN  
23 13:29:26  
Case Attendance MAGR  
Entry 1 Entry 2 Entry   
3  
Case Attendee   
Jose Abraham Diane RN Kerro, Robert M MD Andrew DO  
Role Performed Surgeon   
- Primary Circulator   
Anesthesiologist of  
Record  
Time In 23   
11:28:00 23   
11:14:00 23   
11:14:00  
Time Out 23   
13:05:00 23   
13:25:00 23   
13:25:00  
Procedure Arthroplasty   
Shoulder Arthroplasty   
Shoulder Arthroplasty   
Shoulder  
Total Reverse(Left)   
Last Modified By:   
Lexi Rothman RN, Diane RN Kokinda, Diane RN  
23 13:25:19   
Entry 4 Entry 5 Entry   
6  
Case Attendee Elayne Napier   
HairYumiko corona CST, Kelly CST  
CST  
Role Performed First   
Assistant First   
Assistant Scrub   
Personnel  
Time In 23   
11:14:00 23   
11:14:00 23   
11:14:00  
Time Out 23   
13:25:00 23   
13:25:00 23   
13:25:00  
Procedure Arthroplasty   
Shoulder Arthroplasty   
Shoulder Arthroplasty   
Shoulder  
Total Reverse(Left)   
Last Modified By:   
Lexi Rothman RN, Diane RN Kokinda, Diane RN  
23 13:25:19   
General Comments:  
DEANA CANTU AND [facility]KE [facility]VER -[facility]  
Surgical Procedures   
MAGR  
Pre-Care Text:  
A.20 Verifies   
operative procedure,   
surgical site, and   
laterality Im.150   
Develops   
individualized plan of   
care  
Entry 1  
Procedure Arthroplasty   
Shoulder Primary   
Procedure Yes  
Total Reverse  
Primary Surgeon   
Jose Abraham   
Modifiers Left  
Errol DO  
Surgeon Comment LEFT   
REVERSE TOTAL Start   
23 11:52:00  
SHOULDER  
Stop 23 13:17:00   
Anesthesia Type   
General  
Surgical Service   
Orthopedics Wound   
Class Clean  
Technique Details  
Closure Technique   
Primary Entire   
procedure No  
was performed via  
laparoscope or  
robotic assistance  
Last Modified By:   
Lexi Rothman RN  
23 13:25:24  
Post-Care Text:  
O.730 The patient's   
care is consistent   
with the   
individualized   
perioperative plan of   
care  
General Case Data MAGR  
Pre-Care Text:  
A.350.1 Classifies   
surgical wound  
Entry 1  
Case Information  
OR MAGR OR 05 Case   
Level Level 5  
Wound Class Clean   
Specialty Orthopedics  
ASA Class 2  
Diagnosis  
Preop Diagnosis DJD   
Postop Same As Preop   
Yes  
Postop Diagnosis DJD  
Blunt or No Is the   
procedure No  
penetrating injury   
considered  
occured prior to   
Emergent/Urgent?  
the start of the  
procedure:  
Last Modified By:   
Lexi Rothman RN  
23 12:03:26  
Post-Care Text:  
O.760 Patient receives   
consistent and   
comparable care   
regardless of the   
setting  
Time Out MAGR  
Entry 1  
Time out date/time   
23 11:51:00 All   
team members Yes  
have introduced  
themselves by name  
and role  
Surgeon, Yes Surgeon   
reviews Yes  
anesthesia, nurse   
critical or  
confirm patient,   
unexpected steps,  
site, procedure   
operative duration,  
anticipated blood  
loss  
Anesthesia team Yes   
Nursing team Yes  
reviews any reviews   
sterility  
patient-specific   
(including  
concerns indicator   
results)  
and equipment  
issues/concerns  
Antibiotic  
Antibiotic Yes   
Administration Time   
11:13  
prophylaxis given  
within the last 60  
minutes  
Last Modified By:   
Lexi Rothman RN  
23 12:03:58  
Patient Positioning   
MAGR  
Pre-Care Text:  
A.280 Identifies   
baseline   
musculoskeletal status   
Im.40 Positions the   
patient Im.80 Applies   
safety devices  
Entry 1  
Procedure Arthroplasty   
Shoulder Body Position   
Beach Chair  
Total Reverse(Left)  
Left Arm Position   
Resting at Side Right   
Arm Position Resting   
at Side  
Left Leg Position   
Other/see comments   
Right Leg Position   
Other/see comments  
Feet Uncrossed? Yes   
Press Points Checked   
Yes  
Additional PILLOWS   
USED UNDER Positioning   
Device Pillow, Safety   
Strap  
Information LEGS,   
PILLOW UNDER  
CALVES TO KEEP HEELS  
OFF THE MATRESS,  
NON-OPERATIVE ARM IN  
ARM SUPPORT  
Outcome Met (O.80) Yes  
Last Modified By:   
Lexi Rothman RN  
23 12:04:10  
Post-Care Text:  
E.290 Evaluates   
musculoskeletal status   
O.80 Patient is free   
from signs and   
symptoms of injury   
related to  
positioning  
Skin Prep MAGR  
Pre-Care Text:  
A.30 Verifies   
allergies Im.270   
Performs skin   
preparation Im.270.1   
Implements protective   
measures to prevent  
skin and tissue injury   
due to chemical   
sources  
Entry 1  
Skin Prep Syntegrity  
Prep Agents (Im.270)   
(more content not   
included)...        [facility]  
   
                                                    MAGR Intraoperative   
Record                                  MAGR Intra-Op Record   
Summary  
Primary Physician:  
Case Number:   
[id number]  
Finalized Date/Time:   
23 11:13:51  
Pt. Name: SHERRIE LOZADA CLEMENTINE  
/Sex: 1955   
FEMALE  
Med Rec #: [id number]  
Physician: Jose Abraham DO  
Financial #: [id number]  
Pt. Type: D  
Room/Bed: [facility]  
Admit/Disch: 23   
08:33:12 -  
Institution:  
Case Times MAGR  
Entry 1  
Patient  
In Room Time 23   
10:25:00 Out Room Time   
23 11:14:00  
Anesthesia  
Start Time 23   
10:26:00 Stop Time   
23 10:37:00  
Surgery  
Start Time 23   
10:30:00 Stop Time   
23 10:37:00  
Last Modified By:   
Karen Mathews RN  
23 11:13:42  
General Comments:  
nerve block to left   
shoulder  
Case Attendance MAGR  
Entry 1 Entry 2 Entry   
3  
Case Attendee Miguel Salgado MD, Lora RN Slauterbeck, Linda RN  
Role Performed   
Anesthesiologist of   
Circulator Circulator  
Record  
Time In 23   
10:25:00 23   
10:23:00 23   
10:24:00  
Time Out 23   
10:37:00 23   
10:40:00 23   
10:46:00  
Procedure Interscalene   
Block(Left)   
Interscalene   
Block(Left)   
Interscalene   
Block(Left)  
Last Modified By:   
Karen Mathews RN, Linda RN Slauterbeck, Linda RN  
23 10:44:44   
Surgical Procedures   
MAGR  
Pre-Care Text:  
A.20 Verifies   
operative procedure,   
surgical site, and   
laterality Im.150   
Develops   
individualized plan of   
care  
Entry 1  
Procedure Interscalene   
Block Primary   
Procedure Yes  
Primary Surgeon Miguel Salgado MD Modifiers   
Left  
Surgeon Comment   
SCALENE BLOCK LEFT   
Start 23   
10:30:00  
REVERSE TOTAL SHOULDER  
Stop 23 10:37:00   
Anesthesia Type   
Regional Block  
Surgical Service   
Anesthesia Wound Class   
Clean  
Technique Details  
Closure Technique N/A   
Entire procedure No  
was performed via  
laparoscope or  
robotic assistance  
Last Modified By:   
Karen Mathews RN  
23 10:45:22  
Post-Care Text:  
O.730 The patient's   
care is consistent   
with the   
individualized   
perioperative plan of   
care  
General Case Data MAGR  
Pre-Care Text:  
A.350.1 Classifies   
surgical wound  
Entry 1  
Case Information  
OR MAGR Proc Room Case   
Level None  
Wound Class Clean   
Specialty Anesthesia  
ASA Class 2  
Diagnosis  
Preop Diagnosis   
SCALENE BLOCK PRIOR TO   
Postop Same As Preop   
Yes  
LEFT REVERSE TOTAL  
SHOULDER  
Postop Diagnosis   
SCALENE BLOCK PRIOR TO  
LEFT REVERSE TOTAL  
SHOULDER  
Blunt or No Is the   
procedure No  
penetrating injury   
considered  
occured prior to   
Emergent/Urgent?  
the start of the  
procedure:  
Last Modified By:   
Karen Mathews RN  
23 10:46:10  
Post-Care Text:  
O.760 Patient receives   
consistent and   
comparable care   
regardless of the   
setting  
Time Out MAGR  
Entry 1  
Time out date/time   
23 10:26:00 All   
team members Yes  
have introduced  
themselves by name  
and role  
Surgeon, Yes Surgeon   
reviews Yes  
anesthesia, nurse   
critical or  
confirm patient,   
unexpected steps,  
site, procedure   
operative duration,  
anticipated blood  
loss  
Anesthesia team Yes   
Nursing team Yes  
reviews any reviews   
sterility  
patient-specific   
(including  
concerns indicator   
results)  
and equipment  
issues/concerns  
Antibiotic  
Antibiotic N/A  
prophylaxis given  
within the last 60  
minutes  
Is essential Yes  
imaging displayed?  
Last Modified By:   
Karen Mathews RN  
23 10:46:47  
Patient Positioning   
MAGR  
Pre-Care Text:  
A.280 Identifies   
baseline   
musculoskeletal status   
Im.40 Positions the   
patient Im.80 Applies   
safety devices  
Entry 1  
Procedure Interscalene   
Block(Left) Body   
Position Supine  
Left Arm Position   
Resting at Side Right   
Arm Position Resting   
at Side  
Left Leg Position   
Extended Right Leg   
Position Extended  
Feet Uncrossed? Yes   
Press Points Checked   
Yes  
Outcome Met (O.80) Yes  
Last Modified By:   
Karen Mathews RN  
23 10:47:14  
Post-Care Text:  
E.290 Evaluates   
musculoskeletal status   
O.80 Patient is free   
from signs and   
symptoms of injury   
related to  
positioning  
Skin Prep MAGR  
Pre-Care Text:  
A.30 Verifies   
allergies Im.270   
Performs skin   
preparation Im.270.1   
Implements protective   
measures to prevent  
skin and tissue injury   
due to chemical   
sources  
Entry 1  
Skin Prep Syntegrity  
Prep Agents (Im.270)   
Chlorhexidine   
Gluconate Prep By   
Miguel Salgado MD  
and Alcohol  
Prep Area (Im.270)   
Shoulder, Neck Prep   
Area Details Left  
Skin Prep Agent Dry   
Yes  
Without Pooling  
Hair Removal  
Syntegrity  
Hair Removal Methods   
No hair removal  
performed  
Outcome Met (O.100)   
Yes  
Last Modified By:   
Karen Mathews RN  
23 10:48:06  
Post-Care Text:  
E.10 Evaluates for   
signs and symptoms of   
physical injury to   
skin and tissue O.100   
Patient is free from   
signs  
and symptoms of   
chemical injury  
Departure from OR MAGR  
Entry 1  
Present on Depart   
Oxygen Via Stretcher  
Post-op Destination   
[place]  
Skin DFO  
Condition Intact  
Description  
Condition Warm  
Description  
Report Given To   
Lexi Rothman RN  
Airway Maintenance  
Patient Status Stable   
Oxygen in Use? Yes  
Airway D (more content   
not included)...    Normal                                  [facility]   
3   
   
                                        MAGR PACU Record    MAGR PACU Record   
Summary  
Primary Physician:   
Jose Abraham DO  
Case Number:   
[id number]  
Finalized Date/Time:   
23 14:30:30  
Pt. Name: SHERRIE LOZADA/Sex: 1955   
FEMALE  
Med Rec #: [id number]  
Physician: Jose Abraham DO  
Financial #: [id number]  
Pt. Type: D  
Room/Bed: [facility]/  
Admit/Disch: 23   
08:33:12 -  
Institution:  
PACU Case Times MAGR  
Entry 1  
In PACU I 23   
13:25:00 Discharge   
from PACU 23   
14:14:00  
I  
Last Modified By:   
Reanna Silver RN  
23 14:30:27  
Finalized By: Reanna Silver RN  
Document Signatures  
Signed By:  
Reanna Silver RN   
23 14:30      [facility]  
   
                                                    MAGR Preoperative Recordon 0  
2023   
   
                                                    MAGR Preoperative   
Record                                  MAGR Pre-Op Record   
Summary  
Primary Physician:   
Jose Abraham DO  
Case Number:   
[id number]  
Finalized Date/Time:   
23 14:30:46  
Pt. Name: SHERRIE LOZADAN  
/Sex: 1955   
FEMALE  
Med Rec #: [id number]  
Physician: Jose Abraham DO  
Financial #: [id number]  
Pt. Type: D  
Room/Bed: [facility]  
Admit/Disch: 23   
08:33:12 -  
Institution:  
Pre-Op Case Times MAGR  
Pre-Care Text:  
Patient will be   
optimally prepared for   
surgery. Patient is   
free from s/s of   
injury. Provide   
information to  
patient/family related   
to plan of care.   
Verify patient   
allergies. Confirm   
identity and verify   
consent before  
the operative or   
invasive procedure.  
Entry 1  
Patient Arrival Time   
23 08:50:00   
Preop Departure   
23 11:13:00  
Last Modified By:   
Reanna Silver RN  
23 14:30:43  
Post-Care Text:  
Patient is prepared   
mentally and   
physically and is   
ready for surgery. The   
patient remains free   
from s/s of  
injury. Patient/family   
express understanding   
of plan of care and   
participate in   
decisions affecting   
his or her  
perioperrative plan of   
care. Allergies   
documented   
appropriately. Patient   
identifiers and   
consent correct.  
General Comments:  
pt arrives to [facility]   
ambulatory. denies   
CP,cough,cold, COVID   
like symptoms. pt has   
diabetes, denies  
pacemaker/defib, sleep   
apnea. pt verbalizes   
understanding of post   
op anesthesia orders   
including no driving   
for  
24 hours.  
Finalized By: Reanna Silver RN  
Document Signatures  
Signed By:  
Reanna Silver RN   
23 14:30      [facility]  
   
                                                    Nutrition Noteon 2023   
   
                                        Nutrition Note      Pt admitted for   
scheduled Lt shoulder   
surgery. Currently   
NPO. Pre-op wt 93.4kg   
w/ no recent previous   
wts on file.    
pre-op labs reviewed,   
Na 135L. Pt w/ DM,   
this am BS at   
113mg/dl. Per med hx,   
Pt does take several   
OTC   
vitamins/minerals/supp  
lements. No   
chewing/swallowing   
problems identified.   
Once diet advanced,   
recommend 1800CD DM   
for carb consistency.   
Other than age at 67y   
w/ surgery, Pt appears   
at low nutrition risk.   
Will monitor wts,   
intake and labs.    Normal                                  [facility]  
   
                                                    Operative Report - Surgeon/P  
hugh 2023   
   
                                                    Operative Report -   
Surgeon/Physician                       Preoperative   
diagnosis: Primary   
osteoarthritis left   
shoulder  
Postoperative   
diagnosis: Same  
Procedure: Reverse   
total shoulder   
replacement left  
Surgeon: LOPEZ Abraham D.O.  
Anesthesia: General  
Indications for   
surgery: Radiographic   
findings consistent   
with arthritis   
progressive pain   
symptoms with loss of   
function and failure   
of conservative   
treatment  
Estimated blood loss:   
100  
Complications: No   
complications  
Findings: Bone-on-bone   
and flattening of the   
humeral head and   
inferior humeral head   
osteophyte with   
absence of articular   
cartilage in the   
glenoid and humeral   
head  
Procedure summary:   
After administration   
of general anesthesia   
the patient was placed   
in the beachchair   
position the left   
shoulder was prepped   
with isopropyl alcohol   
and then with   
Betadine. The patient   
was draped out in the   
usual fashion and a   
timeout was taken. An   
anterior incision with   
the anterior   
deltopectoral approach   
was utilized. The   
cephalic vein was   
identified and it was   
mobilized laterally   
with the deltoid. The   
pectoralis and   
conjoined tendon and   
biceps tendon were all   
identified. The   
pectoralis was   
released about a   
centimeter and then   
the biceps was   
transected at the   
level of the   
pectoralis. The   
capsule and what   
remained of the   
subscapularis were   
reflected medially and   
the shoulder was   
dislocated there was   
gross deformity of the   
humeral head with   
advanced arthritic   
changes. Utilizing a   
20 degree version   
guide a cut was taken.   
The humerus was opened   
with a hand-held   
reamer and then   
broaching sequentially   
up to size 7. The size   
7 broach was left in   
place and my attention   
was turned towards the   
glenoid. What remained   
of the labrum was   
excised the biceps   
stump was excised. A   
24 mm baseplate guide   
was used to inserted a   
Steinmann pin. Reaming   
was performed over   
this to prepare the   
glenoid surface and   
then reaming was   
performed for the   
central post to 25 mm.  
2 mm offset 24   
baseplate with a 25 mm   
post was impacted into   
place it was snug and   
secure. It was   
stabilized with 2   
nonlocking screws and   
2 locking screws. Next   
a 36+4 glenosphere was   
impacted into place.   
This was further   
stabilized with a   
locking screw   
centrally that was   
torqued between 4 and   
5.  
The proximal humerus   
was prepared with the   
reamer and then a   
neutral cup was   
clicked into place for   
trial reductions with   
a +3 mm poly. The   
shoulder was stable   
the deltoid was   
tensioned with good   
range of motion. The   
trial components were   
removed and the broach   
was removed and a size   
7 apex stem with a   
neutral cup was   
assembled on the back   
table and then   
implanted into the   
patient. Next 3 mm x   
36 liner was clicked   
into place and the   
shoulder was reduced.   
The shoulder was taken   
through range of   
motion it was stable.  
There was no tendency   
towards dislocation   
there was good range   
of motion and the   
deltoid was tensioned   
appropriately. The   
joint was soaked in   
diluted Betadine and   
then dried. The   
subcutaneous fascia   
was closed with a 2-0   
Vicryl then a running   
2-0 Vicryl stitch and   
then a running 3-0   
Vicryl subcuticular   
closure followed by   
tincture benzoin and   
Steri-Strips. Sterile   
dressings were   
applied. The patient   
tolerated the surgery   
without complications.  
[Electronically Signed   
on: 2023 13:39   
EDT]  
______________________  
__________________  
Jose Abraham DO [Verified   
on: 2023 13:39   
EDT]  
______________________  
__________________  
Jose Abraham DO           Normal                                  [facility]  
   
                                                    POCT Glucose Levelon   
023   
   
                      Glucose [Mass/Vol] 108 mg/dL  Normal          [facility]  
   
                                        Comment on above:   Performed By: #### 4  
[id number] ####[facility]   
(DEFAULT)[address] [place] [id number]   
   
                      Glucose [Mass/Vol] 113 mg/dL  Normal          [facility] [facility]  
   
                                        Comment on above:   Performed By: #### 4  
[id number] ####[facility]   
(DEFAULT)[address]   
[place] [address]   
   
                                                    Patient Handouton 2023  
   
   
                                        Patient Handout     DR. MANDEL POST  
   
OPERATIVE SHOULDER   
INSTRUCTIONS  
SURGEONS WRITTEN   
INSTRUTCTIONS:  
1. If you have been   
given a cryo cuff   
after surgery you   
should use it as much   
as possible for the   
first 24-48 hours.   
After that it is   
optional. TIP: Many   
patients prefer to use   
it a little longer   
because it helps   
reduce pain  
2. You should wiggle   
your fingers   
frequently  
3. Change your   
dressings in 1 day. If   
steri-strips have been   
applied DO NOT remove   
them. When the wound   
is clean and dry you   
may leave it open to   
air but again DO NOT   
remove any   
steri-strips that have   
been applied  
4. You may shower in 1   
day but do not let the   
water stream directly   
strike the wound  
5. Do pendulum   
exercises for at least   
10 minutes twice a day  
6. If you have any   
problems or concerns,   
please call the office   
at [phone]  
7. Follow up as   
scheduled           [facility]  
   
                                                    Progress Note - Nurseon    
   
                                                    Progress Note -   
Nurse                                   Patient arrives via   
bed to room from   
recovery. Drowsy but   
easily aroused. Vital   
signs per iview.   
Oxygen sat 88% while   
patient dozing so   
oxygen applied at 2   
liters per NC until   
more awake. Daughter   
at bedside. Denies   
pain.  
[Electronically Signed   
on: 2023 15:56   
EDT]  
______________________  
__________________  
Mary Godinez RN   
[Verified on:   
2023 15:56 EDT]  
______________________  
__________________  
Mary Godinez RN     [facility]  
   
                                                    Progress Note - Nurseon    
   
                                                    Progress Note -   
Nurse                                   Pre op phone call,   
spoke with patient.   
Confirmed time of   
arrival for 0600 on   
23. Reviewed pre   
op instructions.   
Patient questioned if   
we got the ECHO   
results? States that   
her family doctor Dr. Veliz ([place] Mott) had   
ordered it for   
clearance for the   
upcoming surgery.   
Informed I was not   
aware of the ECHO   
being done but would   
look into it. Informed   
that she would only   
hear back if something   
was abnormal with the   
ECHO. Verbalized   
understanding.  
[Electronically Signed   
on: 09/15/2023 09:30   
EDT]  
______________________  
__________________  
Tanisha Harrison RN   
[Verified on:   
09/15/2023 09:30 EDT]  
______________________  
__________________  
Tanisha Harrison RN   
Call made to Dr. Abraham's office,   
spoke with Loren.   
Informed of the above.   
States she will have   
Pia call me back.  
[Electronically Signed   
on: 09/15/2023 09:34   
EDT]  
______________________  
__________________  
Tanisha Harrison RN   
Pia from Dr. Abraham's office   
called. Stated they   
are waiting for the   
ECHO to be read and   
will hopefully have   
the clearance letter   
today or first thing   
Monday. Will call and   
inform patient that   
they will be coming in   
at 0830 instead of   
0600.  
[Electronically Signed   
on: 09/15/2023 10:08   
EDT]  
______________________  
__________________  
Tanisha Harrison RN [facility]  
   
                                                    C Urineon 2023   
   
                                        C Urine             Urine Culture ordere  
d   
as a result of   
parameters set on   
specific urine dip and   
urine microsopic   
results.  
30,000 cfu/ml   
Klebsiella pneumoniae  
ORGANISM  
Klepne  
----------------------  
--- SUSCEPTIBILITY   
----------------------  
--  
ORGANISM ID: 1  
ANTIBIOTIC   
INTERPRETATION KELSIE   
STATUS  
ORGANISM KlepneKlepne  
Amik S <=16 Verified  
Amox/Cla S <=8/4   
Verified  
Amp R >16 Verified  
Amp/Sul S <=8/4   
Verified  
Azt S 8 Verified  
Cefaz S <=2 Verified  
Cefep S <=8 Verified  
Cefo S <=2 Verified  
Ceftaz S <=1 Verified  
Ceftri S <=1 Verified  
Cefur S <=4 Verified  
Ceph S <=8 Verified  
Cipro S <=1 Verified  
Ertap S <=0.5 Verified  
Gent S <=2 Verified  
Imi S <=1 Verified  
Levo S <=2 Verified  
Nitro S <=32 Verified  
Pip/Davion S <=16   
Verified  
Tetra S <=4 Verified  
Tobra S <=4 Verified  
Tri/Sulf S <=2/38   
Verified            Normal                                  [facility]  
   
                                        Comment on above:   Performed By: #### 1  
[id number] ####  
[facility] (DEFAULT)  
[address] [place] [address]   
   
                                                    Coding Summaryon 2023   
   
                                        Coding Summary      [facility]   
OyqpatsfSOi0xYp+PGhlYW  
Q+XH0LBOSbH33erUXkgP7q  
O9DGKCwGZimdGDYGIXeNUn  
ScwpCoPV7vvVEtJHAw  
IC8+WQ2wIVIiUmugaZBsy6  
H1mNJ0O91mon5vSZbyeOJ8  
CCXeMpQnnzwny9fdtUo4YL  
cuNmluOyBt  
IHScdJ53UGY3eK71Vj44uU  
MsrRSnk6jvpHz8HzMqWPCl  
VJX1mYwxSBqea7IbXDTjR8  
6teXZki5M5  
ARCdtSrvrRSbPwSgoHT3hI  
7wFXydbpckl8zzvospBkt5  
rm78sDOmk7X3yUF9W9Lkpa  
B1EMGfgJGa  
XtdfyULByZ2rnbcvw0xjxv  
jiXhFpXPYrEMw8UHp6DOOv  
qNduIeBtPU24EIY4AFKqdz  
IvY8NbVCKj  
uHfvRlW8j8S6Hh1YR0TAZk  
kgA2WOEFFGICikdEQ+PC90  
kt00O4PlSzkxZgt3VEJyIF  
I5aSU0aG2k  
HMMbTPjfu3U5kDK5H3Jcly  
Yidt3rp0yuDKTaHDosN37a  
qCIth5H0QVGmsXH3VOZukH  
okThRaoV39  
Oyc+IZOinHjfd4NzTkfho0  
atk0lhbMz3AjnsTXKvzlZr  
uWayMHK2l7ZbJs7zTFCebC  
Q5uBN0oH4m  
ZrJnOnL6QYykT638DwJijP  
GgDvywK73qM5KyuPY+PHRy  
Ncy9CKGtrYeoKC0fJ7KrEH  
RpbmctbGVm  
mNkfJJ2bXRNajjxwIPVicF  
5hSPSxF8i7GeUvGrR8NNoz  
P5NhFFDmfvjpAc89cT7lYo  
PdLlJ2KIog  
T3WpfiL7UHZkaRSwCWqbJG  
F4P75pa0M8LMWxKHUjVVD5  
eOY8nA3mkWkpbwxveKFewP  
sgdmVydGlj  
AGmtLWsoI415ERLpaMwnYb  
NvZGluZyBEYXRlOiAgMDgv  
MjUvMjAyMzwvdGQ+PHRkIH  
Y8zTzlSPOa  
bBMnGEhwRz5vlGlscLigWZ  
5lTXVtdqlpHVKurE5zXVZr  
dMPgkUnxJL8eSFRmsglhv8  
98VlPpQPM0  
REIltMJwR1CzbB0wIcAtVX  
ExRQYnK1PajZPzXLhkC370  
XImxFwX3VUWpwmIcU9ZzJU  
FsaWduOiB0  
n8O9Id4Eh5QdrekuC9QtiM  
LdRlSdMbdvYRt3W1RzKdmb  
dHI+NE10URBwSE16LTk9HD  
S8gQkeHRak  
YXSnR7KjxQ5sZzHxHUSaDE  
RkOyc+PHRhYmxlIHdpZHRo  
YYvpMHBtKcXymMscVL3hMu  
9yZGVyLWNv  
uNpqrAMjKoUfb3cfLPPwUY  
wzVU1eiEbmM7ZrzCJ2IOSg  
t4o2Id32X16cA5NmxEM+PG  
ZqaEF5tIM2  
jD9pMnMlVtU4MWvuX550Fx  
VegNHxBijwu6yfd0xdkHm1  
DgJ6OECtapXbnBszFFJ8x0  
QoZo76H71d  
IHdpZHRoPSIxNSUiIHZhbG  
kfou7lrV3lEp7+PGNvbCB3  
kPP0rN1dCtOtTrZ4FVbmI6  
49InRvcCIv  
Wqwtd3qvq1pzdZl6NxSmDM  
QufkLuwLelZTL0h1FvVk40  
Z0RfkSsnn1BfOeg8fa07nH  
Dfr5O2oNH8  
J8OyMMPtnonvzGItzFtoAM  
0vLHDjkwkwMDDywQ6kWCJg  
F6m7XaGqGbO0RWmzN4Bdbo  
C1NFEwePUj  
CMWbnAKBnI8yrnwld2iifp  
rsOfQlULTjRDn5DZc5QUHi  
zQebJdCkGCU0XyI1HXY5cR  
OnuH1ptIzj  
bhtdqG7rHpr+WEJ2xZGoxQ  
RILE9dYpgpkTW+PHRkIHN0  
eXegVKkgWHZmuB4dIEQnF6  
m9BaVwQqX2  
FCksU3AjwjX2INYafDOgCI  
TndUKJrC9hlonii0sjirie  
IhMnKRSgEFr6TEm6XEXqxB  
duOiBsZWZ0  
KeC8OJC9gNCikO9zpIyfiz  
oolM3qBrf+QmlydGggRGF0  
WAz9U2DfShz5EWGanVusTY  
0ncGFkZGlu  
Ac8pdCyxfQjxDN0vGRRhfc  
rvh719CoSqc5vkOFFenCJs  
OBhuFAU7Z80os8U8LURcFP  
DkNET3jSU2  
bL0nxReuoffrxXWpzBwsqd  
OsgZxgLZmsLWktR039UUUx  
xCrlJlNeJUi8N5LtWev6UB  
VtjRnvBN8z  
pWGnYYqjVp1lmPdsiUsoIO  
4iOEZwtbuvl357QpRoc0mp  
PVAqdOGxNDlxPTH8F96ar8  
C3GFUnBCYf  
QFT1nGP7aU5eqVmqpozjzC  
VmdDsgdmVydGljYWwtYWxp  
Z958RTShuZddVjOrhJn9R1  
WwSdz8ZBWn  
iUqxSC1qgXKrJXzwZa7oxP  
ptiBuzJS5zECUbksmol984  
WfBko1yuTOJgoWFlIJqxIM  
H7M34nh7R5  
COVrNOXuZNS8eVK1xX4obN  
lnbjogbGVmdDsgdmVydGlj  
RCfpBGkyZ900VBHalObeDe  
BhdGllbnQg  
FUwsWRb7U7VhMnmiaQK+PC  
18WYZnEU93uFQljEIjs8rv  
aXu0YgDoOXFuNIY8pIylHD  
tsr4IvGNCx  
L60ddTQli2C3RIZogBuhtG  
EoPeMvwFT1hB1rVSqgcprt  
w0vmhjhhOwwmz7cpgj08dC  
43S49lVVvb  
ZHRoPSIzMCUiIHZhbGlnbj  
0tqS9vIr2+STNmgDY6vKC0  
uD4sJSXbVgT4OMesB375Kb  
RvcCIvPjxj  
n2qmn1aajOy5QmA0WXYaqo  
AedVtqADI0s2PjNg92W48b  
IHdpZHRoPSIyMCUiIHZhbG  
ayrh8zyM8k  
Ii8+VZAtiIQ0gYP8fH5pOy  
NqMgR8KAkiD681OgQtrOWp  
OkcmK55tS6IswXS+PHRyPj  
k2YZRxlMge  
EZ9drAOnGUpzKh7gPID8Yu  
SwGoMfAVzdX5SjJHZjkwlh  
jicttUW7ETHxRSOnkW00Fe  
9udDogMTBw  
gKLJeL9mvzrde1hnshlpEf  
DuDUChKXy9IPx1OVQfeBab  
RgGhDMM6FlC3SEG3oELbuZ  
1hbGlnbjog  
lD2tC2IoFNCgegsgKa12kL  
1eMrBhXpE5SQqcVca+Sk9O  
MGFlLNNETl9XRGxjRWcZMi  
wvdGQ+PHRk  
PZR5sWluTYguJYVlbM4eDU  
RiO0i8RzTgFjX2RKcrZ1Nc  
XJXgmxorVv59jC0hMwCmHo  
X2HYpmW1Sn  
xbA0KFLuuFJyFZywJTG7P2  
7nh9L8QKPpTUEzYYB7nWT0  
lM4zeCrndomzrPLqzGpzgr  
VydGljYWwt  
ZWmmI184USUneUvuYxYbPj  
X5NlN1GHn7B3VgCrk9AGGn  
dYxpID5ctKMcZZtiBy0koI  
pelAuuDJ1z  
LWEdktygNNVzgF0bABNceF  
GfaWdkTD3sFFEomizzz769  
JiQsAKB1KNNniBYfQ1PolH  
9yOiAjMDAw  
RENlT7YgpWDyUYihS190KY  
pxPpQ8ZZDzhmNxP9UhAVUh  
lSipGkC2h5G3Gr58NXBOSN  
FyczwvdGQ+  
MREaIAC1rPjuMYutADJxtJ  
4jYFVlS4d3DdEvTjQ7DOck  
S3BhFTUvpdszUx67nK1aFr  
AnZaJ7MDet  
J9ZauvB1TDKhrOZiOUycUO  
Z1Q37nd1I5TSArTQVrPFF7  
sRC2uD4jiSgwxssmxYQruV  
sgdmVydGlj  
NKahEUdkV818KEDrbLnqIn  
ZFTUFMRTwvdGQ+PHRkIHN0  
mKlfMVasNFUcgW0yUVBoI5  
o6FaOvOuO9  
IDptN1VlQDWtfssiPw46gV  
4oIcMnAvN5VJobG7HbhbL7  
LNKrpTQfUFltGHH4W46rv6  
C3SZYyMFVi  
DDX7sAN1pF3ukIszeasypE  
VmdDsgdmVydGljYWwtYWxp  
Y406CPEetCzuEw5IQL92SH  
15O5RzJplw  
dGFibGU+PHRhYmxlIHdpZH  
UmLImxPMWxLtQohDdvSX7q  
Ej2nIREeZUMlhEeulASbIg  
Yae6lqYJHc  
BZfgSK3icPvkO5GmzOO6CO  
Xjp2b7Bn29L65lW6JfdZJ+  
IWWmsTL1dWI1jQ3qSaJzZk  
W0COjaI549  
HiMudAMdLhiby9lhp8vllS  
x5DqPtIFByloGqwGaiUAG5  
k4WbXb93M00rWIwvDRPkQM  
IyMCUiIHZh  
oShbys2krY4yCo0+PGNvbC  
O1nXD8pT3sWtTxMgB7NPna  
U465KgIvhKQuVireW58tX5  
JvdXA+PHRy  
Qyo6FIYqfRpjOJ8hnDDhGD  
ttKh0cSBY7JfNdVoXxBXnk  
N2PpEOVonektwcahbUC2YI  
WeAUNvfY69  
Lo3xuUzkCy1lTBHdZEJ1WQ  
MhyDYeC5PsoJ4uKcRtCNGx  
BAIaB0KkaNZmWDgkP644OE  
voLdT5HOWz  
dsYgQ5PyIWCnjBsaRgA0v9  
H1Rn0FwVdemKQxRG7qGzCh  
KPq9X3KbOqr5RRNzlSubSH  
0ncGFkZGlu  
Es5qyOufrFbtJF4vYRLfis  
enr285IhMtq3bpRUQdoWYr  
VLpdEZI1Y40gg5R6CDEvYR  
NzWOA5sSS7  
gR7xuEjjqbfjsDQuwCfkkx  
ZhnPipUAhcSKloD262MBNd  
cNdfOzHWGss0D9ZsXhk6BB  
EepVdcPN2i  
lMKjGKzfVl6huXgvhBmvSL  
8xJVPldatba975PcNkd3cu  
GLJweOAvWQkqPWL6Q74xh2  
V3FFPwKEYk  
EGC9pME9aV6aaQdwbebqlR  
VmdDsgdmVydGljYWwtYWxp  
Q312MDGupNahVf5WJuy7C8  
CrNbl8NBEp  
uRlaBE5wmWAjKNnoQk5hnT  
kmtUipTE4bIYBefkhiy682  
EmTyk3fuSZTpvTQlFUfmHB  
T4I88rg8P4  
DNDjEMCpAPY6uZE2vH9axM  
lnbjogbGVmdDsgdmVydGlj  
KUuhZCyqN691GLPycYvkHp  
BheWVyOjwv  
dGQ+AH26vd75G8GcBblfZy  
y2NPObWVF9xTR4bV0wLKKg  
GYlgv6C4dUK5G2JcocCvyt  
3hs9gpUWJu  
ZTo (more content not   
included)...        [facility]  
   
                                                    C MRSA Screenon 2023   
   
                      C MRSA Screen Negative   [facility]  
   
                                        Comment on above:   Performed By: #### 1  
[id number] ####[facility] (DEFAULT)[address] [place] [address]   
   
                                                    Provider Orderson 2023  
   
   
                                        Provider Orders     [id number]  
52  
[id number]  
TGTIFF              [facility]  
   
                                                    .Auto Diff 12023   
   
                      Auto Mono % 10 %       Normal     12       [facility]  
   
                                        Comment on above:   Performed By: #### 1  
[id number], [id number] ####[facility] (DEFAULT)[address] [place] [address]   
   
                      Baso Abs#  0.1 x10    Normal     0.0-0.2    [facility]  
   
                                        Comment on above:   Performed By: #### 1  
[id number], [id number], [id number] ####[facility] (DEFAULT)[address] [place] [address]   
   
                                                    Basophils/100 WBC   
(Bld)           0.5 %           Normal          0.2-2.0         [facility]  
   
                                        Comment on above:   Performed By: #### 1  
[id number], [id number], [id number] ####[facility] (DEFAULT)[address] [place] [address]   
   
                      Eos Abs#   0.1 x10    Normal     0.0-0.4    [facility]  
   
                                        Comment on above:   Performed By: #### 1  
[id number], [id number] ####[facility] (DEFAULT)[address] [place] [address]   
   
                                                    Eosinophils/100 WBC   
(Bld)           1.1 %           Normal          0.9-4.0         [facility]  
   
                                        Comment on above:   Performed By: #### 1  
[id number], [id number], [id number] ####[facility] (DEFAULT)[address] [place] [address]   
   
                      Lymph Abs# 2.1 x10    Normal     1.3-2.9    [facility]  
   
                                        Comment on above:   Performed By: #### 1  
[id number], [id number], [id number] ####[facility] (DEFAULT)[address] [place] [address]   
   
                                                    Lymphocytes/100 WBC   
(Bld)           18 %            Normal          14-48           [facility]  
   
                                        Comment on above:   Performed By: #### 1  
[id number], [id number], [id number] ####[facility] (DEFAULT)[address] [place] [address]   
   
                      Mono Abs#  1.2 x10    High       0.0-0.8    [facility]  
   
                                        Comment on above:   Performed By: #### 1  
[id number], [id number], [id number] ####[facility] (DEFAULT)[address] [place] [address]   
   
                      Neut Abs#  8.6 x10    Normal     1.5-9.2    [facility]  
   
                                        Comment on above:   Performed By: #### 1  
[id number], [id number], [id number] ####[facility] (DEFAULT)[address] [place] [address]   
   
                                                    Neutrophils/100 WBC   
(Bld)           71 %            Normal          44-88           [facility]  
   
                                        Comment on above:   Performed By: #### 1  
[id number], [id number], [id number] ####[facility] (DEFAULT)[address] [place] [address]   
   
                                                    BMP Standardon 2023   
   
                                eGFR Non AA     49 mL/min/1.73m2 Invalid   
Interpretation   
Code                                                [facility]  
   
                                        Comment on above:   Performed By: #### 1  
[id number], [id number], [id number] ####[facility] (DEFAULT)[address] [place] [address]   
   
                                eGFR AA         59 mL/min/1.73m2 Invalid   
Interpretation   
Code                                                [facility]  
   
                                        Comment on above:   Performed By: #### 1  
[id number], [id number], [id number] ####[facility] (DEFAULT)[address] [place] [address]   
   
                                                    Anion gap   
[Moles/Vol]     12.9 mmol/L     Normal          5.0-19.0        [facility]  
   
                                        Comment on above:   Performed By: #### 1  
[id number], [id number], [id number] ####[facility] (DEFAULT)[address] [place] [address]   
   
                      Calcium [Mass/Vol] 9.2 mg/dL  Normal     8.9-10.3   [facility]  
   
                                        Comment on above:   Performed By: #### 1  
[id number], [id number], [id number] ####[facility] (DEFAULT)[address] [place] [address]   
   
                                                    Chloride   
[Moles/Vol]     98 mmol/L       Low             101-111         [facility]  
   
                                        Comment on above:   Performed By: #### 1  
[id number], [id number], [id number] ####[facility] (DEFAULT)[address] [place] [address]   
   
                      CO2 [Moles/Vol] 28 mmol/L  Normal     21-32      [facility]  
   
                                        Comment on above:   Performed By: #### 1  
[id number], [id number], [id number] ####[facility] (DEFAULT)[address] [place] [address]   
   
                                                    Creatinine   
[Mass/Vol]      1.12 mg/dL      Normal          0.60-1.30       [facility]  
   
                                        Comment on above:   Performed By: #### 1  
[id number], [id number], [id number] ####[facility] (DEFAULT)[address] [place] [address]   
   
                      Glucose [Mass/Vol] 110.0 mg/dL Normal     74.0-118.0 [facility]  
   
                                        Comment on above:   Performed By: #### 1  
[id number], [id number], [id number] ####[facility] (DEFAULT)[address] [place] [address]   
   
                                Osmolality      273 mOsm/L      Invalid   
Interpretation   
Code                                                [facility]  
   
                                        Comment on above:   Performed By: #### 1  
[id number], [id number], [id number] ####[facility] (DEFAULT)[address] [place] [address]   
   
                                                    Potassium   
[Moles/Vol]     3.9 mmol/L      Normal          3.6-5.1         [facility]  
   
                                        Comment on above:   Performed By: #### 1  
[id number], [id number], [id number] ####[facility] (DEFAULT)[address] [place] [address]   
   
                      Sodium [Moles/Vol] 135.0 mmol/L Low        136.0-144.0 [facility]  
   
                                        Comment on above:   Performed By: #### 1  
[id number], [id number], [id number] ####[facility] (DEFAULT)[address] [place] [address]   
   
                                                    Urea nitrogen   
[Mass/Vol]      20 mg/dL        Normal          8-26            [facility]  
   
                                        Comment on above:   Performed By: #### 1  
[id number], [id number], [id number] ####[facility] (DEFAULT)[address] [place] [address]   
   
                                                    Urea   
nitrogen/Creatinine   
[Mass ratio]    17.8 mg/mg      High            4.6-16.2        [facility]  
   
                                        Comment on above:   Performed By: #### 1  
[id number], [id number], [id number] ####[facility] (DEFAULT)[address] [place] [address]   
   
                                                    CBC w/ Auto Diffon   
3   
   
                                                    Erythrocyte   
distribution width   
(RBC) [Ratio]   12.7 %          Normal          11.5-15.0       [facility]  
   
                                        Comment on above:   Performed By: #### 1  
[id number], [id number], [id number] ####[facility] (DEFAULT)[address] [place] [address]   
   
                                                    Hematocrit (Bld)   
[Volume fraction] 38.4 %          Normal          33.7-40.4       [facility]  
   
                                        Comment on above:   Performed By: #### 1  
[id number], [id number], [id number] ####[facility] (DEFAULT)[address] [place] [address]   
   
                                                    Hemoglobin (Bld)   
[Mass/Vol]      12.9 g/dL       Normal          11.3-15.9       [facility]  
   
                                        Comment on above:   Performed By: #### 1  
[id number], [id number], [id number] ####[facility] (DEFAULT)[address] [place] [address]   
   
                                Man Diff?       Auto            Invalid   
Interpretation   
Code                                                [facility]  
   
                                        Comment on above:   Performed By: #### 1  
[id number], [id number], [id number] ####[facility] (DEFAULT)[address] [place] [address]   
   
                                                    MCH (RBC) [Entitic   
mass]           31 pg           Normal          24-34           [facility]  
   
                                        Comment on above:   Performed By: #### 1  
[id number], [id number], [id number] ####[facility] (DEFAULT)[address] [place] [address]   
   
                                                    MCHC (RBC)   
[Mass/Vol]      34 g/dL         Normal          26-37           [facility]  
   
                                        Comment on above:   Performed By: #### 1  
[id number], [id number], [id number] ####[facility] (DEFAULT)[address] [place] [address]   
   
                                                    MCV (RBC) [Entitic   
vol]            92 fL           Normal                    [facility]  
   
                                        Comment on above:   Performed By: #### 1  
[id number], [id number], [id number] ####[facility] (DEFAULT)[address] [place] [address]   
   
                      Platelet   403 x10    Normal     138-427    [facility]  
   
                                        Comment on above:   Performed By: #### 1  
[id number], [id number], [id number] ####[facility] (DEFAULT)[address] [place] [address]   
   
                                                    Platelet mean   
volume (Bld)   
[Entitic vol]   8.4 fL          Normal          6.3-10.2        [facility]  
   
                                        Comment on above:   Performed By: #### 1  
[id number], [id number], [id number] ####[facility] (DEFAULT)[address] [place] [address]   
   
                      RBC        4.18 x10   Normal     3.70-5.30  [facility]  
   
                                        Comment on above:   Performed By: #### 1  
[id number], [id number], [id number] ####[facility] (DEFAULT)[address] [place] [address]   
   
                      WBC        12.1 x10   High       3.5-10.5   [facility]  
   
                                        Comment on above:   Performed By: #### 1  
[id number], [id number], [id number] ####[facility] (DEFAULT)[address] [place] [address]   
   
                                                    UA Lgemf3sa 2023   
   
                      UA Bacteria 1+         [facility]  
   
                                        Comment on above:   Order Comment: Urina  
lysis Microscopic order added on by   
Discern   
[employer]pert Rules system.   
   
                                                            Performed By: #### 1  
[id number], [id number], [id number] ####  
[facility] [facility] (DEFAULT)  
[address] [place] [address]   
   
                      UA Gran Cast 0-5        [facility]  
   
                                        Comment on above:   Order Comment: Urina  
lysis Microscopic order added on by   
Discern   
Expert Rules system.   
   
                                                            Performed By: #### 1  
[id number], [id number], [id number] ####  
[facility] (DEFAULT)  
[address] [place] [address]   
   
                      UA Hyal Cast 0-5        [facility]  
   
                                        Comment on above:   Order Comment: Urina  
lysis Microscopic order added on by   
Discern   
Expert Rules system.   
   
                                                            Performed By: #### 1  
[id number], [id number], [id number] ####  
[facility] (DEFAULT)  
[address] [place] [address]   
   
                      UA Mucous  1+         [facility]  
   
                                        Comment on above:   Order Comment: Urina  
lysis Microscopic order added on by   
Discern   
Expert Rules system.   
   
                                                            Performed By: #### 1  
[id number], [id number], [id number] ####  
[facility] (DEFAULT)  
[address] [place] [address]   
   
                      UA RBC     5-10       [facility]  
   
                                        Comment on above:   Order Comment: Urina  
lysis Microscopic order added on by   
Discern   
Expert Rules system.   
   
                                                            Performed By: #### 1  
[id number], [id number], [id number] ####  
[facility] (DEFAULT)  
[address] [place] [address]   
   
                      UA Squam Epi Few        [facility]  
   
                                        Comment on above:   Order Comment: Urina  
lysis Microscopic order added on by   
Discern   
Expert Rules system.   
   
                                                            Performed By: #### 1  
[id number], [id number], [id number] ####  
[facility] (DEFAULT)  
[address] [place] [address]   
   
                      UA WBC     60-70      [facility]  
   
                                        Comment on above:   Order Comment: Urina  
lysis Microscopic order added on by   
Discern   
Expert Rules system.   
   
                                                            Performed By: #### 1  
[id number], [id number], [id number] ####  
[facility] (DEFAULT)  
[address] [place] [address]   
   
                                                    UA w Culture if Ind Standard  
on 2023   
   
                      Breakpoint UA ********   [facility]  
   
                                        Comment on above:   Performed By: #### 1  
[id number], [id number], [id number] ####  
[facility] (DEFAULT)  
[address] [place] [address]   
   
                      Color (U)  Yellow     [facility]  
   
                                        Comment on above:   Performed By: #### 1  
[id number], [id number], [id number] ####  
[facility] (DEFAULT)  
[address] [place] [address]   
   
                                Culture?        Indicated       Invalid   
Interpretation   
Code                                                [facility]  
   
                                        Comment on above:   Result Comment: Resu  
lt created by rule GL_MAGR_ADD_UA_CULT   
Result   
created by rule GL_MAGR_ADD_UA_CULT Result created by rule   
GL_MAGR_ADD_UA_CULT1 Result created by rule GL_MAGR_ADD_UA_CULT   
   
                                                            Performed By: #### 1  
[id number], [id number], [id number] ####  
[facility] (DEFAULT)  
[address] [place] [address]   
   
                                                    Glucose (U)   
[Mass/Vol]      Negative        Normal                          [facility]  
   
                                        Comment on above:   Performed By: #### 1  
[id number], [id number], [id number] ####  
[facility] (DEFAULT)  
[address] [place] [address]   
   
                      Ketones Ql (U) Negative   Normal                [facility]  
   
                                        Comment on above:   Performed By: #### 1  
[id number], [id number], [id number] ####  
[facility] (DEFAULT)  
[address] [place] [address]   
   
                                Micro?          Indicated       Invalid   
Interpretation   
Code                                                [facility]  
   
                                        Comment on above:   Result Comment: Resu  
lt created by rule GL_MAGR_ADD_UA_MICRO   
   
                                                            Performed By: #### 1  
[id number], [id number], [id number] ####  
[facility] (DEFAULT)  
[address] [place] [address]   
   
                      UA Bilirubin Negative   Normal                [facility]  
   
                                        Comment on above:   Performed By: #### 1  
[id number], [id number], [id number] ####  
[facility] (DEFAULT)  
[address] [place] [address]   
   
                      UA Blood   TRACE      Abnormal   NEGATIVE   [facility]  
   
                                        Comment on above:   Performed By: #### 1  
[id number], [id number], [id number] ####  
[facility] (DEFAULT)  
[address] [place] [address]   
   
                      UA Clarity CLOUDY     Abnormal   CLEAR      [facility]  
   
                                        Comment on above:   Performed By: #### 1  
[id number], [id number], [id number] ####  
[facility] (DEFAULT)  
[address] [place] [address]   
   
                      UA Leuk Est LARGE      Abnormal   NEGATIVE   [facility]  
   
                                        Comment on above:   Performed By: #### 1  
[id number], [id number], [id number] ####  
[facility] (DEFAULT)  
[address] [place] [address]   
   
                      UA Nitrite Negative   Normal     NEGATIVE   [facility]  
   
                                        Comment on above:   Performed By: #### 1  
[id number], [id number], [id number] ####  
[facility] (DEFAULT)  
[address] [place] [address]   
   
                      UA pH      6.0        Normal     5-8        [facility]  
   
                                        Comment on above:   Performed By: #### 1  
[id number], [id number], [id number] ####  
[facility] (DEFAULT)  
[address] [place] [address]   
   
                      UA Protein Negative   Normal     NEGATIVE   [facility]  
   
                                        Comment on above:   Performed By: #### 1  
[id number], [id number], [id number] ####  
[facility] (DEFAULT)  
[address] [place] [address]   
   
                      UA Spec Grav 1.025      Normal     1.001-1.035 [facility]  
   
                                        Comment on above:   Performed By: #### 1  
[id number], [id number], [id number] ####  
[facility] (DEFAULT)  
[address] [place] [address]   
   
                      UA Urobilinogen 1.0 mg/dL  Normal     0.2-1.0    [facility]  
   
                                        Comment on above:   Performed By: #### 1  
[id number], [id number], [id number] ####  
[facility] (DEFAULT)  
[address] [place] [address]   
   
                      Urine Source Clean Catch Normal                [facility]  
   
                                        Comment on above:   Performed By: #### 1  
[id number], [id number], [id number] ####  
[facility] (DEFAULT)  
[address] [place] [address]   
   
                                                    CBC AUTO DIFFon 2023   
   
                      BASO #     0.0 103/ul Normal     0.0-0.1    [facility]  
   
                                        Comment on above:   Performed By: #### C  
BC ####  
[facility]  
[address] [place], [place] [address]  
Dr. Nikole Jacobs   
   
                                                    Basophils/100 WBC   
(Bld)           0.3 %           Normal          0.2-2.0         [facility]  
   
                                        Comment on above:   Performed By: #### C  
BC ####  
[facility]  
[address] [place] [address]  
Dr. Nikole Jacobs   
   
                      EO #       0.1 103/ul Normal     0.0-0.7    [facility]  
   
                                        Comment on above:   Performed By: #### C  
BC ####  
[facility]  
[address] [place] [address]  
Dr. Nikole Jacobs   
   
                                                    Eosinophils/100 WBC   
(Bld)           0.5 %           Critically low  0.9-7.0         [facility]  
   
                                        Comment on above:   Performed By: #### C  
BC ####  
[facility]  
[address] [place] [address]  
Dr. Nikole Jacobs   
   
                                                    Erythrocyte   
distribution width   
(RBC) [Ratio]   12.7 %          Normal          11.0-15.0       [facility]  
   
                                        Comment on above:   Performed By: #### C  
BC ####  
[facility]  
[address] [place] [address]  
Dr. Nikole Jacobs   
   
                                                    Hematocrit (Bld)   
[Volume fraction] 36.0 %          Normal          36.0-48.0       [facility]  
   
                                        Comment on above:   Performed By: #### C  
BC ####  
[facility]  
[address] [place] [address]  
Dr. Nikole Jacobs   
   
                                                    Hemoglobin (Bld)   
[Mass/Vol]      12.0 g/dL       Normal          12.0-16.0       [facility]  
   
                                        Comment on above:   Performed By: #### C  
BC ####  
[facility]  
[address] [place] [address]  
Dr. Nikole Jacobs   
   
                      IG #       0.05 10e3/ul Critically high 0.00-0.03  [facility]  
   
                                        Comment on above:   Performed By: #### C  
BC ####  
[facility]  
[address] [place] [address]  
Dr. Nikole Jacobs   
   
                      IG %       0.4 %      Normal     0.0-0.5    [facility]  
   
                                        Comment on above:   Performed By: #### C  
BC ####  
[facility]  
[address] [place] [address]  
Dr. Nikole Jacobs   
   
                      LYMPH #    1.5 103/ul Normal     1.2-3.8    [facility]  
   
                                        Comment on above:   Performed By: #### C  
BC ####  
[facility]  
[address] [address]  
Dr. Nikole Jacobs   
   
                                                    Lymphocytes/100 WBC   
(Bld)           11.8 %          Critically low  20.5-60.0       [facility]  
   
                                        Comment on above:   Performed By: #### C  
BC ####  
[facility]  
[address] [address]  
Dr. Nikole Jacobs   
   
                      MANUAL DIFF REQ NO         Normal                [facility]  
   
                                        Comment on above:   Performed By: #### C  
BC ####  
[facility]  
[address] [place] [address]  
Dr. Nikole Jacobs   
   
                                                    MCH (RBC) [Entitic   
mass]           31.7 pg         Normal          26.7-34.0       [facility]  
   
                                        Comment on above:   Performed By: #### C  
BC ####  
[facility]  
[address] [place] [address]  
Dr. Nikole Jacobs   
   
                                                    MCHC (RBC)   
[Mass/Vol]      33.3 g/dL       Normal          29.9-35.2       [facility]  
   
                                        Comment on above:   Performed By: #### C  
BC ####  
[facility]  
[address] [place] [address]  
Dr. Nikole Jacobs   
   
                                                    MCV (RBC) [Entitic   
vol]            95.2 fL         Normal          81.0-99.0       [facility]  
   
                                        Comment on above:   Performed By: #### C  
BC ####  
[facility]  
[address] [place] [address]  
Dr. Nikole Jacobs   
   
                      MONO #     1.1 103/ul Critically high 0.3-0.8    [facility]  
   
                                        Comment on above:   Performed By: #### C  
BC ####  
[facility]  
[address] [place] [address]  
Dr. Nikole Jacobs   
   
                                                    Monocytes/100 WBC   
(Bld)           8.2 %           Normal          1.7-12.0        [facility]  
   
                                        Comment on above:   Performed By: #### C  
BC ####  
[facility]  
[address] [place] [address]  
Dr. Nikole Jacobs   
   
                      NEUT #     10.2 103/ul Critically high 1.4-6.5    [facility]  
   
                                        Comment on above:   Performed By: #### C  
BC ####  
[facility]  
[address] [address]  
Dr. Nikole Jacobs   
   
                                                    Neutrophils/100 WBC   
(Bld)           78.8 %          Critically high 43.0-75.0       [facility]  
   
                                        Comment on above:   Performed By: #### C  
BC ####  
[facility]  
[address] [address]  
Dr. Nikole Jacobs   
   
                                                    Platelet mean   
volume (Bld)   
[Entitic vol]   9.3 fL          Critically low  9.5-13.5        [facility]  
   
                                        Comment on above:   Performed By: #### C  
BC ####  
[facility]  
[address] [address]  
Dr. Nikole Jacobs   
   
                      PLT        435 103/ul Normal     150-450    [facility]  
   
                                        Comment on above:   Performed By: #### C  
BC ####  
[facility]  
[address] [place] [address]  
Dr. Nikole Jacobs   
   
                      RBC        3.78 106/ul Critically low 4.20-5.40  [facility]  
   
                                        Comment on above:   Performed By: #### C  
BC ####  
[facility]  
[address] [address]  
Dr. Nikole Jacobs   
   
                      WBC        13.0 103/ul Critically high 4.0-11.0   [facility]  
   
                                        Comment on above:   Performed By: #### C  
BC ####  
[facility]  
[address] [place] [address]  
Dr. Nikole Jacobs   
   
                                                    CT HEAD WO CONon 2023   
   
                                        CT HEAD WO CON      EXAMINATION: CT HEAD  
   
WO CON  
HISTORY: Syncope  
COMPARISON: No   
relevant comparison   
available.  
TECHNIQUE: Axial CT   
images were obtained   
without IV contrast.   
Dose reduction  
techniques were   
achieved by using   
automated exposure   
control and/or   
adjustment  
of mA and/or kV   
according to patient   
size and/or use of   
iterative   
reconstruction  
technique.  
FINDINGS:  
BRAIN: No edema,   
hemorrhage, mass,   
acute infarction, or   
inappropriate atrophy.  
CSF SPACES: No   
hydrocephalus,   
subarachnoid   
hemorrhage, or mass.   
Appropriate for  
age.  
SKULL: No fracture,   
mass, or other   
significant visible   
lesion.  
SINUSES: No   
significant mucosal   
thickening or fluid on   
the limited views.  
ORBITS: No appreciable   
abnormality on the   
limited views.  
OTHER: Empty pituitary   
fossa.  
IMPRESSION:  
1. No intracranial   
hemorrhage. Normal CT   
appearance of brain.  
2. Empty pituitary   
fossa; pituitary   
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  
3. No fracture of the   
calvarium or scalp   
hematoma.  
Electronically   
authenticated by:   
ZACMIKEY SHERMAN Date:   
2023 15:30    Normal                                  [facility]  
   
                                                    CULTURE URINEon 2023   
   
                                        CULTURE URINE       Culture Observations  
:  
LIGHT GROWTH OF MIXED   
GENITAL ARMAND. NO   
POTENTIAL PATHOGENS   
SEEN.               Normal                                  [facility]  
   
                                        Comment on above:   Performed By: #### C  
MP ####  
[facility]  
[address] [address]  
Dr. Nikole Jacobs   
   
                                                    ER URINE PROFILEon   
3   
   
                      Bilirubin Ql (U) Negative   Normal     NEGATIVE   [facility]  
   
                                        Comment on above:   Performed By: #### U  
MICRO, ERUR ####  
[facility]  
[address] [address]  
Dr. Nikole Jacobs   
   
                      Clarity (U) CLEAR      Normal     CLEAR      [facility]  
   
                                        Comment on above:   Performed By: #### U  
MICRO, ERUR ####  
[facility]  
[address] [place] [address]  
Dr. Nikole Jacobs   
   
                      Color (U)  YELLOW     Normal     YELLOW     [facility]  
   
                                        Comment on above:   Performed By: #### U  
MICRO, ERUR ####  
[facility]  
[address] [place] [address]  
Dr. Nikole Jacobs   
   
                                        ERUAHD              A micrscopic   
examination will be   
performed if   
indicated.          Normal                                  [facility]  
   
                                        Comment on above:   Performed By: #### U  
MICRO, ERUR ####  
[facility]  
[address] [address]  
Dr. Nikole Jacobs   
   
                      Glucose Ql (U) 250 mg/dl  Abnormal   NEGATIVE   [facility]  
   
                                        Comment on above:   Performed By: #### U  
MICRO, ERUR ####  
[facility]  
[address] [address]  
Dr. Nikole Jacobs   
   
                      Hemoglobin Ql (U) Negative   Normal     NEGATIVE   [facility]  
   
                                        Comment on above:   Performed By: #### U  
MICRO, ERUR ####  
[facility]  
[address] [address]  
Dr. Nikole Jacobs   
   
                      Ketones Ql (U) Negative   Normal     NEGATIVE   [facility]  
   
                                        Comment on above:   Performed By: #### U  
MICRO, ERUR ####  
[facility]  
[address] [place] [address]  
Dr. Nikole Jacobs   
   
                      LEUKOCYTES MODERATE   Abnormal   NEGATIVE   [facility]  
   
                                        Comment on above:   Performed By: #### U  
MICRO, ERUR ####  
[facility]  
[address] [address]  
Dr. Nikole Jacobs   
   
                      Nitrite Ql (U) Negative   Normal     NEGATIVE   [facility]  
   
                                        Comment on above:   Performed By: #### U  
MICRO, ERUR ####  
[facility]  
[address] [address]  
Dr. Nikole Jacobs   
   
                      pH (U)     5.5 [pH]   Normal     5-9        [facility]  
   
                                        Comment on above:   Performed By: #### U  
MICRO, ERUR ####  
[facility]  
[address] [address]  
Dr. Nikole Jacobs   
   
                      SPEC GRAVITY 1.025      Normal     1.005-<=1.025 [facility]  
   
                                        Comment on above:   Performed By: #### U  
MICRO, ERUR ####  
[facility]  
[address] [place] [address]  
Dr. Nikole Jacobs   
   
                          UA PROTEIN   Negative     Normal       NEGATIVE/   
TRACE                                   [facility]  
   
                                        Comment on above:   Performed By: #### U  
MICRO, ERUR ####  
[facility]  
[address] [address]  
Dr. Nikole Jacobs   
   
                      UR MICRO IND INDICATED  Normal                [facility]  
   
                                        Comment on above:   Performed By: #### U  
MICRO, ERUR ####  
[facility]  
[address] [address]  
Dr. Nikole Jacobs   
   
                      Urobilinogen Qn (U) 0.2 {Enma'U}/dL Normal     0.2 - 1.  
0  [facility]  
   
                                        Comment on above:   Performed By: #### U  
MICRO, ERUR ####  
[facility]  
[address] [place] [address]  
Dr. Nikole Jacobs   
   
                                                    POINT OF CARE GLUCOSEon    
   
                      Glucose [Mass/Vol] 142 mg/dL  Critically high      [facility]  
   
                                        Comment on above:   Performed By: #### C  
BC ####  
[facility]  
[address] [place] [address]  
Dr. Nikole Jacobs   
   
                                                    PROF 14(COMP METB)on   
023   
   
                      Albumin [Mass/Vol] 3.6 g/dL   Normal     3.4-5.0    [facility]  
   
                                        Comment on above:   Performed By: #### C  
BROOK, HSTROPN ####  
[facility]  
[address] [address]  
Dr. Nikole Jacobs   
   
                                                    Albumin/Globulin   
[Mass ratio]    1.2 {ratio}     Normal                          [facility]  
   
                                        Comment on above:   Performed By: #### C  
BROOK, HSTROPN ####  
[facility]  
[address] [address]  
Dr. Nikole Jacobs   
   
                                                    ALP [Catalytic   
activity/Vol]   77 U/L          Normal                    [facility]  
   
                                        Comment on above:   Performed By: #### C  
BROOK, HSTROPN ####  
[facility]  
[address] [place] [address]  
Dr. Nikole Jacobs   
   
                                                    ALT [Catalytic   
activity/Vol]   39 U/L          Normal          14-59           [facility]  
   
                                        Comment on above:   Performed By: #### C  
BROOK, HSTROPN ####  
[facility]  
[address] [address]  
Dr. Nikole Jacobs   
   
                                                    Anion gap   
[Moles/Vol]     13.2 mmol/L     Normal                          [facility]  
   
                                        Comment on above:   Performed By: #### C  
BROOK, HSTROPN ####  
[facility]  
[address] [place] [address]  
Dr. Nikole Jacobs   
   
                                                    AST [Catalytic   
activity/Vol]   20 U/L          Normal          15-37           [facility]  
   
                                        Comment on above:   Performed By: #### C  
BROOK, HSTROPN ####  
[facility]  
[address] [address]  
Dr. Nikole Jacobs   
   
                                                    Bilirubin   
[Mass/Vol]      0.4 mg/dL       Normal          0.2-1.0         [facility]  
   
                                        Comment on above:   Performed By: #### C  
BROOK, HSTROPN ####  
[facility]  
[address] [place] [address]  
Dr. Nikole Jacobs   
   
                      Calcium [Mass/Vol] 9.2 mg/dL  Normal     8.5-10.1   [facility]  
   
                                        Comment on above:   Performed By: #### C  
BROOK, HSTROPN ####  
[facility]  
[address] [address]  
Dr. Nikole Jacobs   
   
                                                    Chloride   
[Moles/Vol]     98 mmol/L       Normal                    [facility]  
   
                                        Comment on above:   Performed By: #### C  
MP, HSTROPN ####  
[facility]  
[address] [address]  
Dr. Nikole Jacobs   
   
                      CO2 [Moles/Vol] 30.3 mmol/L Normal     21.0-32.0  [facility]  
   
                                        Comment on above:   Performed By: #### C  
MP, HSTROPN ####  
[facility]  
[address] [address]  
Dr. Nikole Jacobs   
   
                                                    Creatinine   
[Mass/Vol]      1.58 mg/dL      Critically high 0.55-1.02       [facility]  
   
                                        Comment on above:   Performed By: #### C  
MP, HSTROPN ####  
[facility]  
[address] [place] [address]  
Dr. Nikole Jacobs   
   
                      EGFR-AF AMERICAN 40 mL/min/1.73m2 Critically low >=60       
  [facility]  
   
                                        Comment on above:   Performed By: #### C  
MP, HSTROPN ####  
[facility]  
[address] [place] [address]  
Dr. Nikole Jacobs   
   
                                                    EGFR-NON AF   
AMERICAN        33 mL/min/1.73m2 Critically low  >=60            [facility]  
   
                                        Comment on above:   Performed By: #### C  
MP, HSTROPN ####  
[facility]  
[address] [address]  
Dr. Nikole Jacobs   
   
                                                    Globulin (S)   
[Mass/Vol]      2.9 g/dL        Normal                          [facility]  
   
                                        Comment on above:   Performed By: #### C  
MP, HSTROPN ####  
[facility]  
[address] [address]  
Dr. Nikole Jacobs   
   
                      Glucose [Mass/Vol] 156 mg/dL  Critically high      T  
[facility]  
   
                                        Comment on above:   Performed By: #### C  
MP, HSTROPN ####  
[facility]  
[address] [place] [address]  
Dr. Nikole Jacobs   
   
                                                    Potassium   
[Moles/Vol]     3.5 mmol/L      Normal          3.5-5.1         [facility]  
   
                                        Comment on above:   Performed By: #### C  
MP, HSTROPN ####  
[facility]  
[address] [address]  
Dr. Nikole Jacobs   
   
                      Protein [Mass/Vol] 6.5 g/dL   Normal     6.4-8.2    [facility]  
   
                                        Comment on above:   Performed By: #### C  
MP, HSTROPN ####  
[facility]  
[address] [place] [address]  
Dr. Nikole Jacobs   
   
                      Sodium [Moles/Vol] 138 mmol/L Normal     136-145    [facility]  
   
                                        Comment on above:   Performed By: #### C  
MP, HSTROPN ####  
[facility]  
[address] [place] [address]  
Dr. Nikole Jacobs   
   
                                                    Urea nitrogen   
[Mass/Vol]      26.0 mg/dL      Critically high 7.0-18.0        [facility]  
   
                                        Comment on above:   Performed By: #### C  
MP, HSTROPN ####  
[facility]  
[address] [place] [address]  
Dr. Nikole Jacobs   
   
                                                    Urea   
nitrogen/Creatinine   
[Mass ratio]    16.5 mg/mg      Normal                          [facility]  
   
                                        Comment on above:   Performed By: #### C  
MP, HSTROPN ####  
[facility]  
[address] [place] [address]  
Dr. Nikole Jacobs   
   
                                                    TROPONIN, HIGH SENSITIVITYon  
 2023   
   
                      HSTROP     35.4 pg/mL Normal     4.0-51.3   [facility]  
   
                                        Comment on above:   Result Comment: CUT-  
OFF POINTS HAVE BEEN ESTABLISHED BASED ON   
THE   
FOURTH UNIVERSAL DEFINITIONS OF MYOCARDIAL  
INFARCTION. THE UPPER REFERENCE LIMIT (URL) OF TROPONIN, DEFINED   
AS THE 99TH PERCENTILE OF  
cTnI DISTRIBUTION IN A REFERENCE POPULATION, HAS BEEN CONFIRMED AS   
THE DECISION THRESHOLD  
FOR MI DIAGNOSIS.   
   
                                                            Performed By: #### H  
STROPN ####  
[facility]  
[address] [place] [address]  
Dr. Nikole Jacobs   
   
                      HSTROP     36.5 pg/mL Normal     4.0-51.3   [facility]  
   
                                        Comment on above:   Result Comment: CUT-  
OFF POINTS HAVE BEEN ESTABLISHED BASED ON   
THE   
FOURTH UNIVERSAL DEFINITIONS OF MYOCARDIAL  
INFARCTION. THE UPPER REFERENCE LIMIT (URL) OF TROPONIN, DEFINED   
AS THE 99TH PERCENTILE OF  
cTnI DISTRIBUTION IN A REFERENCE POPULATION, HAS BEEN CONFIRMED AS   
THE DECISION THRESHOLD  
FOR MI DIAGNOSIS.   
   
                                                            Performed By: #### C  
MP, HSTROPN ####  
[facility]  
[address] [place] [address]  
Dr. Nikole Jacobs   
   
                                                    URINE MICROSCOPIC ONLYon    
   
                      BACTERIA   TRACE      Abnormal   NONE SEEN  [facility]  
   
                                        Comment on above:   Performed By: #### U  
MICRO, ERUR ####  
[facility]  
[address] [place] [address]  
Dr. Nikole Jacobs   
   
                                                    Bacteria identified   
Cx Nom (U)      INDICATED       Normal                          [facility]  
   
                                        Comment on above:   Performed By: #### U  
MICRO, ERUR ####  
[facility]  
[address] [place] [address]  
Dr. Nikole Jacobs   
   
                      CAST       NONE SEEN  Normal     NONE SEEN  [facility]  
   
                                        Comment on above:   Performed By: #### U  
MICRO, ERUR ####  
[facility]  
[address] [place] [address]  
Dr. Nikole Jacobs   
   
                                                    Crystals LM Nom   
(Urine sed)     SEEN            Abnormal        NONE SEEN       [facility]  
   
                                        Comment on above:   Performed By: #### U  
MICRO, ERUR ####  
[facility]  
[address] [place] [address]  
Dr. Nikole Jacobs   
   
                                                    Epithelial cells LM   
Ql (Urine sed)      MODERATE            Abnormal            NONE SEEN   
/RARE                                   [facility]  
   
                                        Comment on above:   Performed By: #### U  
MICRO, ERUR ####  
[facility]  
[address] [place] [address]  
Dr. Nikole Jacobs   
   
                      MUCOUS     NONE SEEN  Normal     NONE SEEN  [facility]  
   
                                        Comment on above:   Performed By: #### U  
MICRO, ERUR ####  
[facility]  
[address] [place] [address]  
Dr. Nikole Jacobs   
   
                      RBC        2-5        Abnormal   0-2        [facility]  
   
                                        Comment on above:   Performed By: #### U  
MICRO, ERUR ####  
[facility]  
[address] [place] [address]  
Dr. Nikole Jacobs   
   
                      WBC        10-20      Abnormal   NONE SEEN  [facility]  
   
                                        Comment on above:   Performed By: #### U  
MICRO, ERUR ####  
[facility]  
[address] [place] [address]  
Dr. Nikole Jacobs   
   
                                                    CULTURE URINEon 2023   
   
                                        CULTURE URINE       Culture Observations  
:  
MODERATE GROWTH OF   
MIXED GENITAL ARMAND.   
NO POTENTIAL PATHOGENS   
SEEN.               Normal                                  [facility]  
   
                                        Comment on above:   Performed By: #### C  
MP ####  
[facility]  
[address] [place] [address]  
Dr. Nikole Jacobs   
   
                                                    UA RANDOM W/MICROSCOPICon    
   
                      BACTERIA   SMALL      Abnormal   NONE SEEN  [facility]  
   
                                        Comment on above:   Performed By: #### U  
MICRO, ERUR ####  
[facility]  
[address] [place] [address]  
Dr. Nikole Jacobs   
   
                      Bilirubin Ql (U) MODERATE   Abnormal   NEGATIVE   [facility]  
   
                                        Comment on above:   Performed By: #### U  
MICRO, ERUR ####  
[facility]  
[address] [place] [address]  
Dr. Nikole Jacobs   
   
                      CAST       NONE SEEN  Normal     NONE SEEN  [facility]  
   
                                        Comment on above:   Performed By: #### U  
MICRO, ERUR ####  
[facility]  
[address] [place] [address]  
Dr. Nikole Jacobs   
   
                      Clarity (U) CLEAR      Normal     CLEAR      [facility]  
   
                                        Comment on above:   Performed By: #### U  
MICRO, ERUR ####  
[facility]  
[address] [place] [address]  
Dr. Nikole Jacobs   
   
                      Color (U)  LT. YELLOW Normal     YELLOW     [facility]  
   
                                        Comment on above:   Performed By: #### U  
MICRO, ERUR ####  
[facility]  
[address] [place] [address]  
Dr. Nkiole Jacobs   
   
                                                    Crystals LM Nom   
(Urine sed)     NONE SEEN       Normal          NONE SEEN       [facility]  
   
                                        Comment on above:   Performed By: #### U  
MICRO, ERUR ####  
[facility]  
[address] [place] [address]  
Dr. Nikole Jacobs   
   
                                                    Epithelial cells LM   
Ql (Urine sed)      FEW                 Abnormal            NONE SEEN   
/RARE                                   [facility]  
   
                                        Comment on above:   Performed By: #### U  
MICRO, ERUR ####  
[facility]  
[address] [place] [address]  
Dr. Nikole Jacobs   
   
                      Glucose Ql (U) Negative   Normal     NEGATIVE   [facility]  
   
                                        Comment on above:   Performed By: #### U  
MICRO, ERUR ####  
[facility]  
[address] [address]  
Dr. Nikole Jacobs   
   
                      Hemoglobin Ql (U) TRACE-INTACT Abnormal   NEGATIVE   [facility]  
   
                                        Comment on above:   Performed By: #### U  
MICRO, ERUR ####  
[facility]  
[address] [address]  
Dr. Nikole Jacobs   
   
                      Ketones Ql (U) TRACE      Abnormal   NEGATIVE   [facility]  
   
                                        Comment on above:   Performed By: #### U  
MICRO, ERUR ####  
[facility]  
[address] [address]  
Dr. Nikole Jacobs   
   
                      LEUKOCYTES MODERATE   Abnormal   NEGATIVE   [facility]  
   
                                        Comment on above:   Performed By: #### U  
MICRO, ERUR ####  
[facility]  
[address] [place] [address]  
Dr. Nikole Jacobs   
   
                      MUCOUS     NONE SEEN  Normal     NONE SEEN  [facility]  
   
                                        Comment on above:   Performed By: #### U  
MICRO, ERUR ####  
[facility]  
[address] [address]  
Dr. Nikole Jacobs   
   
                      Nitrite Ql (U) Negative   Normal     NEGATIVE   [facility]  
   
                                        Comment on above:   Performed By: #### U  
MICRO, ERUR ####  
[facility]  
[address] [address]  
Dr. Nikole Jacobs   
   
                      pH (U)     5.5 [pH]   Normal     5-9        [facility]  
   
                                        Comment on above:   Performed By: #### U  
MICRO, ERUR ####  
[facility]  
[address] [place] [address]  
Dr. Nikole Jacobs   
   
                      RBC        0-2        Normal     0-2        [facility]  
   
                                        Comment on above:   Performed By: #### U  
MICRO, ERUR ####  
[facility]  
[address] [place] [address]  
Dr. Nikole Jacobs   
   
                      SPEC GRAVITY >=1.030    Abnormal   1.005-<=1.025 [facility]  
   
                                        Comment on above:   Performed By: #### U  
MICRO, ERUR ####  
[facility]  
[address] [place] [address]  
Dr. Nikole Jacobs   
   
                          UA PROTEIN   TRACE        Normal       NEGATIVE/   
TRACE                                   [facility]  
   
                                        Comment on above:   Performed By: #### U  
MICRO, ERUR ####  
[facility]  
[address] [address]  
Dr. Nikole Jacobs   
   
                      Urobilinogen Qn (U) 0.2 {Enma'U}/dL Normal     0.2 - 1.  
0  [facility]  
   
                                        Comment on above:   Performed By: #### U  
MICRO, ERUR ####  
[facility]  
[address] [address]  
Dr. Nikole Jacobs   
   
                      WBC        20-50      Abnormal   NONE SEEN  [facility]  
   
                                        Comment on above:   Performed By: #### U  
MICRO, ERUR ####  
[facility]  
[address] [address]  
Dr. Nikole Jacobs   
   
                                                    US KIDNEYSon 2023   
   
                                        US KIDNEYS          EXAM: US KIDNEYS  
HISTORY: Abnormal   
renal function  
COMPARISON: None.  
TECHNIQUE: Multiple   
sonographic images of   
the kidneys and   
urinary bladder were  
obtained, supplemented   
with Doppler.  
FINDINGS: The right   
kidney measures 9.2 x   
4.4 x 6.8 cm, with   
cortical thickness  
of 15 mm. No focal   
abnormality is   
identified within.   
There is no evidence   
of  
hydronephrosis.  
The left kidney   
measures 9.8 x 4.7 x   
6.4 cm. The cortex   
measures 11 mm in  
thickness. An   
echogenic focus is   
present measuring 0.5   
x 0.4 x 0.4 cm. No   
focal  
abnormality is   
otherwise identified   
within. There is no   
evidence of  
hydronephrosis.  
The urinary bladder is   
not very well   
distended, with a   
calculated volume of   
27  
cc. Incidentally noted   
is a leiomyoma in the   
uterus measuring 2.0 x   
2.6 x 2.0  
cm.  
IMPRESSION:  
An echogenic focus in   
the upper pole of the   
left kidney may be a   
renal calculus.  
There is no evidence   
of an intrarenal mass   
on either side, and no   
evidence of  
hydronephrosis.  
The urinary bladder   
evaluation is limited   
by the lack of   
distention.  
Electronically   
authenticated by:   
SANG COULTER Date:   
2023 13:20    Normal                                  [facility]  
   
                                                    CBC AUTO DIFFon 2023   
   
                      BASO #     0.1 103/ul Normal     0.0-0.1    [facility]  
   
                                        Comment on above:   Performed By: #### U  
MICRO, ERUR ####  
[facility]  
[address] [address]  
Dr. Nikole Jacobs   
   
                                                    Basophils/100 WBC   
(Bld)           0.4 %           Normal          0.2-2.0         [facility]  
   
                                        Comment on above:   Performed By: #### U  
MICRO, ERUR ####  
[facility]  
[address] [place] [address]  
Dr. Nikole Jacobs   
   
                      EO #       0.1 103/ul Normal     0.0-0.7    [facility]  
   
                                        Comment on above:   Performed By: #### U  
MICRO, ERUR ####  
[facility]  
[address] [address]  
Dr. Nikole Jacobs   
   
                                                    Eosinophils/100 WBC   
(Bld)           0.9 %           Normal          0.9-7.0         [facility]  
   
                                        Comment on above:   Performed By: #### U  
MICRO, ERUR ####  
[facility]  
[address] [place] [address]  
Dr. Nikole Jacobs   
   
                                                    Erythrocyte   
distribution width   
(RBC) [Ratio]   13.6 %          Normal          11.0-15.0       [facility]  
   
                                        Comment on above:   Performed By: #### U  
MICRO, ERUR ####  
[facility]  
[address] [place] [address]  
Dr. Nikole Jacobs   
   
                                                    Hematocrit (Bld)   
[Volume fraction] 33.4 %          Critically low  36.0-48.0       [facility]  
   
                                        Comment on above:   Performed By: #### U  
MICRO, ERUR ####  
[facility]  
[address] [place] [address]  
Dr. Nikole Jacobs   
   
                                                    Hemoglobin (Bld)   
[Mass/Vol]      10.9 g/dL       Critically low  12.0-16.0       [facility]  
   
                                        Comment on above:   Performed By: #### U  
MICRO, ERUR ####  
[facility]  
[address] [place] [address]  
Dr. Nikole Jacobs   
   
                      IG #       0.21 10e3/ul Critically high 0.00-0.03  [facility]  
   
                                        Comment on above:   Performed By: #### U  
MICRO, ERUR ####  
[facility]  
[address] [place] [address]  
Dr. Nikole Jacobs   
   
                      IG %       1.5 %      Critically high 0.0-0.5    [facility]  
   
                                        Comment on above:   Performed By: #### U  
MICRO, ERUR ####  
[facility]  
[address] [address]  
Dr. Nikole Jacobs   
   
                      LYMPH #    2.8 103/ul Normal     1.2-3.8    [facility]  
   
                                        Comment on above:   Performed By: #### U  
MICRO, ERUR ####  
[facility]  
[address] [address]  
Dr. Nikole Jacobs   
   
                                                    Lymphocytes/100 WBC   
(Bld)           19.9 %          Critically low  20.5-60.0       [facility]  
   
                                        Comment on above:   Performed By: #### U  
MICRO, ERUR ####  
[facility]  
[address] [address]  
Dr. Nikole Jacobs   
   
                      MANUAL DIFF REQ NO         Normal                [facility] [place]  
   
                                        Comment on above:   Performed By: #### U  
MICRO, ERUR ####  
[facility]  
[address] [place] [address]  
Dr. Nikole Jacobs   
   
                                                    MCH (RBC) [Entitic   
mass]           31.6 pg         Normal          26.7-34.0       [facility]  
   
                                        Comment on above:   Performed By: #### U  
MICRO, ERUR ####  
[facility]  
[address] [place] [address]  
Dr. Nikole Jacobs   
   
                                                    MCHC (RBC)   
[Mass/Vol]      32.6 g/dL       Normal          29.9-35.2       [facility]  
   
                                        Comment on above:   Performed By: #### U  
MICRO, ERUR ####  
[facility]  
[address] [place] [address]  
Dr. Nikole Jacobs   
   
                                                    MCV (RBC) [Entitic   
vol]            96.8 fL         Normal          81.0-99.0       [facility]  
   
                                        Comment on above:   Performed By: #### U  
MICRO, ERUR ####  
[facility]  
[address] [place] [address]  
Dr. Nikole Jacobs   
   
                      MONO #     1.3 103/ul Critically high 0.3-0.8    [facility] [place]  
   
                                        Comment on above:   Performed By: #### U  
MICRO, ERUR ####  
[facility]  
[address] [place] [address]  
Dr. Nikole Jacobs   
   
                                                    Monocytes/100 WBC   
(Bld)           9.1 %           Normal          1.7-12.0        [facility]  
   
                                        Comment on above:   Performed By: #### U  
MICRO, ERUR ####  
[facility]  
[address] [address]  
Dr. Nikole Jacobs   
   
                      NEUT #     9.5 103/ul Critically high 1.4-6.5    [facility]  
   
                                        Comment on above:   Performed By: #### U  
MICRO, ERUR ####  
[facility]  
[address] [address]  
Dr. Nikole Jacobs   
   
                                                    Neutrophils/100 WBC   
(Bld)           68.2 %          Normal          43.0-75.0       [facility]  
   
                                        Comment on above:   Performed By: #### U  
MICRO, ERUR ####  
[facility]  
[address] [address]  
Dr. Nikole Jacobs   
   
                                                    Platelet mean   
volume (Bld)   
[Entitic vol]   9.4 fL          Critically low  9.5-13.5        [facility]  
   
                                        Comment on above:   Performed By: #### U  
MICRO, ERUR ####  
[facility]  
[address] [place] [address]  
Dr. Nikole Jacobs   
   
                      PLT        476 103/ul Critically high 150-450    [facility] [place]  
   
                                        Comment on above:   Performed By: #### U  
MICRO, ERUR ####  
[facility]  
[address] [place] [address]  
Dr. Nikole Jacobs   
   
                      RBC        3.45 106/ul Critically low 4.20-5.40  [facility]  
   
                                        Comment on above:   Performed By: #### U  
MICRO, ERUR ####  
[facility]  
[address] [place] [address]  
Dr. Nikole Jacobs   
   
                      WBC        14.0 103/ul Critically high 4.0-11.0   [facility]  
   
                                        Comment on above:   Performed By: #### U  
MICRO, ERUR ####  
[facility]  
[address] [address]  
Dr. Nikole Jacobs   
   
                                                    PROF 14(COMP METB)on   
023   
   
                      Albumin [Mass/Vol] 3.6 g/dL   Normal     3.4-5.0    [facility]  
   
                                        Comment on above:   Performed By: #### C  
MP ####  
[facility]  
[address] [place] [address]  
Dr. Nikole Jacobs   
   
                                                    Albumin/Globulin   
[Mass ratio]    1.2 {ratio}     Normal                          [facility]  
   
                                        Comment on above:   Performed By: #### C  
MP ####  
[facility]  
[address] [place] [address]  
Dr. Nikole Jacobs   
   
                                                    ALP [Catalytic   
activity/Vol]   83 U/L          Normal                    [facility]  
   
                                        Comment on above:   Performed By: #### C  
MP ####  
[facility]  
[address] [place] [address]  
Dr. Nikole Jacobs   
   
                                                    ALT [Catalytic   
activity/Vol]   24 U/L          Normal          14-59           [facility]  
   
                                        Comment on above:   Performed By: #### C  
MP ####  
[facility]  
[address] [place] [address]  
Dr. Nikole Jacobs   
   
                                                    Anion gap   
[Moles/Vol]     14.4 mmol/L     Normal                          [facility]  
   
                                        Comment on above:   Performed By: #### C  
MP ####  
[facility]  
[address] [place] [address]  
Dr. Nikole Jacobs   
   
                                                    AST [Catalytic   
activity/Vol]   15 U/L          Normal          15-37           [facility]  
   
                                        Comment on above:   Performed By: #### C  
MP ####  
[facility]  
[address] [place] [address]  
Dr. Nikole Jacobs   
   
                                                    Bilirubin   
[Mass/Vol]      0.3 mg/dL       Normal          0.2-1.0         [facility]  
   
                                        Comment on above:   Performed By: #### C  
MP ####  
[facility]  
[address] [place] [address]  
Dr. Nikole Jacobs   
   
                      Calcium [Mass/Vol] 9.3 mg/dL  Normal     8.5-10.1   [facility]  
   
                                        Comment on above:   Performed By: #### C  
MP ####  
[facility]  
[address] [place] [address]  
Dr. Nikole Jacobs   
   
                                                    Chloride   
[Moles/Vol]     100 mmol/L      Normal                    [facility]  
   
                                        Comment on above:   Performed By: #### C  
MP ####  
[facility]  
[address] [place] [address]  
Dr. Nikole Jaocbs   
   
                      CO2 [Moles/Vol] 26.8 mmol/L Normal     21.0-32.0  [facility]  
   
                                        Comment on above:   Performed By: #### C  
MP ####  
[facility]  
[address] [address]  
Dr. Nikole Jacobs   
   
                                                    Creatinine   
[Mass/Vol]      2.20 mg/dL      Critically high 0.55-1.02       [facility]  
   
                                        Comment on above:   Performed By: #### C  
MP ####  
[facility]  
[address] [address]  
Dr. Nikole Jacobs   
   
                      EGFR-AF AMERICAN 27 mL/min/1.73m2 Critically low >=60       
  [facility]  
   
                                        Comment on above:   Performed By: #### C  
MP ####  
[facility]  
[address] [address]  
Dr. Nikole Jacobs   
   
                                                    EGFR-NON AF   
AMERICAN        22 mL/min/1.73m2 Critically low  >=60            [facility]  
   
                                        Comment on above:   Performed By: #### C  
MP ####  
[facility]  
[address] [address]  
Dr. Nikole Jacobs   
   
                                                    Globulin (S)   
[Mass/Vol]      3.1 g/dL        Normal                          [facility]  
   
                                        Comment on above:   Performed By: #### C  
MP ####  
[facility]  
[address] [address]  
Dr. Nikole Jacobs   
   
                      Glucose [Mass/Vol] 126 mg/dL  Critically high      T  
[facility]  
   
                                        Comment on above:   Performed By: #### C  
MP ####  
[facility]  
[address] [address]  
Dr. Nikole Jacobs   
   
                                                    Potassium   
[Moles/Vol]     4.2 mmol/L      Normal          3.5-5.1         [facility]  
   
                                        Comment on above:   Performed By: #### C  
MP ####  
[facility]  
[address] [address]  
Dr. Nikole Jacobs   
   
                      Protein [Mass/Vol] 6.7 g/dL   Normal     6.4-8.2    [facility]  
   
                                        Comment on above:   Performed By: #### C  
MP ####  
[facility]  
[address] [address]  
Dr. Nikole Jacobs   
   
                      Sodium [Moles/Vol] 137 mmol/L Normal     136-145    [facility]  
   
                                        Comment on above:   Performed By: #### C  
MP ####  
[facility]  
[address] [place], [place] [address]  
Dr. Nikole Jacobs   
   
                                                    Urea nitrogen   
[Mass/Vol]      39.0 mg/dL      Critically high 7.0-18.0        [facility]  
   
                                        Comment on above:   Performed By: #### C  
MP ####  
[facility]  
[address] [place], [place] [address]  
Dr. Nikole Jacobs   
   
                                                    Urea   
nitrogen/Creatinine   
[Mass ratio]    17.7 mg/mg      Normal                          [facility]  
   
                                        Comment on above:   Performed By: #### C  
MP ####  
[facility]  
[address] [place], [place] [address]  
Dr. Nikole Jacobs   
   
                                                    MRI LSPINE WO CONon 20   
   
                                        MRI LSPINE WO CON   EXAMINATION: MRI   
LSPINE WO CON  
HISTORY: Spinal   
stenosis  
COMPARISON: No   
relevant comparison   
available.  
TECHNIQUE: A variety   
of imaging planes and   
parameters were   
utilized for  
visualization of   
suspected pathology.  
FINDINGS:  
For the purposes of   
numbering, sagittal T2   
image # 8 extends from   
the T10  
vertebral body   
superiorly to the S3   
level inferiorly.  
PARASPINAL AREA:   
Normal with no visible   
mass.  
BONES: 4 mm   
anterolisthesis of L4   
in relation L5.   
Moderate diffuse   
degenerative  
spondylosis. Areas of   
signal abnormality in   
the T12 and L1   
vertebral body likely  
representing   
hemangiomas.  
CORD/CAUDA EQUINA:   
Normal caliber,   
contour, and signal   
intensity.  
DISC LEVELS:  
12-L1: Moderate disc   
space narrowing and   
disc desiccation. Mild   
posterior disc  
protrusion. No central   
canal or foraminal   
stenosis  
L1-L2: Moderate   
degenerative disc   
disease is present   
without visible neural  
impingement.  
L2-L3: Early   
degenerative disc   
disease is present   
without focal   
protrusion or  
neural impingement.  
L3-L4: Early   
degenerative disc   
disease is present   
without focal   
protrusion or  
neural impingement.  
L4-L5: 4 mm   
anterolisthesis of L4   
on L5. Moderate   
diffuse   
bulge/pseudobulge with  
ligamentum flavum   
hypertrophy and facet   
osteoarthropathy. No   
central canal  
stenosis. Mild right   
and no left foraminal   
stenosis  
L5-S1: Disc   
desiccation. Extension   
of the disc into the   
inferior endplate of   
L5,  
Schmorl's node. Mild   
broad-based posterior   
disc herniation the   
protrusion type  
extending up to 3.2   
mm. No central or   
right foraminal   
stenosis. Mild   
narrowing  
of the left neural   
foramen.  
IMPRESSION:  
Degenerative changes   
resulting in mild   
right L4-L5 and left   
L5-S1 neural  
foraminal stenosis  
Electronically   
authenticated by:   
MARV HERNANDEZ Date:   
2023 16:39    Normal                                  [facility]  
   
                                                    CBC AUTO DIFFon 2022   
   
                      BASO #     0.1 103/ul Normal     0.0-0.1    [facility]  
   
                                        Comment on above:   Performed By: #### C  
BC ####  
[facility]  
[address] [place] [address]  
Dr. Nikole Jacobs   
   
                                                    Basophils/100 WBC   
(Bld)           0.5 %           Normal          0.2-2.0         [facility]  
   
                                        Comment on above:   Performed By: #### C  
BC ####  
[facility]  
[address] [place] [address]  
Dr. Nikole Jacobs   
   
                      EO #       0.1 103/ul Normal     0.0-0.7    [facility]  
   
                                        Comment on above:   Performed By: #### C  
BC ####  
[facility]  
[address] [place] [address]  
Dr. Nikole Jacobs   
   
                                                    Eosinophils/100 WBC   
(Bld)           1.1 %           Normal          0.9-7.0         [facility]  
   
                                        Comment on above:   Performed By: #### C  
BC ####  
[facility]  
[address] [place] [address]  
Dr. Nikole Jacobs   
   
                                                    Erythrocyte   
distribution width   
(RBC) [Ratio]   14.2 %          Normal          11.0-15.0       [facility]  
   
                                        Comment on above:   Performed By: #### C  
BC ####  
[facility]  
[address] [place] [address]  
Dr. Nikole Jacobs   
   
                                                    Hematocrit (Bld)   
[Volume fraction] 32.7 %          Critically low  36.0-48.0       [facility]  
   
                                        Comment on above:   Performed By: #### C  
BC ####  
[facility]  
[address] [place] [address]  
Dr. Nikole Jacobs   
   
                                                    Hemoglobin (Bld)   
[Mass/Vol]      10.9 g/dL       Critically low  12.0-16.0       [facility]  
   
                                        Comment on above:   Performed By: #### C  
BC ####  
[facility]  
[address] [place] [address]  
Dr. Nikole Jacobs   
   
                      IG #       0.22 10e3/ul Critically high 0.00-0.03  [facility]  
   
                                        Comment on above:   Performed By: #### C  
BC ####  
[facility]  
[address] [place] [address]  
Dr. Nikole Jacobs   
   
                      IG %       2.2 %      Critically high 0.0-0.5    [facility]  
   
                                        Comment on above:   Performed By: #### C  
BC ####  
[facility]  
[address] [place] [address]  
Dr. Nikole Jacobs   
   
                      LYMPH #    2.1 103/ul Normal     1.2-3.8    [facility]  
   
                                        Comment on above:   Performed By: #### C  
BC ####  
[facility]  
[address] [place] [address]  
Dr. Nikole Jacobs   
   
                                                    Lymphocytes/100 WBC   
(Bld)           20.9 %          Normal          20.5-60.0       [facility]  
   
                                        Comment on above:   Performed By: #### C  
BC ####  
[facility]  
[address] [place] [address]  
Dr. Nikole Jacobs   
   
                      MANUAL DIFF REQ NO         Normal                [facility]  
   
                                        Comment on above:   Performed By: #### C  
BC ####  
[facility]  
[address] [place] [address]  
Dr. Nikole Jacobs   
   
                                                    MCH (RBC) [Entitic   
mass]           31.9 pg         Normal          26.7-34.0       [facility]  
   
                                        Comment on above:   Performed By: #### C  
BC ####  
[facility]  
[address] [place] [address]  
Dr. Nikole Jacobs   
   
                                                    MCHC (RBC)   
[Mass/Vol]      33.3 g/dL       Normal          29.9-35.2       [facility]  
   
                                        Comment on above:   Performed By: #### C  
BC ####  
[facility]  
[address] [place] [address]  
Dr. Nikole Jacobs   
   
                                                    MCV (RBC) [Entitic   
vol]            95.6 fL         Normal          81.0-99.0       [facility]  
   
                                        Comment on above:   Performed By: #### C  
BC ####  
[facility]  
[address] [place] [address]  
Dr. Nikole Jacobs   
   
                      MONO #     1.0 103/ul Critically high 0.3-0.8    [facility]  
   
                                        Comment on above:   Performed By: #### C  
BC ####  
[facility]  
[address] [address]  
Dr. Nikole Jacobs   
   
                                                    Monocytes/100 WBC   
(Bld)           10.1 %          Normal          1.7-12.0        [facility]  
   
                                        Comment on above:   Performed By: #### C  
BC ####  
[facility]  
[address] [address]  
Dr. Nikole Jacobs   
   
                      NEUT #     6.4 103/ul Normal     1.4-6.5    [facility]  
   
                                        Comment on above:   Performed By: #### C  
BC ####  
[facility]  
[address] [address]  
Dr. Nikole Jacobs   
   
                                                    Neutrophils/100 WBC   
(Bld)           65.2 %          Normal          43.0-75.0       [facility]  
   
                                        Comment on above:   Performed By: #### C  
BC ####  
[facility]  
[address] [address]  
Dr. Nikole Jacobs   
   
                                                    Platelet mean   
volume (Bld)   
[Entitic vol]   8.9 fL          Critically low  9.5-13.5        [facility]  
   
                                        Comment on above:   Performed By: #### C  
BC ####  
[facility]  
[address] [address]  
Dr. Nikole Jacobs   
   
                      PLT        409 103/ul Normal     150-450    [facility]  
   
                                        Comment on above:   Performed By: #### C  
BC ####  
[facility]  
[address] [address]  
Dr. Nikole Jacobs   
   
                      RBC        3.42 106/ul Critically low 4.20-5.40  [facility] [place]  
   
                                        Comment on above:   Performed By: #### C  
BC ####  
[facility]  
[address] [address]  
Dr. Nikole Jacobs   
   
                      WBC        9.8 103/ul Normal     4.0-11.0   [facility]  
   
                                        Comment on above:   Performed By: #### C  
BC ####  
[facility]  
[address] [place] [address]  
Dr. Nikole Jacobs   
   
                                                    PROF 14(COMP METB)on   
022   
   
                      Albumin [Mass/Vol] 3.3 g/dL   Critically low 3.4-5.0    Th  
[facility]  
   
                                        Comment on above:   Performed By: #### C  
MP ####  
[facility]  
[address] [address]  
Dr. Nikole Jacobs   
   
                                                    Albumin/Globulin   
[Mass ratio]    1.0 {ratio}     Normal                          [facility]  
   
                                        Comment on above:   Performed By: #### C  
MP ####  
[facility]  
[address] [place] [address]  
Dr. Nikole Jacobs   
   
                                                    ALP [Catalytic   
activity/Vol]   79 U/L          Normal                    [facility]  
   
                                        Comment on above:   Performed By: #### C  
MP ####  
[facility]  
[address] [place] [address]  
Dr. Nikole Jacobs   
   
                                                    ALT [Catalytic   
activity/Vol]   31 U/L          Normal          14-59           [facility]  
   
                                        Comment on above:   Performed By: #### C  
MP ####  
[facility]  
[address] [place] [address]  
Dr. Nikole Jacobs   
   
                                                    Anion gap   
[Moles/Vol]     13.3 mmol/L     Normal                          [facility]  
   
                                        Comment on above:   Performed By: #### C  
MP ####  
[facility]  
[address] [place] [address]  
Dr. Nikole Jacobs   
   
                                                    AST [Catalytic   
activity/Vol]   20 U/L          Normal          15-37           [facility]  
   
                                        Comment on above:   Performed By: #### C  
MP ####  
[facility]  
[address] [place] [address]  
Dr. Nikole Jacobs   
   
                                                    Bilirubin   
[Mass/Vol]      0.5 mg/dL       Normal          0.2-1.0         [facility]  
   
                                        Comment on above:   Performed By: #### C  
MP ####  
[facility]  
[address] [place] [address]  
Dr. Nikole Jacobs   
   
                      Calcium [Mass/Vol] 9.2 mg/dL  Normal     8.5-10.1   [facility]  
   
                                        Comment on above:   Performed By: #### C  
MP ####  
[facility]  
[address] [place] [address]  
Dr. Nikole Jacobs   
   
                                                    Chloride   
[Moles/Vol]     101 mmol/L      Normal                    [facility]  
   
                                        Comment on above:   Performed By: #### C  
MP ####  
[facility]  
[address] [address]  
Dr. Nikole Jacobs   
   
                      CO2 [Moles/Vol] 27.9 mmol/L Normal     21.0-32.0  [facility]  
   
                                        Comment on above:   Performed By: #### C  
MP ####  
[facility]  
[address] [address]  
Dr. Nikole Jacobs   
   
                                                    Creatinine   
[Mass/Vol]      1.36 mg/dL      Critically high 0.55-1.02       [facility]  
   
                                        Comment on above:   Performed By: #### C  
MP ####  
[facility]  
[address] [address]  
Dr. Nikole Jacobs   
   
                      EGFR-AF AMERICAN 47 mL/min/1.73m2 Critically low >=60       
  [facility]  
   
                                        Comment on above:   Performed By: #### C  
MP ####  
[facility]  
[address] [address]  
Dr. Nikole Jacobs   
   
                                                    EGFR-NON AF   
AMERICAN        39 mL/min/1.73m2 Critically low  >=60            [facility]  
   
                                        Comment on above:   Performed By: #### C  
MP ####  
[facility]  
[address] [address]  
Dr. Nikole Jacobs   
   
                                                    Globulin (S)   
[Mass/Vol]      3.4 g/dL        Normal                          [facility]  
   
                                        Comment on above:   Performed By: #### C  
MP ####  
[facility]  
[address] [address]  
Dr. Nikole Jacobs   
   
                      Glucose [Mass/Vol] 120 mg/dL  Critically high      T  
[facility]  
   
                                        Comment on above:   Performed By: #### C  
MP ####  
[facility]  
[address] [address]  
Dr. Nikole Jacobs   
   
                                                    Potassium   
[Moles/Vol]     5.2 mmol/L      Critically high 3.5-5.1         [facility]  
   
                                        Comment on above:   Performed By: #### C  
MP ####  
[facility]  
[address] [address]  
Dr. Nikole Jacobs   
   
                      Protein [Mass/Vol] 6.7 g/dL   Normal     6.4-8.2    [facility]  
   
                                        Comment on above:   Performed By: #### C  
MP ####  
[facility]  
[address] [address]  
Dr. Nikole Jacobs   
   
                      Sodium [Moles/Vol] 137 mmol/L Normal     136-145    [facility]  
   
                                        Comment on above:   Performed By: #### C  
MP ####  
[facility]  
[address] [address]  
Dr. Nikole Jacobs   
   
                                                    Urea nitrogen   
[Mass/Vol]      32.0 mg/dL      Critically high 7.0-18.0        [facility]  
   
                                        Comment on above:   Performed By: #### C  
MP ####  
[facility]  
[address] [place] [address]  
Dr. Nikole Jacobs   
   
                                                    Urea   
nitrogen/Creatinine   
[Mass ratio]    23.5 mg/mg      Normal                          [facility]  
   
                                        Comment on above:   Performed By: #### C  
MP ####  
[facility]  
[address] [place] [address]  
Dr. Nikole Jacobs   
   
                                                    BNPon 12-   
   
                                                    Natriuretic peptide   
B (Bld) [Mass/Vol] 455.0 pg/mL     Normal          <=900.0         [facility]  
   
                                        Comment on above:   Performed By: #### C  
MP ####  
[facility]  
[address] [place] [address]  
Dr. Nikole Jacobs   
   
                                                    CARDIAC SUYAPA ADMITon 12-15-2  
022   
   
                                                    CK [Catalytic   
activity/Vol]   61 U/L          Normal                    [facility]  
   
                                        Comment on above:   Performed By: #### C  
MP ####  
[facility]  
[address] [place] [address]  
Dr. Nikole Jacobs   
   
                      CK.MB [Mass/Vol] 3.26 ng/mL Normal     <=3.60     [facility]  
   
                                        Comment on above:   Performed By: #### C  
MP ####  
[facility]  
[address] [place] [address]  
Dr. Nikole Jacobs   
   
                      HSTROP     27.9 pg/mL Normal     4.0-51.3   [facility]  
   
                                        Comment on above:   Result Comment: CUT-  
OFF POINTS HAVE BEEN ESTABLISHED BASED ON   
THE   
FOURTH UNIVERSAL DEFINITIONS OF MYOCARDIAL  
INFARCTION. THE UPPER REFERENCE LIMIT (URL) OF TROPONIN, DEFINED   
AS THE 99TH PERCENTILE OF  
cTnI DISTRIBUTION IN A REFERENCE POPULATION, HAS BEEN CONFIRMED AS   
THE DECISION THRESHOLD  
FOR MI DIAGNOSIS.   
   
                                                            Performed By: #### C  
MP ####  
[facility]  
[address] [id number]  
Dr. Nikole Jacobs   
   
                      YOLI        113 ng/mL  Critically high 9-82       [facility]  
   
                                        Comment on above:   Performed By: #### C  
MP ####  
[facility]  
[address] [id number]  
Dr. Nikole Jacobs   
   
                                                    CBC AUTO DIFFon 12-   
   
                      BASO #     0.1 103/ul Normal     0.0-0.1    [facility]  
   
                                        Comment on above:   Performed By: #### C  
BC ####  
[facility]  
[address] [address]  
Dr. Nikole Jacobs   
   
                                                    Basophils/100 WBC   
(Bld)           0.4 %           Normal          0.2-2.0         [facility]  
   
                                        Comment on above:   Performed By: #### C  
BC ####  
[facility]  
[address] [place] [address]  
Dr. Nikole Jacobs   
   
                      EO #       0.1 103/ul Normal     0.0-0.7    [facility]  
   
                                        Comment on above:   Performed By: #### C  
BC ####  
[facility]  
[address] [place] [address]  
Dr. Nikole Jacobs   
   
                                                    Eosinophils/100 WBC   
(Bld)           0.5 %           Critically low  0.9-7.0         [facility]  
   
                                        Comment on above:   Performed By: #### C  
BC ####  
[facility]  
[address] [place] [address]  
Dr. Nikole Jacobs   
   
                                                    Erythrocyte   
distribution width   
(RBC) [Ratio]   13.6 %          Normal          11.0-15.0       [facility]  
   
                                        Comment on above:   Performed By: #### C  
BC ####  
[facility]  
[address] [place] [address]  
Dr. Nikole Jacobs   
   
                                                    Hematocrit (Bld)   
[Volume fraction] 34.5 %          Critically low  36.0-48.0       [facility]  
   
                                        Comment on above:   Performed By: #### C  
BC ####  
[facility]  
[address] [place] [address]  
Dr. Nikole Jacobs   
   
                                                    Hemoglobin (Bld)   
[Mass/Vol]      11.5 g/dL       Critically low  12.0-16.0       [facility]  
   
                                        Comment on above:   Performed By: #### C  
BC ####  
[facility]  
[address] [address]  
Dr. Nikole Jacobs   
   
                      IG #       0.48 10e3/ul Critically high 0.00-0.03  [facility]  
   
                                        Comment on above:   Performed By: #### C  
BC ####  
[facility]  
[address] [address]  
Dr. Nikole Jacobs   
   
                      IG %       3.2 %      Critically high 0.0-0.5    [facility]  
   
                                        Comment on above:   Performed By: #### C  
BC ####  
[facility]  
[address] [address]  
Dr. Nikole Jacobs   
   
                      LYMPH #    1.8 103/ul Normal     1.2-3.8    [facility]  
   
                                        Comment on above:   Performed By: #### C  
BC ####  
[facility]  
[address] [place] [address]  
Dr. Nikole Jacobs   
   
                                                    Lymphocytes/100 WBC   
(Bld)           12.1 %          Critically low  20.5-60.0       [facility]  
   
                                        Comment on above:   Performed By: #### C  
BC ####  
[facility]  
[address] [place] [address]  
Dr. Nikole Jacobs   
   
                      MANUAL DIFF REQ NO         Normal                [facility]  
   
                                        Comment on above:   Performed By: #### C  
BC ####  
[facility]  
[address] [place] [address]  
Dr. Nikole Jacobs   
   
                                                    MCH (RBC) [Entitic   
mass]           31.9 pg         Normal          26.7-34.0       [facility]  
   
                                        Comment on above:   Performed By: #### C  
BC ####  
[facility]  
[address] [place] [address]  
Dr. Nikole Jacobs   
   
                                                    MCHC (RBC)   
[Mass/Vol]      33.3 g/dL       Normal          29.9-35.2       [facility]  
   
                                        Comment on above:   Performed By: #### C  
BC ####  
[facility]  
[address] [place] [address]  
Dr. Nikole Jacobs   
   
                                                    MCV (RBC) [Entitic   
vol]            95.6 fL         Normal          81.0-99.0       [facility]  
   
                                        Comment on above:   Performed By: #### C  
BC ####  
[facility]  
[address] [address]  
Dr. Nikole Jacobs   
   
                      MONO #     1.4 103/ul Critically high 0.3-0.8    [facility]  
   
                                        Comment on above:   Performed By: #### C  
BC ####  
[facility]  
[address] [place] [address]  
Dr. Nikole Jacobs   
   
                                                    Monocytes/100 WBC   
(Bld)           9.6 %           Normal          1.7-12.0        [facility]  
   
                                        Comment on above:   Performed By: #### C  
BC ####  
[facility]  
[address] [place] [address]  
Dr. Nikole Jacobs   
   
                      NEUT #     11.1 103/ul Critically high 1.4-6.5    [facility]  
   
                                        Comment on above:   Performed By: #### C  
BC ####  
[facility]  
[address] [place] [address]  
Dr. Nikole Jacobs   
   
                                                    Neutrophils/100 WBC   
(Bld)           74.2 %          Normal          43.0-75.0       [facility]  
   
                                        Comment on above:   Performed By: #### C  
BC ####  
[facility]  
[address] [place] [address]  
Dr. Nikole Jacobs   
   
                                                    Platelet mean   
volume (Bld)   
[Entitic vol]   9.4 fL          Critically low  9.5-13.5        [facility]  
   
                                        Comment on above:   Performed By: #### C  
BC ####  
[facility]  
[address] [place] [address]  
Dr. Nikole Jacobs   
   
                      PLT        415 103/ul Normal     150-450    [facility]  
   
                                        Comment on above:   Performed By: #### C  
BC ####  
[facility]  
[address] [place] [address]  
Dr. Nikole Jacobs   
   
                      RBC        3.61 106/ul Critically low 4.20-5.40  [facility]  
   
                                        Comment on above:   Performed By: #### C  
BC ####  
[facility]  
[address] [place] [address]  
Dr. Nikole Jacobs   
   
                      WBC        15.0 103/ul Critically high 4.0-11.0   [facility]  
   
                                        Comment on above:   Performed By: #### C  
BC ####  
[facility]  
[address] [place] [address]  
Dr. Nikole Jacobs   
   
                                                    FREE THYROXINE INDEX T7on 12  
-   
   
                      FTI        2.70       Normal     1.30-4.50  [facility]  
   
                                        Comment on above:   Performed By: #### C  
MP ####  
[facility]  
[address] [place] [address]  
Dr. Nikole Jacobs   
   
                      T3U        36.0 %     Normal     30.0-39.0  [facility]  
   
                                        Comment on above:   Performed By: #### C  
MP ####  
[facility]  
[address] [place] [address]  
Dr. Nikole Jacobs   
   
                      T4 [Mass/Vol] 7.50 ug/dL Normal     4.80-13.90 [facility]  
   
                                        Comment on above:   Performed By: #### C  
MP ####  
[facility]  
[address] [place] [address]  
Dr. Nikole Jacobs   
   
                                                    IRONon 12-   
   
                      Iron [Mass/Vol] 104.0 ug/dL Normal     50.0-170.0 [facility]  
   
                                        Comment on above:   Performed By: #### C  
MP ####  
[facility]  
[address] [place] [address]  
Dr. Nikole Jacobs   
   
                                                    PROF 14(COMP METB)on 12-15-2  
022   
   
                      Albumin [Mass/Vol] 3.3 g/dL   Critically low 3.4-5.0    [facility]  
   
                                        Comment on above:   Performed By: #### C  
MP ####  
[facility]  
[address] [place] [address]  
Dr. Nikole Jacobs   
   
                                                    Albumin/Globulin   
[Mass ratio]    1.0 {ratio}     Normal                          [facility]  
   
                                        Comment on above:   Performed By: #### C  
MP ####  
[facility]  
[address] [place] [address]  
Dr. Nikole Jacobs   
   
                                                    ALP [Catalytic   
activity/Vol]   69 U/L          Normal                    [facility]  
   
                                        Comment on above:   Performed By: #### C  
MP ####  
[facility]  
[address] [place] [address]  
Dr. Nikole Jacobs   
   
                                                    ALT [Catalytic   
activity/Vol]   36 U/L          Normal          14-59           [facility]  
   
                                        Comment on above:   Performed By: #### C  
MP ####  
[facility]  
[address] [address]  
Dr. Nikole Jacobs   
   
                                                    Anion gap   
[Moles/Vol]     13.6 mmol/L     Normal                          [facility]  
   
                                        Comment on above:   Performed By: #### C  
MP ####  
[facility]  
[address] [address]  
Dr. Nikole Jacobs   
   
                                                    AST [Catalytic   
activity/Vol]   16 U/L          Normal          15-37           [facility]  
   
                                        Comment on above:   Performed By: #### C  
MP ####  
[facility]  
[address] [address]  
Dr. Nikole Jacbos   
   
                                                    Bilirubin   
[Mass/Vol]      0.6 mg/dL       Normal          0.2-1.0         [facility]  
   
                                        Comment on above:   Performed By: #### C  
MP ####  
[facility]  
[address] [address]  
Dr. Nikole Jacobs   
   
                      Calcium [Mass/Vol] 9.1 mg/dL  Normal     8.5-10.1   [facility]  
   
                                        Comment on above:   Performed By: #### C  
MP ####  
[facility]  
[address] [place] [address]  
Dr. Nikole Jacobs   
   
                                                    Chloride   
[Moles/Vol]     103 mmol/L      Normal                    [facility]  
   
                                        Comment on above:   Performed By: #### C  
MP ####  
[facility]  
[address] [address]  
Dr. Nikole Jacobs   
   
                      CO2 [Moles/Vol] 27.4 mmol/L Normal     21.0-32.0  [facility]  
   
                                        Comment on above:   Performed By: #### C  
MP ####  
[facility]  
[address] [address]  
Dr. Nikole Jacobs   
   
                                                    Creatinine   
[Mass/Vol]      1.90 mg/dL      Critically high 0.55-1.02       [facility]  
   
                                        Comment on above:   Performed By: #### C  
MP ####  
[facility]  
[address] [address]  
Dr. Nikole Jacobs   
   
                      EGFR-AF AMERICAN 32 mL/min/1.73m2 Critically low >=60       
  [facility]  
   
                                        Comment on above:   Performed By: #### C  
MP ####  
[facility]  
[address] [place] [address]  
Dr. Nikole Jacobs   
   
                                                    EGFR-NON AF   
AMERICAN        27 mL/min/1.73m2 Critically low  >=60            [facility]  
   
                                        Comment on above:   Performed By: #### C  
MP ####  
[facility]  
[address] [address]  
Dr. Nikole Jacobs   
   
                                                    Globulin (S)   
[Mass/Vol]      3.4 g/dL        Normal                          [facility]  
   
                                        Comment on above:   Performed By: #### C  
MP ####  
[facility]  
[address] [address]  
Dr. Nikole Jacobs   
   
                      Glucose [Mass/Vol] 122 mg/dL  Critically high      T  
[facility]  
   
                                        Comment on above:   Performed By: #### C  
MP ####  
[facility]  
[address] [address]  
Dr. Nikole Jacobs   
   
                                                    Potassium   
[Moles/Vol]     5.0 mmol/L      Normal          3.5-5.1         [facility]  
   
                                        Comment on above:   Performed By: #### C  
MP ####  
[facility]  
[address] [address]  
Dr. Nikole Jacobs   
   
                      Protein [Mass/Vol] 6.7 g/dL   Normal     6.4-8.2    [facility]  
   
                                        Comment on above:   Performed By: #### C  
MP ####  
[facility]  
[address] [address]  
Dr. Nikole Jacobs   
   
                      Sodium [Moles/Vol] 139 mmol/L Normal     136-145    [facility]  
   
                                        Comment on above:   Performed By: #### C  
MP ####  
[facility]  
[address] [address]  
Dr. Nikole Jacobs   
   
                                                    Urea nitrogen   
[Mass/Vol]      55.0 mg/dL      Critically high 7.0-18.0        [facility]  
   
                                        Comment on above:   Performed By: #### C  
MP ####  
[facility]  
[address] [address]  
Dr. Nikole Jacobs   
   
                                                    Urea   
nitrogen/Creatinine   
[Mass ratio]    28.9 mg/mg      Normal                          [facility]  
   
                                        Comment on above:   Performed By: #### C  
MP ####  
[facility]  
[address] [address]  
Dr. Nikole Jacobs   
   
                                                    TSHon 12-   
   
                      TSH        1.585 uIU/mL Normal     0.358-3.740 [facility]  
   
                                        Comment on above:   Performed By: #### C  
MP ####  
[facility]  
[address] [place], [place] [address]  
Dr. Nikole Jacobs   
   
                                                    SARS-CoV-2on 2022   
   
                                                    SARS-CoV-2   
(COVID-19) RNA   
ROMI+probe Ql (Unsp   
spec)                           Normal                          [facility]  
   
                                        Comment on above:   Performed By: #### C  
OVID ####  
[facility]  
[address], [place] [address] [phone]  
: Silvio Nicole MD  
[facility]  
[address] [place] [address] [phone]  
: Marv Obregon MD   
   
                                                    SARS-CoV-2   
(COVID-19) RNA   
ROMI+probe Ql (Unsp   
spec)           Not detected    Normal          NOTDET          [facility]  
   
                                        Comment on above:   Result Comment:  
The specimen is NEGATIVE for SARS-CoV-2, the novel coronavirus   
associated with  
COVID-19.  
A negative result does not rule out COVID-19.  
Christine SARS-CoV-2 for use on the [employer] is a   
real-time RT-PCR  
test intended for the qualitative detection of nucleic acids from   
SARS-CoV-2  
in clinician-collected nasal, nasopharyngeal, and oropharyngeal   
swab specimens  
from individuals who meet COVID-19 clinical and/or epidemiological   
criteria.  
Christine SARS-CoV-2 is for use only under Emergency Use Authorization   
(EUA) in  
laboratories certified under Clinical Laboratory Improvement   
Amendments of 1988  
(CLIA), 42 U.S.C. ?263a, that meet requirements to perform high or   
moderate  
complexity tests.  
An individual without symptoms of COVID-19 and who is not shedding   
SARS-CoV-2  
virus would expect to have a negative (not detected) result in   
this assay.  
Fact sheet for Healthcare Providers:   
https://www.fda.gov/media/507575/download  
Fact sheet for Patients: https://www.fda.gov/media/344826/download  
METHODOLOGY: RT-PCR   
   
                                                            Performed By: #### C  
OVID ####  
[facility]  
[address], [place] [id number] [phone]  
: Silvio Nicole MD  
[facility]  
[address] [place] [address] [phone]  
: MD BLU Guerra-CoV-2on 2022   
   
                                                    SARS-CoV-2   
(COVID-19) RNA   
ROMI+probe Ql (Unsp   
spec)           .NASOPHARYNGEAL SWAB Normal                          [facility]  
   
                                        Comment on above:   Performed By: #### C  
OVID ####  
[address] [address]  
[phone]  
: Silvio Nicole MD  
[facility]  
[address] [place] [address] [phone]  
: MD BLU Guerra-CoV-2on 2022   
   
                                                    SARS-CoV-2   
(COVID-19) RNA   
ROMI+probe Ql (Unsp   
spec)                           Normal                          [facility]  
   
                                        Comment on above:   Performed By: #### C  
OVID ####  
[address] [address]  
[phone]  
: Silvio Nicole MD  
[facility]  
[address] [place] [address] [phone]  
: Marv Obregon MD   
   
                                                    SARS-CoV-2   
(COVID-19) RNA   
ROMI+probe Ql (Unsp   
spec)           Not detected    Normal          [facility]          [facility]  
   
                                        Comment on above:   Result Comment:  
The specimen is NEGATIVE for SARS-CoV-2, the novel coronavirus   
associated with  
COVID-19.  
A negative result does not rule out COVID-19.  
Christine SARS-CoV-2 for use on the [employer] is a   
real-time RT-PCR  
test intended for the qualitative detection of nucleic acids from   
SARS-CoV-2  
in clinician-collected nasal, nasopharyngeal, and oropharyngeal   
swab specimens  
from individuals who meet COVID-19 clinical and/or epidemiological   
criteria.  
Christine SARS-CoV-2 is for use only under Emergency Use Authorization   
(EUA) in  
laboratories certified under Clinical Laboratory Improvement   
Amendments of 1988  
(CLIA), 42 U.S.C. ?263a, that meet requirements to perform high or   
moderate  
complexity tests.  
An individual without symptoms of COVID-19 and who is not shedding   
SARS-CoV-2  
virus would expect to have a negative (not detected) result in   
this assay.  
Fact sheet for Healthcare Providers:   
https://www.fda.gov/media/664822/download  
Fact sheet for Patients: https://www.fda.gov/media/149265/download  
METHODOLOGY: RT-PCR   
   
                                                            Performed By: #### C  
OVID ####  
[address] [address]  
[phone]  
: Silvio Nicole MD  
[facility]  
[address] [place] [address]   
[place] [address] [phone]  
: Marv Obregon MD   
   
                                                    SARS-CoV-2on 2022   
   
                                                    SARS-CoV-2   
(COVID-19) RNA   
ROMI+probe Ql (Unsp   
spec)           .NASOPHARYNGEAL SWAB Normal                          [facility]  
   
                                        Comment on above:   Performed By: #### C  
OVID ####  
[address] [address]  
[phone]  
: Silvio Nicole MD  
[facility]  
[address] [place] [address]   
[place] [address] [phone]  
: Marv Obregon MD   
   
                                                    CBC with Diffon 2022   
   
                      Abs. Basophil 0.05 k/uL  Normal     0.00-0.20  [facility]  
   
                                        Comment on above:   Performed By: #### C  
DP, CP, LIPRF, TSH, FT3, VD25, T4, GLYHGB   
####  
[address] [address]  
[phone]  
: Silvio Nicole MD   
   
                      Abs.Imm.Granulocyte 0.08 k/uL  Normal     0.00-0.30  [facility]  
   
                                        Comment on above:   Performed By: #### C  
DP, CP, LIPRF, TSH, FT3, VD25, T4, GLYHGB   
####  
[address] [address]  
[phone]  
: Silvio Nicole MD   
   
                                                    Abs.Neutrophil   
(Seg)           11.43 k/uL      High            1.50-8.10       [facility]  
   
                                        Comment on above:   Performed By: #### C  
DP, CP, LIPRF, TSH, FT3, VD25, T4, GLYHGB   
####  
[address] [address]  
[phone]  
: Silvio Nicole MD   
   
                                                    Basophils/100 WBC   
(Bld)           0 %             Normal          0-2             [facility]  
   
                                        Comment on above:   Performed By: #### C  
DP, CP, LIPRF, TSH, FT3, VD25, T4, GLYHGB   
####  
[place] [address]  
[phone]  
: Silvio Nicole MD   
   
                                                    Eosinophils (Bld)   
[#/Vol]         0.05 10*3/uL    Normal          0.00-0.44       [facility]  
   
                                        Comment on above:   Performed By: #### C  
DP, CP, LIPRF, TSH, FT3, VD25, T4, GLYHGB   
####  
[place] [address]  
[phone]  
: Silvio Nicole MD   
   
                                                    Eosinophils/100 WBC   
(Bld)           0 %             Low             1-4             [facility]  
   
                                        Comment on above:   Performed By: #### C  
DP, CP, LIPRF, TSH, FT3, VD25, T4, GLYHGB   
####  
[place] [address]  
[phone]  
: Silvio Nicole MD   
   
                                                    Erythrocyte   
distribution width   
(RBC) [Ratio]   13.1 %          Normal          11.8-14.4       [facility]  
   
                                        Comment on above:   Performed By: #### C  
DP, CP, LIPRF, TSH, FT3, VD25, T4, GLYHGB   
####  
[place] [address]  
[phone]  
: Silvio Nicole MD   
   
                                                    Hematocrit (Bld)   
[Volume fraction] 39.4 %          Normal          36.3-47.1       [facility]  
   
                                        Comment on above:   Performed By: #### C  
DP, CP, LIPRF, TSH, FT3, VD25, T4, GLYHGB   
####  
[address] [address]  
[phone]  
: Silvio Nicole MD   
   
                                                    Hemoglobin (Bld)   
[Mass/Vol]      12.7 g/dL       Normal          11.9-15.1       [facility]  
   
                                        Comment on above:   Performed By: #### C  
DP, CP, LIPRF, TSH, FT3, VD25, T4, GLYHGB   
####  
[address] [address]  
[phone]  
: Silvio Nicole MD   
   
                                                    Immature   
granulocytes/100   
WBC (Bld)       1 %             High            0               [facility]  
   
                                        Comment on above:   Performed By: #### C  
DP, CP, LIPRF, TSH, FT3, VD25, T4, GLYHGB   
####  
[place] [address]  
[phone]  
: Silvio Nicole MD   
   
                                                    Lymphocytes (Bld)   
[#/Vol]         2.24 10*3/uL    Normal          1.10-3.70       [facility]  
   
                                        Comment on above:   Performed By: #### C  
DP, CP, LIPRF, TSH, FT3, VD25, T4, GLYHGB   
####  
[address] [address]  
[phone]  
: Silvio Nicole MD   
   
                                                    Lymphocytes/100 WBC   
(Bld)           15 %            Low             24-43           [facility]  
   
                                        Comment on above:   Performed By: #### C  
DP, CP, LIPRF, TSH, FT3, VD25, T4, GLYHGB   
####  
[place] [address]  
[phone]  
: Silvio Nicole MD   
   
                                                    MCH (RBC) [Entitic   
mass]           31.4 pg         Normal          25.2-33.5       [facility]  
   
                                        Comment on above:   Performed By: #### C  
DP, CP, LIPRF, TSH, FT3, VD25, T4, GLYHGB   
####  
[address] [address]  
[phone]  
: Silvio Nicole MD   
   
                                                    MCHC (RBC)   
[Mass/Vol]      32.2 g/dL       Normal          28.4-34.8       [facility]  
   
                                        Comment on above:   Performed By: #### C  
DP, CP, LIPRF, TSH, FT3, VD25, T4, GLYHGB   
####  
[address] [address]  
[phone]  
: Silvio Nicole MD   
   
                                                    MCV (RBC) [Entitic   
vol]            97.3 fL         Normal          82.6-102.9      [facility]  
   
                                        Comment on above:   Performed By: #### C  
DP, CP, LIPRF, TSH, FT3, VD25, T4, GLYHGB   
####  
[address] [address]  
[phone]  
: Silvio Nicole MD   
   
                                                    Monocytes (Bld)   
[#/Vol]         1.32 10*3/uL    High            0.10-1.20       [facility]  
   
                                        Comment on above:   Performed By: #### C  
DP, CP, LIPRF, TSH, FT3, VD25, T4, GLYHGB   
####  
[address] [address]  
[phone]  
: Silvio Nicole MD   
   
                                                    Monocytes/100 WBC   
(Bld)           9 %             Normal          3-12            [facility]  
   
                                        Comment on above:   Performed By: #### C  
DP, CP, LIPRF, TSH, FT3, VD25, T4, GLYHGB   
####  
[address] [address]  
[phone]  
: Silvio Nicole MD   
   
                      Neutrophil (Seg) 75 %       High       36-65      [facility]  
   
                                        Comment on above:   Performed By: #### C  
DP, CP, LIPRF, TSH, FT3, VD25, T4, GLYHGB   
####  
[address] [address]  
[phone]  
: Silvio Nicole MD   
   
                      NRBC Automated 0.0 per 100 WBC Normal     0.0        [facility]  
   
                                        Comment on above:   Performed By: #### C  
DP, CP, LIPRF, TSH, FT3, VD25, T4, GLYHGB   
####  
[address] [address]  
[phone]  
: Silvio Nicole MD   
   
                                                    Platelet mean   
volume (Bld)   
[Entitic vol]   10.1 fL         Normal          8.1-13.5        [facility]  
   
                                        Comment on above:   Performed By: #### C  
DP, CP, LIPRF, TSH, FT3, VD25, T4, GLYHGB   
####  
[address] [address]  
[phone]  
: Silvio Nicole MD   
   
                                                    Platelets (Bld)   
[#/Vol]         411 10*3/uL     Normal          138-453         [facility]  
   
                                        Comment on above:   Performed By: #### C  
DP, CP, LIPRF, TSH, FT3, VD25, T4, GLYHGB   
####  
[address] [address]  
[phone]  
: Silvio Nicole MD   
   
                      RBC (Bld) [#/Vol] 4.05 10*6/uL Normal     3.95-5.11  [facility]  
   
                                        Comment on above:   Performed By: #### C  
DP, CP, LIPRF, TSH, FT3, VD25, T4, GLYHGB   
####  
[address] [address]  
[phone]  
: Silvio Nicole MD   
   
                      WBC (Bld) [#/Vol] 15.2 10*3/uL High       3.5-11.3   [facility]  
   
                                        Comment on above:   Performed By: #### C  
DP, CP, LIPRF, TSH, FT3, VD25, T4, GLYHGB   
####  
[address] [address]  
[phone]  
: Silvio Nicole MD   
   
                                                    Comp Metabolic Profon 2022   
   
                      (cont.)               Normal                [facility]  
   
                                        Comment on above:   Result Comment: Aver  
age GFR for 60-69 years old:  
85 mL/min/1.73sq m  
Chronic Kidney Disease:  
<60 mL/min/1.73sq m  
Kidney failure:  
<15 mL/min/1.73sq m  
eGFR calculated using average adult body mass. Additional eGFR   
calculator  
available at:  
http://www.[employer]/multiple_crcl_2012.htm   
   
                                                            Performed By: #### C  
DP, CP, LIPRF, TSH, FT3, VD25, T4, GLYHGB ####  
[address] [address]  
[phone]  
: Silvio Nicoel MD   
   
                      Albumin [Mass/Vol] 4.8 g/dL   Normal     3.5-5.2    [facility]  
   
                                        Comment on above:   Performed By: #### C  
DP, CP, LIPRF, TSH, FT3, VD25, T4, GLYHGB   
####  
[address] [address]  
[phone]  
: Sivlio Nicole MD   
   
                      Albumin/Glob Ratio 1.9        Normal     1.0-2.5    [facility]  
   
                                        Comment on above:   Performed By: #### C  
DP, CP, LIPRF, TSH, FT3, VD25, T4, GLYHGB   
####  
[address] [address]  
[phone]  
: Silvio Nicole MD   
   
                      Alkaline Phos 91 U/L     Normal          [facility]  
   
                                        Comment on above:   Performed By: #### C  
DP, CP, LIPRF, TSH, FT3, VD25, T4, GLYHGB   
####  
[address] [address]  
[phone]  
: Silvio Nicole MD   
   
                                                    ALT [Catalytic   
activity/Vol]   17 U/L          Normal          5-33            [facility]  
   
                                        Comment on above:   Performed By: #### C  
DP, CP, LIPRF, TSH, FT3, VD25, T4, GLYHGB   
####  
[facility]  
[address] [place] [address]  
[phone]  
: Silvio Nicole MD   
   
                                                    Anion gap   
[Moles/Vol]     19 mmol/L       High            9-17            [facility]  
   
                                        Comment on above:   Performed By: #### C  
DP, CP, LIPRF, TSH, FT3, VD25, T4, GLYHGB   
####  
[facility]  
[address] [place] [address]  
[phone]  
: Silvio Nicole MD   
   
                                                    AST [Catalytic   
activity/Vol]   17 U/L          Normal          <32             [facility]  
   
                                        Comment on above:   Performed By: #### C  
DP, CP, LIPRF, TSH, FT3, VD25, T4, GLYHGB   
####  
[facility]  
[address] [place] [address]  
[phone]  
: Silvio Nicole MD   
   
                                                    Bilirubin   
[Mass/Vol]      0.27 mg/dL      Low             0.3-1.2         [facility]  
   
                                        Comment on above:   Performed By: #### C  
DP, CP, LIPRF, TSH, FT3, VD25, T4, GLYHGB   
####  
[facility]  
[address] [place] [address]  
[phone]  
: Silvio Nicole MD   
   
                      Calcium [Mass/Vol] 9.6 mg/dL  Normal     8.6-10.4   [facility]  
   
                                        Comment on above:   Performed By: #### C  
DP, CP, LIPRF, TSH, FT3, VD25, T4, GLYHGB   
####  
[facility]  
[address] [place] [address]  
[phone]  
: Silvio Nicole MD   
   
                                                    Chloride   
[Moles/Vol]     96 mmol/L       Low                       [facility]  
   
                                        Comment on above:   Performed By: #### C  
DP, CP, LIPRF, TSH, FT3, VD25, T4, GLYHGB   
####  
[facility]  
[address] [place] [address]  
[phone]  
: Silvio Nicole MD   
   
                      CO2 [Moles/Vol] 20 mmol/L  Normal     20-31      [facility]  
   
                                        Comment on above:   Performed By: #### C  
DP, CP, LIPRF, TSH, FT3, VD25, T4, GLYHGB   
####  
[address] [address]  
[phone]  
: Silvio Nicole MD   
   
                                                    Creatinine   
[Mass/Vol]      1.06 mg/dL      High            0.50-0.90       [facility]  
   
                                        Comment on above:   Performed By: #### C  
DP, CP, LIPRF, TSH, FT3, VD25, T4, GLYHGB   
####  
[address] [address]  
[phone]  
: Silvio Nicole MD   
   
                      GFR, Amer >60        Normal     >60        [facility]  
   
                                        Comment on above:   Performed By: #### C  
DP, CP, LIPRF, TSH, FT3, VD25, T4, GLYHGB   
####  
[address] [address]  
[phone]  
: Silvio Nicole MD   
   
                                                    GFR,non    
Amer            52 mL/min       Low             >60             [facility]  
   
                                        Comment on above:   Performed By: #### C  
DP, CP, LIPRF, TSH, FT3, VD25, T4, GLYHGB   
####  
[address] [address]  
[phone]  
: Silvio Nicole MD   
   
                      Glucose [Mass/Vol] 101 mg/dL  High       70-99      [facility]  
   
                                        Comment on above:   Performed By: #### C  
DP, CP, LIPRF, TSH, FT3, VD25, T4, GLYHGB   
####  
[address] [address]  
[phone]  
: Silvio Nicole MD   
   
                                                    Potassium   
[Moles/Vol]     4.4 mmol/L      Normal          3.7-5.3         [facility]  
   
                                        Comment on above:   Performed By: #### C  
DP, CP, LIPRF, TSH, FT3, VD25, T4, GLYHGB   
####  
[facility]  
[address] [place] [address]  
[phone]  
: Silvio Nicole MD   
   
                      Protein [Mass/Vol] 7.3 g/dL   Normal     6.4-8.3    [facility]  
   
                                        Comment on above:   Performed By: #### C  
DP, CP, LIPRF, TSH, FT3, VD25, T4, GLYHGB   
####  
[facility]  
[address] [place] [address]  
[phone]  
: Silvio Nicole MD   
   
                      Sodium [Moles/Vol] 135 mmol/L Normal     135-144    [facility]  
   
                                        Comment on above:   Performed By: #### C  
DP, CP, LIPRF, TSH, FT3, VD25, T4, GLYHGB   
####  
[facility]  
[address] [place] [id number] [phone]  
: Silvio Nicole MD   
   
                                                    Urea nitrogen   
[Mass/Vol]      27 mg/dL        High            8-23            [facility]  
   
                                        Comment on above:   Performed By: #### C  
DP, CP, LIPRF, TSH, FT3, VD25, T4, GLYHGB   
####  
[facility]  
[address] [place] [address]  
[phone]  
: Silvio Nicole MD   
   
                                                    Comprehensive Metabolic Pane  
[facility] 2022   
   
                          Albumin [Mass/Vol] 4.8 g/dL                  3.5 - 5.2  
   
g/dL                                    [facility]  
   
                                                    Albumin/Globulin   
[Mass ratio]    1.9 {ratio}                                     [facility]  
   
                                                    ALP (Bld)   
[Catalytic   
activity/Vol]   91 U/L                          35 - 104 U/L    [facility]  
   
                                                    ALT [Catalytic   
activity/Vol]   17 U/L                          5 - 33 U/L      [facility]  
   
                                                    Anion gap   
[Moles/Vol]     19 mmol/L       High            9 - 17 mmol/L   [facility]  
   
                                                    AST [Catalytic   
activity/Vol]   17 U/L                          <32             [facility]  
   
                                                    Bilirubin   
[Mass/Vol]          0.27 mg/dL          Low                 0.3 - 1.2   
mg/dL                                   [facility]  
   
                          Calcium [Mass/Vol] 9.6 mg/dL                 8.6 - 10.  
4   
mg/dL                                   [facility]  
   
                                                    Chloride   
[Moles/Vol]         96 mmol/L           Low                 98 - 107   
mmol/L                                  [facility]  
   
                          CO2 [Moles/Vol] 20 mmol/L                 20 - 31   
mmol/L                                  [facility]  
   
                                                    Creatinine   
[Mass/Vol]          1.06 mg/dL          High                0.50 - 0.90   
mg/dL                                   [facility]  
   
                                                    Free PSA/Total PSA   
[Mass fraction]     7.3 g/dL                                6.4 - 8.3   
g/dL                                    [facility]  
   
                                                    GFR    
American        >60                             >60 mL/min      [facility]  
   
                                                    GFR Non-   
American        52 mL/min       Low             >60             [facility]  
   
                                                    GFR/1.73 sq   
M.predicted MDRD   
(S/P/Bld) [Vol   
rate/Area]                                                      [facility]  
   
                                        Comment on above:   Average GFR for 60-6  
9 years old:  
85 mL/min/1.73sq m  
Chronic Kidney Disease:  
<60 mL/min/1.73sq m  
Kidney failure:  
<15 mL/min/1.73sq m  
  
  
eGFR calculated using average adult body mass. Additional eGFR   
calculator available at:  
  
http://www.[employer]/multiple_crcl_.htm  
  
  
   
   
                      Glucose [Mass/Vol] 101 mg/dL  High       70 - 99 mg/dL [facility]  
   
                                                    Interpretation and   
review of   
laboratory results Abnormal                                        [facility]  
   
                                                    Potassium   
[Moles/Vol]         4.4 mmol/L                              3.7 - 5.3   
mmol/L                                  [facility]  
   
                          Sodium [Moles/Vol] 135 mmol/L                135 - 144  
   
mmol/L                                  [facility]  
   
                                                    Urea nitrogen   
(BldV) [Mass/Vol] 27 mg/dL        High            8 - 23 mg/dL    [facility]  
   
                                                    Hemoglobin A1Con 2022   
   
                      Glucose [Mass/Vol] 146 mg/dL  Normal                [facility]  
   
                                        Comment on above:   Result Comment: The   
ADA and AACC recommend providing the   
estimated   
average glucose result to  
permit better patient understanding of their HBA1c result.   
   
                                                            Performed By: #### C  
DP, CP, LIPRF, TSH, FT3, VD25, T4, GLYHGB ####  
[facility]  
[address] [place] [address] [phone]  
: Silvio Nicole MD   
   
                                                    HbA1c (Bld) [Mass   
fraction]       6.7 %           High            4.0-6.0         [facility]  
   
                                        Comment on above:   Performed By: #### C  
DP, CP, LIPRF, TSH, FT3, VD25, T4, GLYHGB   
####  
[address] [id number] [phone]  
: Silvio Nicole MD   
   
                                                    Lipid Prof, Fastingon 2022   
   
                                                    Cholesterol   
[Mass/Vol]      193 mg/dL       Normal          <200            [facility]  
   
                                        Comment on above:   Result Comment:  
Cholesterol Guidelines:  
<200 Desirable  
200-240 Borderline  
>240 Undesirable   
   
                                                            Performed By: #### C  
DP, CP, LIPRF, TSH, FT3, VD25, T4, GLYHGB ####  
[address] [address] [phone]  
: Silvio Nicole MD   
   
                                                    Cholesterol in HDL   
[Mass/Vol]      57 mg/dL        Normal          >40             [facility]  
   
                                        Comment on above:   Result Comment:  
HDL Guidelines:  
<40 Undesirable  
40-59 Borderline  
>59 Desirable   
   
                                                            Performed By: #### C  
DP, CP, LIPRF, TSH, FT3, VD25, T4, GLYHGB ####  
[address] [id number] [phone]  
: Silvio Nicole MD   
   
                                                    Cholesterol in LDL   
[Mass/Vol]      116 mg/dL       Normal          0-130           [facility]  
   
                                        Comment on above:   Result Comment:  
LDL Guidelines:  
<100 Desirable  
100-129 Near to/above Desirable  
130-159 Borderline  
>159 Undesirable  
Direct (measured) LDL and calculated LDL are not interchangeable   
tests.   
   
                                                            Performed By: #### C  
DP, CP, LIPRF, TSH, FT3, VD25, T4, GLYHGB ####  
[address] [id number] [phone]  
: Silvio Nicole MD   
   
                                                    Cholesterol.total/C  
holesterol in HDL   
[Mass ratio]    3.4 {ratio}     Normal          <5              [facility]  
   
                                        Comment on above:   Performed By: #### C  
DP, CP, LIPRF, TSH, FT3, VD25, T4, GLYHGB   
####  
[facility]  
[address] [place], [place] [id number] [phone]  
: Silvio Nicole MD   
   
                                                    Triglyceride,Fastin  
g               100 mg/dL       Normal          <150            [facility]  
   
                                        Comment on above:   Result Comment:  
Triglyceride Guidelines:  
<150 Desirable  
150-199 Borderline  
200-499 High  
>499 Very high  
Based on AHA Guidelines for fasting triglyceride, 2012.   
   
                                                            Performed By: #### C  
DP, CP, LIPRF, TSH, FT3, VD25, T4, GLYHGB ####  
[facility]  
[address] [place], [place] [id number] [phone]  
: Silvio Nicole MD   
   
                                                    Lipid, Fastingon 2022   
   
                                                    Cholesterol   
[Mass/Vol]      193 mg/dL                       <200            [facility]  
   
                                        Comment on above:     
Cholesterol Guidelines:  
<200 Desirable  
200-240 Borderline  
>240 Undesirable  
  
  
   
   
                                                    Cholesterol in HDL   
[Mass/Vol]      57 mg/dL                        >40             [facility]  
   
                                        Comment on above:     
HDL Guidelines:  
<40 Undesirable  
40-59 Borderline  
>59 Desirable  
  
  
   
   
                                                    Cholesterol in LDL   
[Mass/Vol]      116 mg/dL                       0 - 130 mg/dL   [facility]  
   
                                        Comment on above:     
LDL Guidelines:  
<100 Desirable  
100-129 Near to/above Desirable  
130-159 Borderline  
>159 Undesirable  
  
Direct (measured) LDL and calculated LDL are not interchangeable   
tests.  
  
   
   
                                                    Cholesterol.total/C  
holesterol in HDL   
[Mass ratio]    3.4 {ratio}                     <5              [facility]  
   
                                                    Triglyceride,   
Fasting         100 mg/dL                       <150            [facility]  
   
                                        Comment on above:     
Triglyceride Guidelines:  
<150 Desirable  
150-199 Borderline  
200-499 High  
>499 Very high  
Based on AHA Guidelines for fasting triglyceride, 2012.  
  
  
   
   
                                                    No Panel Informationon    
   
                                                                  [facility]  
   
                                                    T3, Freeon 2022   
   
                      Free T3 [Mass/Vol] 4.17 pg/mL Normal     2.02-4.43  [facility]  
   
                                        Comment on above:   Performed By: #### C  
DP, CP, LIPRF, TSH, FT3, VD25, T4, GLYHGB   
####  
[facility]  
[facility] [place], [place] [id number] [phone]  
: Silvio Nicole MD   
   
                                                    TSHon 2022   
   
                      TSH Qn     3.06 m[IU]/L                       [facility]  
   
                                                    Thyroid Stim. Horm.on 2022   
   
                      Thyroid Stim. Horm. 3.06 uIU/mL Normal     0.30-5.00  [facility]  
   
                                        Comment on above:   Performed By: #### C  
DP, CP, LIPRF, TSH, FT3, VD25, T4, GLYHGB   
####  
[facility]  
[address] [place] [id number] [phone]  
: Silvio Nicole MD   
   
                                                    Thyroxine T4on 2022   
   
                      T4 [Mass/Vol] 6.5 ug/dL  Normal     4.5-10.9   [facility]  
   
                                        Comment on above:   Performed By: #### C  
DP, CP, LIPRF, TSH, FT3, VD25, T4, GLYHGB   
####  
[address] [id number] [phone]  
: Silvio Nicole MD   
   
                                                    Vitamin D 25 [place]on 2022  
   
   
                      Vitamin D 25 [place] 57.6 ng/mL Normal     >29.9      [facility]  
   
                                        Comment on above:   Result Comment:  
Reference Range:  
Vitamin D status Range  
Deficiency <20 ng/mL  
Mild Deficiency 20-30 ng/mL  
Sufficiency  ng/mL  
Toxicity >100 ng/mL   
   
                                                            Performed By: #### C  
DP, CP, LIPRF, TSH, FT3, VD25, T4, GLYHGB ####  
[address] [id number] [phone]  
: Silvio Nicole MD   
   
                                                    CBC with Auto Differentialon  
 2022   
   
                      Absolute Eos # 0.05                             [facility]  
th  
   
                                                    Absolute Immature   
Granulocyte     0.08                                            [facility]  
   
                      Absolute Lymph # 2.24                             [facility]  
alth  
   
                      Absolute Mono # 1.32       High                  [facility]  
lt  
   
                                                    Basophils (Bld)   
[#/Vol]         0.05 10*3/uL                                    [facility]  
   
                                                    Basophils/100 WBC   
(Bld)           0 %                             0 - 2 %         [facility]  
   
                                                    Eosinophils/100 WBC   
(Bld)           0 %             Low             1 - 4 %         [facility]  
   
                                                    Hematocrit (Bld)   
[Volume fraction] 39.4 %                          36.3 - 47.1 %   [facility]  
   
                                                    Hemoglobin.gastroin  
testinal spec 1 Ql   
(Stl)               12.7 g/dL                               11.9 - 15.1   
g/dL                                    [facility]  
   
                                                    Immature   
granulocytes/100   
WBC (Bld)       1 %             High            0               [facility]  
   
                                                    Interpretation and   
review of   
laboratory results Abnormal                                        [facility]  
   
                                                    Lymphocytes/100 WBC   
(Bld)           15 %            Low             24 - 43 %       [facility]  
   
                                                    MCH (RBC) [Entitic   
mass]               31.4 pg                                 25.2 - 33.5   
pg                                      [facility]  
   
                                                    MCHC (RBC)   
[Mass/Vol]          32.2 g/dL                               28.4 - 34.8   
g/dL                                    [facility]  
   
                                                    MCV (RBC) [Entitic   
vol]                97.3 fL                                 82.6 - 102.9   
fL                                      [facility]  
   
                                                    Monocytes/100 WBC   
(Bld)           9 %                             3 - 12 %        [facility]  
   
                          NRBC Automated 0.0                       0.0 per 100   
WBC                                     [facility]  
   
                                                    Platelet   
distribution width   
(Bld) [Ratio]   13.1 %                          11.8 - 14.4 %   [facility]  
   
                                                    Platelet mean   
volume (Bld)   
[Entitic vol]   10.1 fL                         8.1 - 13.5 fL   [facility]  
   
                                                    Platelets (Bld)   
[#/Vol]         411 10*3/uL                                     [facility]  
   
                          RBC (Bld) [#/Vol] 4.05 10*6/uL              3.95 - 5.1  
1   
m/uL                                    [facility]  
   
                                                    Segmented   
neutrophils/100 WBC   
(Bld)           75 %            High            36 - 65 %       [facility]  
   
                      Segs Absolute 11.43      High                  [facility]  
h  
   
                      WBC (Bld) [#/Vol] 15.2 10*3/uL [facility][note 2]  
  
  
  
Vital Signs  
  
  
                          Date Time    Vital Sign   Value        Performing   
Clinician                               Facility  
   
                                                    02-   
14:      Body height     158.75 cm       [employer]   
[facility]  
Work Phone:   
[phone]  
   
                                                    02-   
14:                              Body mass index (BMI)   
[Ratio]             37.08 kg/m2         [facility]   
[facility]  
Work Phone:   
[phone]  
   
                                                    02-   
14:                              Body surface area   
Derived from formula 2.03 m2             [employer]   
[facility]  
Work Phone:   
[phone]  
   
                                                    02-   
14:      Body weight     93.44 kg        [facility] [employer]  
Work Phone:   
[phone]  
   
                                                    02-   
14:                              Diastolic blood   
pressure            64 mm[Hg]           [facility]     [employer]  
Work Phone:   
[phone]  
   
                                                    02-   
14:      Heart rate      80 /min         [facility]  
Work Phone:   
[phone]  
   
                                                    02-   
14:                              Systolic blood   
pressure            122 mm[Hg]          [facility]  
Work Phone:   
[phone]  
   
                                                    2024   
15:                              Blood Pressure   
Location                                            [facility]  
Work Phone:   
[phone]                           General Surgery   
[facility]  
   
                                                    2024   
15:                              Diastolic blood   
pressure                  86 mm[Hg]                 [facility]  
Work Phone:   
[phone]                           General Surgery   
[facility]  
   
                                                    2024   
15:          Heart rate          72 /min             [facility]  
Work Phone:   
[phone]                           General Surgery   
[facility]  
   
                                                    2024   
15:          Respiratory rate    16 /min             [facility]  
Work Phone:   
[phone]                           General Surgery   
[facility]  
   
                                                    2024   
15:                              Systolic blood   
pressure                  130 mm[Hg]                [facility]  
Work Phone:   
[phone]                           General Surgery   
[facility]  
  
  
  
Encounters  
  
  
                          Encounter Date Encounter Type Care Provider Facility  
   
                          Start: 2024 ambulatory   Oh R DESTINIL Facility  
: [facility]  
   
                                Start: 02- Split Srvc      [facility]  
rne  
Other Phone:   
[phone]                           [facility]  
   
                                                    Start: 2024  
End: 02-     ambulatory          Oh R DESTINIL      Facility: [facility]  
   
                                                    Start: 2024  
End: 2024                         Patient encounter   
procedure                               [facility]  
Work Phone:   
[phone]                           General Surgery   
[facility]  
Work Phone:   
[phone]  
   
                                                    Start: 2024  
End: 2024           ambulatory                MD Maki Veliz  
Work Phone:   
[phone]                          [facility]  
Work Phone:   
[phone]  
   
                                                    Start: 2024  
End: 2024           Departed Referred         MD Maki Veliz  
Work Phone:   
[phone]                          [facility]   
[facility]  
   
                                                    Start: 2024  
End: 2024     ambulatory          Oh R NILL      Facility:[facility]  
   
                                                    Start: 2024  
End: 2024                         Patient encounter   
procedure                               [facility]  
Work Phone:   
[phone]                           General Surgery   
[facility]  
Work Phone:   
[phone]  
   
                          Start: 2024 ambulatory   Oh JENELLE Facility:[facility]  
   
                                                    Start: 2023  
End: 2023     ambulatory          JOSE ABRAHAM  Not Available  
   
                                                    Start: 2023  
End: 2023           ambulatory                Delmis Vizcarra MD                           [facility]  
   
                                                    Start: 2023  
End: 2023     ambulatory          MAKI VELIZ         Facility:[facility]  
   
                                                    Start: 2023  
End: 2023     ambulatory          Jose Abraham Facility:[facility]  
   
                                                    Start: 2023  
End: 2023           ambulatory                NARENDRANATH   
[facility] .                          Facility:[facility]  
   
                                                    Start: 2023  
End: 2023           ambulatory                NARENDRANATH   
[facility] .                          Facility:[facility]  
   
                                                    Start: 2023  
End: 2023           ambulatory                NARENDRANATH   
[place] .                          Facility:[facility]  
   
                                                    Start: 2023  
End: 2023           ambulatory                NARENDRANATH   
[place] .                          Facility:[facility]  
   
                                                    Start: 2023  
End: 2023     ambulatory          DR MAKI VELIZ .    Facility:[facility]  
   
                                                    Start: 2023  
End: 2023     ambulatory          DR MAKI VELIZ .    Facility:[facility]  
   
                                                    Start: 2023  
End: 2023     ambulatory          DR SAMEER HAGAN .  Facility:[facility]  
   
                                                    Start: 2023  
End: 2023     ambulatory          DR MAKI VELIZ .    Facility:[facility]  
   
                                                    Start: 2023  
End: 2023     ambulatory          DR MAKI VELIZ .    Facility:[facility]  
   
                                                    Start: 2023  
End: 2023     ambulatory          MR REGGIE OCAMPO .  Facility:[facility]  
   
                                                    Start: 2022  
End: 2022                         Subsequent hospital   
visit by physician        Christi Freire PTA        [facility]  
   
                                                    Start: 2022  
End: 2022     ambulatory          DR MAKI VELIZ .    Facility:[facility]  
   
                                                    Start: 12-  
End: 2022     ambulatory          DR MAKI VELIZ .    Facility:[facility]  
   
                                                    Start: 2022  
End: 2022     ambulatory          [facility]IE [facility]  
l  
   
                                                    Start: 2022  
End: 2022     ambulatory          MAKI VELIZ       [facility]  
l  
   
                                                    Start: 2022  
End: 2022                         Subsequent hospital   
visit by physician        Christi Freire PTA        [facility]  
   
                                        Comment on above:   Arrived   
   
                                                    Start: 2022  
End: 2022     ambulatory          MAKI [facility]  
l  
   
                                                    Start: 2022  
End: 2022                         Subsequent hospital   
visit by physician        Patel Easley PT        [facility]  
   
                                        Comment on above:   Arrived   
   
                                                    Start: 10-  
End: 10-     ambulatory          DR SAMEER HAGAN .  Facility:[facility]  
   
                                                    Start: 2022  
End: 2022     ambulatory          DR SAMEER HAGAN .  Facility:[facility]  
   
                                                    Start: 2022  
End: 2022     ambulatory          DR SAMEER HAGAN .  Facility:[facility]  
   
                                                    Start: 2022  
End: 2022                         Subsequent hospital   
visit by physician                      [facility]   
Schedule                                [facility]  
   
                                        Comment on above:   Arrived   
   
                                                    Start: 2022  
End: 2022     ambulatory          MAKI [facility]  
l  
   
                                                    Start: 2022  
End: 2022     ambulatory          DR SAMEER HAGAN .  Facility:[facility]  
   
                          Start: 2022 ambulatory   DR SAMEER HAGAN . Faci  
lity:H1  
   
                                                    Start: 2022  
End: 2022     ambulatory          [facility]  
   
                                                    Start: 2022  
End: 2022                         Subsequent hospital   
visit by physician                      [facility] Cov[facility] Screening   
Schedule                                [facility]  
   
                                        Comment on above:   Arrived   
   
                                                    Start: 2022  
End: 2022     ambulatory          DR SAMEER HAGAN .  Facility:[facility]  
   
                                                    Start: 2022  
End: 2022     ambulatory          DR SAMEER HAGAN .  Facility:[facility]  
   
                                                    Start: 2022  
End: 2022     ambulatory          DR SAMEER HAGAN .  Facility:[facility]  
   
                                                    Start: 2022  
End: 2022     ambulatory          [facility]  
   
                                                    Start: 2022  
End: 2022                         Subsequent hospital   
visit by physician                      Maki Veliz MD  
Work Phone:   
[phone]                          [facility] [place] LAB DOCTOR  
   
                                                    Start: 2022  
End: 2022     ambulatory          DR SAMEER HAGAN .  Facility:  
  
  
  
Procedures  
  
  
                          Date         Procedure    Procedure Detail Performing   
Clinician  
   
                                                    Start:   
02-                              Nerve conduction studies 5-6   
studies                                             Krys Pritchett  
   
                                                    Start:   
2022          Comprehensive metabolic panel                     Maki Veliz MD  
Work Phone:   
[phone]  
   
                                                    Start:   
2022          Lipid panel                             Maki Veliz MD  
Work Phone:   
[phone]  
   
                                                    Start:   
2015                              Microscopic observation   
[Identifier] in Cervix by Cyto   
stain                                               Maki Veliz MD  
Work Phone:   
[phone]  
   
                                       Arthroplasty of left shoulder              
  Oh NILL  
Work Phone:   
[phone]  
   
                                       Bladder excision              Oh NIL  
L  
Work Phone:   
[phone]  
   
                                                    Comment on   
above:                                  back   
   
                                       Cholecystectomy              Oh NILL  
Work Phone:   
[phone]  
   
                                       Colonoscopy               Oh NILL  
Work Phone:   
[phone]  
   
                                       Esophagogastroduodenoscopy              M  
ichael NILL  
Work Phone:   
[phone]  
   
                                       Excision of cyst              Oh NIL  
L  
Work Phone:   
[phone]  
   
                                                    Comment on   
above:                                  back   
   
                                       Ostectomy of calcaneus for spur            
    Oh NILL  
Work Phone:   
[phone]  
   
                                                            Radiofrequency ablat  
ion of nerve   
root of lumbar spine using   
fluoroscopic guidance                               Oh NILL  
Work Phone:   
[phone]  
   
                                       Repair of joint of left hip                
Oh SALMERON  
Work Phone:   
[phone]  
   
                                       Repair of joint of right hip               
 Oh SALMERON  
Work Phone:   
[phone]  
   
                                                            Repair of musculoten  
dinous cuff of   
shoulder                                            Oh SALMERON  
Work Phone:   
[phone]  
  
  
  
Plan of Treatment  
  
  
                          Date         Care Activity Detail       Author  
   
                          Start: 2027 Lipid panel  Lipids       [facility]   
[facility] [employer]  
   
                          Start: 2023 ambulatory   Ambulatory   Facility:H  
1  
   
                                                    Start: 2022  
End: 2022           Patient encounter procedure 2022 Appointment   
Physical Therapy   
Christi Freire PTA [facility]   
Therapy  
   
                                                    Start: 2022  
End: 2022           Patient encounter procedure 2022 Appointment   
Physical Therapy   
Christi Freire PTA [facility]   
Therapy  
   
                                                    Start: 2022  
End: 2022           Patient encounter procedure 2022 Appointment   
Physical Therapy   
Christi Freire PTA [facility]   
Therapy  
   
                                                    Start: 2022  
End: 2022           Patient encounter procedure 2022 Appointment   
Physical Therapy   
Christi Freire PTA [facility]   
Therapy  
   
                                                    Start: 2022  
End: 2022           Patient encounter procedure 2022 Appointment   
Physical Therapy   
Christi Freire PTA [facility]   
Therapy  
   
                                        Start: 2022   Pneumococcal 65+ yea  
rs   
Vaccine (1 - PCV)                       Pneumococcal 65+ years   
Vaccine (1 - PCV)                       [facility]   
[facility] [employer]  
   
                          Start: 2022 Influenza vaccination              M  
[facility]  
   
                                Start: 2022 Hemoglobin A1c measurement A1C  
 test (Diabetic or   
Prediabetic)                            [facility]   
[employer]  
   
                          Start: 2022 Influenza vaccination Flu vaccine (#  
1) [facility]   
[facility] [employer]  
   
                                        Start: 2018   Screening for malign  
ant   
neoplasm of cervix                                  [facility]  
   
                          Start: 2015 Creatinine measurement Creatinine     
[facility]  
   
                                        Start: 2015   Potassium [Moles/vol  
ume] in   
Serum or Plasma           Potassium                 [facility]  
   
                                Start: 2012 Screening for osteoporosis DEX  
A (modify frequency   
per FRAX score)                         [facility]   
[facility] [employer]  
   
                                        Start: 2007   Screening for malign  
ant   
neoplasm of breast        Breast cancer screen      [facility]  
   
                                Start: 2007 Shingles vaccine (1 of 2) Shin  
gles vaccine (1 of   
2)                                      [facility]  
   
                                        Start: 2002   Screening for malign  
ant   
neoplasm of colon                                   [facility]  
   
                          Start: 1997 Lipid panel  Lipids       [facility]  
   
                                        Start: 1987   Screening for malign  
ant   
neoplasm of cervix                      HPV (without or with   
Pap)                                    [facility]  
   
                                        Start: 1976   DTaP/Tdap/Td vaccine  
 (1 -   
Tdap)                                   DTaP/Tdap/Td vaccine   
(1 - Tdap)                              [facility]  
   
                          Start: 1975 Hepatitis C screening Hepatitis C sc  
reen [facility]  
   
                          Start: 1972 HIV screening HIV screen   [facility]  
   
                          Start: 1969 Depression Screen Depression Screen   
[facility]  
   
                          Start: 1962 COVID-19 Vaccine (1) COVID-19 Vaccin  
e (1) [facility]  
   
                          Start: 1958 COVID-19 Vaccine (#1) COVID-19 Vacci  
ne (#1) [facility]   
[employer]  
   
                                                      
End: 2022     COVID-19                                [facility]  
Work Phone:   
[phone]  
   
                                        Comment on above:   Once for 1 Occurrenc  
es starting 2022 until 2022   
   
                                                      
End: 2022     COVID-19                                [facility]  
Work Phone:   
[phone]  
   
                                        Comment on above:   Once for 1 Occurrenc  
es starting 2022 until 2022   
   
                                                      
End: 2022                         Hemoglobin   
A1c/Hemoglobin.total in   
Blood                                               [facility] Phone:   
[phone]  
   
                                        Comment on above:   Once for 1 Occurrenc  
es starting 2022 until 2022   
   
                                                      
End: 2022     T4                                      [facility] Phone:   
[phone]  
   
                                        Comment on above:   Once for 1 Occurrenc  
es starting 2022 until 2022   
   
                                                      
End: 2022                         Triiodothyronine (T3) Free   
[Mass/volume] in Serum or   
Plasma                                              [facility] Phone:   
[phone]  
   
                                        Comment on above:   Once for 1 Occurrenc  
es starting 2022 until 2022   
   
                                                      
End: 2022     Vitamin D 25 Hydroxy                     [employer] [facility] [facility]  
Work Phone:   
[phone]  
   
                                        Comment on above:   Once for 1 Occurrenc  
es starting 2022 until 2022   
  
  
  
Immunizations  
  
  
                      Immunization Date Immunization Notes      Care Provider Marck mahoney  
   
                                                    NEGATED: Highlighted   
row has not   
occurred!2024                     influenza virus   
vaccine, unspecified   
formulation                                         Oh SALMERON  
Work Phone:   
[phone]                           General Surgery   
[facility]  
  
  
  
Payers  
  
  
                          Date         Payer Category Payer        Policy ID  
   
                          2024   Self-pay                    
   
                          2023   Unknown                     
   
                          2022   Medicare                    
   
                          2015   Unknown                   [id number] 1.2.84  
[id number]  
   
                          1960   Medicare                  [id number]  
   
                          1960   Unknown                   [id number]  
   
                          1960   Unknown                   [id number]  
   
                          1957   Unknown                   [id number] 2.16.  
[id number]  
   
                          1957   Unknown                   [id number] 2.16.8  
[id number]  
   
                          1957   Unknown                   [id number] 2.16.8  
[id number]  
   
                          1957   Unknown                   [id number] 2.16.8  
[id number]  
   
                          1957   Unknown                   [id number] 2.16.8  
[id number]  
   
                          1957   Unknown                   [id number] 2.16.8  
[id number]  
   
                          1957   Unknown                   [id number] 2.16.84  
[id number]  
   
                          1957   Unknown                   [id number] 2.16.84  
[id number]  
   
                          1957   Unknown                   [id number] 2.16.84  
[id number]  
   
                          1957   Unknown                   [id number] 2.16.84  
[id number]  
   
                          1957   Unknown                   [id number] 2.16.84  
[id number]  
   
                          1957   Unknown                   [id number] 2.16.84  
[id number]  
   
                          1957   Unknown                   [id number] 2.16.84  
[id number]  
   
                          1957   Unknown                   [id number] 2.16.84  
[id number]  
   
                          1957   Unknown                   [id number] 2.16.84  
[id number]  
   
                          1957   Unknown                   [id number] 2.16.84  
[id number]  
   
                          1957   Unknown                   [id number] 2.16.84  
[id number]  
   
                          1957   Unknown                   [id number] 2.16.84  
[id number]  
   
                          1957   Unknown                   [id number] 2.16.84  
[id number]  
   
                          1957   Unknown                   [id number] 2.16.84  
[id number]  
   
                          1957   Unknown                   [id number] 2.16.84  
[id number]  
   
                          1957   Unknown                   [id number] 2.16.84  
[id number]  
   
                          1957   Unknown                   [id number] 2.16.84  
[id number]  
   
                          1957   Unknown                   [id number] 2.16.84  
[id number]  
   
                          1957   Unknown                   [id number] 2.16.84  
[id number]  
   
                          1957   Unknown                   [id number] 2.16.84  
[id number]  
   
                          1957   Unknown                   [id number] 2.16.84  
[id number]  
   
                          1957   Unknown                   [id number] 2.16.84  
[id number]  
   
                          1957   Unknown                   [id number] 2.16.84  
[id number]  
   
                          1957   Unknown                   [id number] 2.16.8  
[id number]  
   
                          1957   Unknown                   [id number] 2.16.8  
[id number]  
   
                          1957   Unknown                   [id number] 2.16.  
[id number]  
   
                          1957   Unknown                   [id number] 2.16.840  
.[id number]  
   
                          1957   Unknown                   [id number] 2.16.8  
[id number]  
   
                          1957   Unknown                   [id number] 2.16.8  
[id number]  
   
                          1957   Unknown                   [id number] 2.16.8  
[id number]  
   
                                       Unknown                   [id number]  
   
                                       Unknown                   [id number] 2.16.  
[id number]  
  
  
  
Social History  
  
  
                          Date         Type         Detail       Facility  
   
                                                    Start: 2012  
End: 2024                         Tobacco smoking status   
NHIS                      Ex-smoker                 [employer] [facility]  
   
                                                      
End: 2012     History of tobacco use Current smoker      [employer] Phone:   
[phone]  
   
                                                    Start: 2012  
End: 2018                         Tobacco use and   
exposure                                Smokeless tobacco   
non-user                                [employer] Phone:   
[phone]  
   
                                Start: 2018 Alcohol intake  Current drinke  
r of   
alcohol (finding)                       [employer] Phone:   
[phone]  
   
                          Start: 1955 Sex Assigned At Birth Not on file  M  
[employer]  
Work Phone:   
[phone]  
   
                                                      
End: 2012     History of tobacco use Cigarette Smoker    [employer] [employer]  
Work Phone:   
[phone]  
   
                                       Tobacco smoking status Never        Gener  
al Surgery   
[facility]  
   
                                       Sex Assigned At Birth Female       [facility]  
   
                          Start: 1900              *Tobacco     [employer]  
Work Phone:   
[phone]  
   
                          Start: 1955 Sex Assigned At Birth Female       F  
[facility]  
  
  
  
Functional Status  
  
  
                          Date         Assessment   Result       Facility  
   
                          2024   Functional Status N/A          [facility]  
[facility]  
  
  
  
Clinical Notes 2022 to 2024  
  Christi Freire, PTA - 2022 3:45 PM Andrey Easley, PT - 2022   
1:30 PM EST  
  
                                Note Date & Type Note            Facility  
   
                                        2024 Note     Chief Complaint  
consultation for nevus  
HPI Staff  
68 year old female presents on   
consultation from Dr. Veliz for   
nevus. Reports solid nodule to   
right neck for approximate;y one   
year. Denies change since first   
noted. Denies this being sore or   
tender. Never been inflamed or   
drained.  
History of Present Illness  
67 yo female with h/o DMII, htn,   
combined systolic/diastolic heart   
failure, hyperlipidemia, GERD,   
hypothyroidism, lumbar stenosis,   
referred for skin lesion on neck;   
patient reports 1 year h/o   
swelling right neck, slight   
increase in size, no pain or   
drainage; no h/o other similar   
lesions; on baby asa daily, no   
NSAIDs; no tobacco use.  
Review of Systems  
PHQ Score  
Initial Depression Screen Score:   
0 SCORE  
ROS - Provider  
Constitutional: no fever, no   
sweats, no weight loss.  
Eyes: no glasses, no blurred   
vision, no visual loss.  
ENMT: no dentures, no hoarseness,   
no swallowing difficulties, no   
hearing loss, no ear   
infection(s), no nose bleeds.  
Cardiovascular: normal blood   
pressure, no chest pain, regular   
heartbeat, no heart murmur.  
Respiratory: no shortness of   
breath, no cough, no asthma, no   
wheezing.  
Gastrointestinal: no nausea, no   
vomiting, no diarrhea, no   
constipation, no blood in stool,   
no change in bowel habits, no   
abdominal pain, no hepatitis.  
Genitourinary: no kidney stones,   
no urine infection, no dysuria.  
Musculoskeletal: no pain, no   
weakness.  
Skin: no changing moles, no rash,   
yes skin lumps.  
Neurologic: no seizures, no   
epilepsy, no headache.  
Psychiatric: no emotional or   
psychiatric problem.  
Heme/Lymph: no bleeding problems,   
no anemia, no blood clots, no   
transfusions.  
Allergy/Immunologic: no swollen   
lymph nodes/glands, no IV drug   
abuse.  
Other:  
Additional ROS info: Except as   
noted in the above Review of   
Systems and in the History of   
Present Illness, all other   
systems have been reviewed and   
are negative or noncontributory.  
  
Physical Exam  
Vitals & Measurements  
HR: 72(Peripheral) RR: 16 BP:   
130/86  
HT: 64 in HT: 162.5 cm WT: 92.8   
kg WT: 204.16 lb BMI: 35.14  
skin: 1 cm epidermal cyst right   
neck, no erythema or drainage,   
nontender.  
Assessment/Plan  
1. Epidermal cyst of neck (L72.0:   
Epidermal cyst)  
plan excisional biopsy under   
local anesthesia in the office,   
informed consent obtained.  
Follow-up  
No qualifying data available  
Problem List/Past Medical History  
Ongoing  
Acute combined systolic and   
diastolic heart failure..  
Anxiety  
BMI 35.0-35.9,adult  
Diabetes mellitus  
Epidermal cyst of neck  
Gastroesophageal reflux disease  
Hypercholesterolemia  
Hyperlipidemia  
Hypertensive disorder  
Hypothyroidism  
Lumbar spinal stenosis  
Morbid obesity  
Obese  
Historical  
No qualifying data  
Procedure/Surgical History  
Arthroplasty of left hip,   
Arthroplasty of left shoulder,   
Arthroplasty of right hip,   
Cholecystectomy, Colonoscopy,   
Cystectomy, EGD -   
esophagogastroduodenoscopy,   
Excision of calcaneal spur,   
Excision of cyst, Radiofrequency   
ablation of nerve root of lumbar   
spine using fluoroscopic   
guidance, Rotator cuff repair.  
Medications  
Acidophilus Probiotic Blend  
aspirin 81 mg Oral EC Tab, 81 mg=   
1 tab(s), Oral, Daily  
Cranberry  
Cytomel 5 mcg Tab, 5 mcg= 1   
tab(s), Oral, Daily  
duloxetine 30 mg Cap-DR, 1   
cap(s), Oral, BID  
Fish Oil 1200 mg oral capsule,   
1200 mg= 1 cap(s), Oral, BID  
hydrochlorothiazide 25 mg Tab, 25   
mg= 1 tab(s), Oral, Daily  
losartan 25 mg Tab, 25 mg= 1   
tab(s), Oral, Daily  
metformin 500 mg Tab, 500 mg= 1   
tab(s), Oral, BID  
metoprolol tartrate 100 mg Tab,   
100 mg= 1 tab(s), Oral, BID  
Pantoprazole 40 mg DR Tab, 40 mg=   
1 tab(s), Oral, Daily  
simvastatin 20 mg Tab, 20 mg= 1   
tab(s), Oral, qPM  
Vitamin D3 2000 intl units oral   
Tab, 2 tab(s), Oral, Daily  
Allergies  
Latex (Skin rash)  
Nickel (Skin rash)  
Percocet (Itching)  
Vicodin (Itching)  
penicillin (Rash)  
Social History  
Alcohol - Denies Alcohol Use,   
2024  
Substance Abuse - Denies   
Substance Abuse, 2024  
Tobacco  
Former smoker, quit more than 30   
days ago Tobacco Use:. Never   
Smokeless Tobacco Use:.   
Cigarettes, 0.25 per day. Started   
age 15.0 Years. Stopped age 54   
Years., 2024  
Family History  
Dementia: Mother.  
Immunizations  
Vaccine Date Status Comments  
influenza virus vaccine,   
inactivated - Not Given Patient   
Refuses                                 [facility]  
   
                                        Comment on above:   Result Comment: Elec  
tronically Signed By: JENELLE FAJARDO,   
Oh Le\Date and Time Signed: 24 16:02 EST   
   
                                        2023 Note     [id number]  
[id number]  
[id number]  
   
                                        2023 Note     Education Materials  
DR. MANDEL POST OPERATIVE   
SHOULDER INSTRUCTIONS  
SURGEONS WRITTEN INSTRUTCTIONS:  
1. If you have been given a cryo   
cuff after surgery you should use   
it as much as possible for the   
first 24-48 hours. After that it   
is optional. TIP: Many patients   
prefer to use it a little longer   
because it helps reduce pain  
2. You should wiggle your fingers   
frequently  
3. Change your dressings in 1   
day. If steri-strips have been   
applied DO NOT remove them. When   
the wound is clean and dry you   
may leave it open to air but   
again DO NOT remove any   
steri-strips that have been   
applied  
4. You may shower in 1 day but do   
not let the water stream directly   
strike the wound  
5. Do pendulum exercises for at   
least 10 minutes twice a day  
6. If you have any problems or   
concerns, please call the office   
at [phone]  
7. Follow up as scheduled               [facility]  
   
                                        2023 Note     [facility]  
Clinical Discharge Summary  
PERSON INFORMATION  
Name SHERRIE LOZADA Age 67   
Years  1955  
Sex FEMALE Language [facility]   
MAKI VELIZ  
Marital Status Single Phone Med   
Service Observation  
MRN [id number] Acct# Arrival   
2023 08:33:12  
Visit Reason SURGERY - LEFT   
REVERSE TOTAL SHOULDER Acuity LOS   
000 26:35  
Address:  
[address] [place], [place] [id number]  
Comment:  
PROVIDER INFORMATION  
VITALS INFORMATION  
Vital Sign Triage Latest  
Temp Oral 35.9 DegC 36.8 DegC  
Temp Temporal  
Temp Intravascular  
Temp Axillary  
Temp Rectal  
02 Sat 100 % 94 %  
Respiratory Rate 16 br/min 18   
br/min  
Peripheral Pulse Rate 56 bpm 70   
bpm  
Apical Heart Rate  
Blood Pressure 138 mmHg / 95 mmHg   
106 mmHg / 70 mmHg  
Comment:  
MEDICAL INFORMATION  
Allergy Info:  
Nickel; Latex; Vicodin; Percocet;   
penicillin  
Medication List:  
Medications That Were Updated -   
Follow Below Instructions  
Other Medications  
Updated: cranberry (Cranberry   
oral capsule) 1 tab(s) Oral 2   
times a day.  
Medications to Continue That Have   
Not Changed  
Other Medications  
ascorbic acid (Vitamin C 1000 mg   
oral tablet) 3 tab(s) Oral every   
day.  
aspirin (aspirin 81 mg oral   
delayed release tablet) 1 tab(s)   
Oral every day.  
baclofen (baclofen 10 mg oral   
tablet) 1 tab(s) Oral every day.  
biotin (biotin 5000 mcg oral   
capsule) 1 cap(s) Oral every day.  
cholecalciferol (Vitamin D3 1000   
intl units oral tablet) 1 tab(s)   
Oral 2 times a day.  
garlic (Garlic Oil oral capsule)   
1 cap(s) Oral 2 times a day.  
hydroCHLOROthiazide   
(hydroCHLOROthiazide 25 mg oral   
tablet) 1 tab(s) Oral every day.  
liothyronine (liothyronine 5 mcg   
oral tablet) 1 tab(s) Oral every   
day.  
losartan (losartan 25 mg oral   
tablet) 1 tab(s) Oral every day.  
magnesium gluconate (magnesium   
gluconate 250 mg oral tablet) 1   
tab(s) Oral every day.  
metFORMIN (metFORMIN 500 mg oral   
tablet) 1 tab(s) Oral 2 times a   
day.  
metoprolol (metoprolol tartrate   
25 mg oral tablet) 1 tab(s) Oral   
2 times a day.  
omega-3 polyunsaturated fatty   
acids (Fish Oil 1000 mg oral   
capsule) 1 cap(s) Oral 2 times a   
day.  
pantoprazole (pantoprazole 40 mg   
oral delayed release tablet) 1   
tab(s) Oral every day.  
simvastatin (simvastatin 20 mg   
oral tablet) 1 tab(s) Oral every   
day.  
Template Non-Formulary (beet   
root) Oral every day.  
Template Non-Formulary (veggie   
and fruit tablet) Oral 2 times a   
day.  
traMADol (traMADol 50 mg oral   
tablet) 1 tab(s) Oral Every 12   
hours scheduled time as needed as   
needed for pain.  
zinc gluconate (zinc (as   
gluconate) 50 mg oral tablet) 1   
tab(s) Oral every day.  
Comment:  
Lab and Radiology Results  
Laboratory or Other Results This   
Visit (last charted value for   
your 2023 visit)  
Chemistry  
2023 1:31 PM  
Glucose, POC: 108 mg/dL -- Normal   
range between ( 74 and 118 )  
Diagnostic Radiology  
2023 1:47 PM  
XR Shoulder 1 View Left: XR   
Shoulder 1 View Left  
Radiology Report  
2023 1:47 PM  
Radiology Report: Radiology   
Report  
DIET & ACTIVITY  
Patient Activity Level:  
Patient Diet:  
Regular  
Patient Activity Restrictions:  
DISCHARGE INFORMATION  
Discharge Disposition:  
Discharge Location:  
DEPART REASON INCOMPLETE   
INFORMATION  
PATIENT EDUCATION INFORMATION  
Instructions:  
Leigh- Post Op Shoulder   
(CUSTOM)  
Follow up:  
With: Address: When:  
Jose Abraham [address] [place] [address]  
[phone] [facility] (1)   
2023 10:45 AM  
DIAGNOSIS  
Arthritis of left glenohumeral   
joint  
Comment:  
PHYS DOC NOTES                          [facility]  
   
                                        2023 Note     [phone]  
[id number]  
[id number]                   [facility]  
   
                                        2023 Note     CLINICAL HISTORY: Po  
stoperative   
evaluation left shoulder   
arthroplasty  
COMPARISON: None available  
TECHNIQUE: Frontal view of the   
left shoulder.  
FINDINGS:  
Postsurgical changes of total   
reverse shoulder arthroplasty.   
Alignment appears anatomic on   
this single frontal view. No   
periprosthetic abnormality. Mild   
degenerative changes of the   
acromioclavicular joint.  
IMPRESSION:  
Postsurgical changes of left   
shoulder arthroplasty.  
***** Final *****  
Signed (Electronic Signature):   
Miguel Petit DO 23 4:32   
pm  
Technologist: YONG MARCUS                     [facility]  
   
                                        Comment on above:   Order Comment: erasmo munoz status post shoulder replacement   
   
                                        2023 Note     CONSULTATION  
CONSULTATION DATE: 2023  
TO: Maki Veliz M.D.  
CHIEF COMPLAINT: Includes severe   
bilateral hip pain, buttock pain,   
worse on  
the right than left side.  
HISTORY: She describes it at   
5-7/10 pain, burning in   
character, sensitive to  
light touch, also seems to   
increase with activity such as   
standing, walking and  
performing transitioning   
maneuvers. She feels most   
comfortable in the  
semi-recumbent position. Denies   
any change in bowel and bladder   
habits or new  
sensorimotor changes in the lower   
extremities  
EXAM: Notable for patient having   
dysesthesia and hyperesthesia   
overlying the  
distribution of the lateral   
cutaneous branch of the   
iliohypogastric nerve  
bilaterally, more significant on   
the right than the left side.  
IMPRESSION: Our impression is   
patient with chronic pain   
secondary to neuritis  
involving the lateral cutaneous   
branch of the iliohypogastric   
nerve bilaterally,  
worse on the right than the left   
side. She has undergone a   
diagnostic  
injection bilaterally, of the   
same nerve, under fluoroscopic   
guidance. She  
reports the pain symptoms were   
improved by 100% starting in the   
immediate post  
procedural period, lasting for   
several hours, with recurrence of   
pain back to  
her baseline.  
RECOMMENDATIONS: I recommend   
proceeding with a rhizotomy using   
radiofrequency  
ablation of the lateral cutaneous   
branch of the iliohypogastric   
nerve, starting  
on the right side initially and   
then proceeding to the left side,   
approximately  
1-2 weeks lateral. I have asked   
her to trial Zonegran 50 mg at   
h.s. and  
re-trial Zanaflex one week after   
starting her Zonegran.  
As part of providing excellent,   
safe, comprehensive care, the   
following was  
completed at our patient's visit:  
1. A medication reconciliation   
and review to ensure accurate   
knowledge of  
current/active medications,   
including asking our patients to   
inform us about  
any over-the-counter medications   
or herbal remedies/nutritional  
supplements/alternative remedies.  
2. A review to specifically   
ensure our patients have had   
annual screening for:  
elevated body mass index (BMI,   
see intake chart for exact   
total), tobacco use,  
screening for depression, and   
screening for unhealthy alcohol   
use. When  
screening is concerning, patients   
are provided with education and   
the specific  
recommendation to discuss the   
concerning health issue and   
treatment options  
with their primary care provider.       [facility]  
   
                                        2023 Note     CONSULTATION  
CONSULTATION DATE: 2023  
TO: Dr. Maki Veliz and Dr. Sherrie Osman  
CHIEF COMPLAINT: Includes severe   
bilateral lower back pain.  
HISTORY OF PRESENT ILLNESS: She   
presented today complaining of   
5-7/10 pain in  
her lower back, occurring   
bilaterally, describes it as   
sharp pain, increased  
with activities such as standing,   
walking and performing   
transitioning  
maneuvers. She feels most   
comfortable in the semi-recumbent   
position. Denies  
any change in bowel and bladder   
habits or new sensorimotor   
changes in the lower  
extremities.  
EXAM: Notable for the patient   
having a non-focal sensorimotor   
exam of the  
lower extremities. DTRs were   
symmetrical. She has a negative   
straight leg  
raise. She had no clinical   
myelopathy involving the lower   
extremities. The  
patient did have tenderness along   
the SI joints bilaterally, as   
well as  
positive bilateral Gaenslen's   
maneuver, pelvic compression test   
and Billy's  
test. Patient had significant   
myofascial dysfunction along the   
lumbar  
paravertebral muscles occurring   
bilaterally. She also had   
significant  
dysesthesias and hypoesthesia   
long the distribution of the   
lateral cutaneous  
and hypogastric nerve, also   
occurring bilaterally.  
IMPRESSION: Patient with chronic   
pain secondary to SI joint   
dysfunction.  
RECOMMENDATIONS: I recommended   
bilateral SI joint injection   
under fluoroscopy  
guidance. I have actually   
discontinued Flexeril secondary   
to ineffectiveness.  
Will trial her on Zanaflex 4 mg   
pill, half a pill to one pill up   
to b.i.d. as  
tolerated. I have asked her to   
discontinue her Xanax and have   
also recommended  
Narcan spray available. We did   
discuss these benzodiazepines and   
offered  
analgesics with the patient. All   
her questions answered. She   
agrees to  
proceed with the outlined plan.         [facility]  
   
                                        2023 Note     CONSULTATION  
CONSULTATION DATE: 2023  
HISTORY OF PRESENT ILLNESS: This   
is a 67-year-old female who   
returns to the  
clinic for a three month follow   
up. She was last seen at the end   
of October  
and had pain 8/10. At that time,   
she received bilateral trapezius   
and trigger  
point injections, which she   
reports next to no relief. The   
patient states her  
pain is 8/10 today. She has   
bilateral anterior shoulder pain,   
which she was  
told by her PCP is tendonitis,   
and diffuse lower back pain. She   
did go to Dr.  
Stepanic's office and they   
proceeded to do lumbar imaging,   
lumbar MRI which  
does show significant disc   
herniation between L5 and S1. The   
patient does have  
a Neurosurgery appointment   
pending with Dr. Sherrie Osman   
in [place]  
Neurosurgery. She also feels she   
is having spasms in her back. She   
can walk  
for short periods of time, but   
then she needs to sit down. She   
did have  
radiofrequency ablations in   
August of this year which   
afforded her minimal to  
no relief. Current medication   
include diclofenac 75 mg b.i.d.,   
tramadol 50 mg  
b.i.d. p.r.n., baclofen 10 mg,   
which she stopped taking due to   
being  
ineffective, and a vitamin   
regimen. She has recently been   
placed on metformin  
and has had approximately a 20   
pound weight loss. The patient is   
very  
discouraged with this pain at   
this time. We had discussed, in   
the past,  
increasing different pain   
medications, but she does have an   
allergy to  
hydrocodone and oxycodone.   
Patient does feel better when she   
bends forward or  
when she is sitting down.   
Standing and walking and doing   
housework are the  
most painful activities for her.  
Patient's REVIEW OF SYSTEMS /   
PAST MEDICAL HISTORY / ALLERGIES   
and IMAGES have  
been reviewed and noted on the   
chart.  
PHYSICAL EXAM:  
VITAL SIGNS: Blood pressure   
105/70, heart rate is 59.   
Temperature is 96.9.  
She is 5'4 , weighs 108 kg.  
GENERAL IMPRESSION: Pleasant,   
appropriate, obvious discomfort   
sitting in the  
chair.  
FOCUSED EXAM - BACK: Range of   
motion is guarded in lateral   
rotation and  
flexion/extension. Paravertebral   
muscles are taut but   
non-spasmodic.  
Tenderness is reproduced upon   
compression along the lower   
lumbar facets of L3,  
L4, L5 bilaterally. Josse's   
point is non-tender at this time.  
MUSCULOSKELETAL: Bilateral upper   
extremities motor is 4/5   
bilaterally. Range  
of motion of bilateral shoulders   
is guarded in lateral raises and   
adduction.  
Point tenderness along bilateral   
bicipital head tendon sheath and   
compression  
reproduces the patient's pain   
pattern. Lower extremities   
bilaterally motor is  
4/5. Patient walks unassisted   
with a stable gait.  
NEUROLOGICAL: Radicular sensory   
is intact. Negative   
polyneuropathy.  
DIAGNOSIS: Bilateral bicipital   
head tendon sheath tendonitis,   
lumbar  
degenerative disc disease, lumbar   
disc displacement and lumbar   
spondylosis.  
PLAN: Patient will receive   
bilateral bicipital tendon sheath   
injections in the  
office today, which she does   
consent to. We will increase her   
tramadol to 100  
mg b.i.d. We will also start her   
on Flexeril 10 mg q.h.s. and I   
encouraged her  
to use a menthol heat rub with   
heat application. We will see her   
in three  
months' time, unless otherwise   
indicated. We did ask that she   
call the office  
with an update after her   
Neurosurgery appointment in early   
February. Patient  
agrees with this plan.                  [facility]  
   
                                        2023 Note     CONSULTATION  
PROCEDURE DATE: 2023  
PREOPERATIVE DIAGNOSIS: Bilateral   
bicipital head sheath tendonitis.  
POSTOPERATIVE DIAGNOSIS:   
Bilateral bicipital head sheath   
tendonitis.  
PROCEDURE: Bilateral bicipital   
head tendon sheath injection.  
Subsequent to obtaining informed   
consent, the patient was placed   
in the upright  
sitting position. Alcohol prep   
was used to sterilize each   
location. A 25  
gauge needle with 0.125% Marcaine   
and 40 mg of Kenalog was placed   
was divided  
into two locations. The needle   
was placed to rest inside each   
bicipital head  
tendon. Negative heme. Medication   
was injected in a slow pattern.   
Patient  
tolerated the procedure well and   
will be followed up in the   
clinic.                                 [facility]  
   
                                                    2022 History of   
Present illness Narrative               Formatting of this note might be   
different from the original.  
[facility]  
Inpatient/Observation/Outpatient   
Rehabilitation  
  
Date: 2022  
Patient Name: Sherrie Lozada []   
Inpatient Acute/Observation [x]   
Outpatient  
: 1955  
  
[x] Pt cancelled due to: [] No   
Reason Given [] Sick/ill [x]   
Other:  
No reason given to    
for today's cancelled visit,   
however PTA did speak with   
patient earlier in the week and   
she stated she had some medical   
issues going on that she is   
seeking a doctor's advice for.  
  
Therapist/Assistant will attempt   
to see this patient, at our   
earliest opportunity.  
  
Christi Freire, [place] [id number] Date:   
2022  
  
Electronically signed by Patel Easley PT at 2022 10:23 AM   
EST  
documented in this encounter            [employer] Phone:   
[phone]  
   
                                                    2022 History of   
Present illness Narrative               Formatting of this note is   
different from the original.  
Phone: [phone] [facility]  
Fax: [phone] Outpatient   
Physical Therapy  
Evaluation  
Date: 2022  
Patient: Sherrie Lozada  
: 1955  
[facility] #: [id number]  
  
Referring Physician: Reggie Ocampo PA-C  
  
Medical Diagnosis: R hip pain,   
M25.551  
Treatment Diagnosis: Low back and   
R hip pain  
PT Insurance Information: [employer]  
Total # of Visits Approved: 12  
Total # of Visits to Date: 1  
No Show: 0  
Canceled Appointment: 0  
  
Subjective  
Subjective: Patient reports hx of   
back pain for years and R hip   
pain started recently. Had xrays   
for R hip on Friday that showed   
trochanteric bursitis and liner   
of OLGA LIDIA is wearing down. MRI   
pending insurance approval. 8/10   
pain after work and being on her   
feet all day. Heat packs and cold   
packs that provide temporary   
relief but then it comes right   
back. Pain medications don't   
help.  
Additional Pertinent Hx: Hx of B   
OLGA LIDIA's, arthritis, HTN, anxiety  
  
Observations:  
General Observations  
Description: Pt stands with   
lumbar hyperlordosis and B   
anterior pelvic tilt, Moderate   
TTP L5/S1, R PSIS, R greater   
trochanter; (-) B slump, SLR, (-)   
B MICKIE, good pelvic alignment,   
relief with manual lumbar   
traction with therapy ball  
  
Objective  
  
Strength  
  
Strength LLE  
L Hip Flexion: 4-/5  
L Hip ABduction: 4/5  
L Hip ADduction: 4/5  
L Knee Flexion: 4/5  
L Knee Extension: 4-/5  
L Ankle Dorsiflexion: 4/5  
  
Lumbar Assessment  
AROM Lumbar Spine  
Lumbar spine general AROM:   
flexion: 6 in from floor, no   
pain; B SB: to knees, no pain  
  
Special Tests:  
Strength RLE  
R Hip Flexion: 3/5  
R Hip ABduction: 4/5  
R Hip ADduction: 4/5  
R Knee Flexion: 4/5  
R Knee Extension: 4-/5  
R Ankle Dorsiflexion: 4-/5  
Strength LLE  
L Hip Flexion: 4-/5  
L Hip ABduction: 4/5  
L Hip ADduction: 4/5  
L Knee Flexion: 4/5  
L Knee Extension: 4-/5  
L Ankle Dorsiflexion: 4/5  
  
Exercises:  
Exercise 1: HEP: PPT, marching,   
glut sets, LTR  
  
Manual:  
Manual Traction: Gentle manual   
lumbar traction with therapy ball   
with relief of pain noted  
  
  
Functional Outcome Measures  
Get out of bed: Somewhat   
difficult  
Sleep through the night: Somewhat   
difficult  
Turn over in bed: Somewhat   
difficult  
Ride in a car: Minimally   
difficult  
Stand up for 20-30 minutes:   
Fairly difficult  
Sit in a chair for several hours:   
Somewhat difficult  
Climb one flight of stairs:   
Fairly difficult  
Walk a few blocks (300-400 m):   
Fairly difficult  
Walk several kilometers - miles:   
Unable to do  
Reach up to high shelves:   
Minimally difficult  
Throw a ball: Unable to do  
Run one block (about 100 m):   
Unable to do  
Take food out of the   
refrigerator: Somewhat difficult  
Make your bed: Fairly difficult  
Put on socks (pantyhose):   
Somewhat difficult  
Bend over to clean the bathtub:   
Very difficult  
Move a chair: Somewhat difficult  
Pull or push heavy doors:   
Somewhat difficult  
Carry two bags of groceries:   
Somewhat difficult  
Lift and carry a heavy suitcase:   
Very difficult  
[place] Total Score: 55  
  
Assessment  
Assessment: Patient is 67 year   
old female with dx of R hip pain   
who presents with lower back and   
R hip pain 8/10 at worst after   
working all day on her feet.Pt   
stands with lumbar hyperlordosis   
and B anterior pelvic tilt,   
Moderate TTP L5/S1, R PSIS, R   
greater trochanter; (-) B slump,   
SLR, (-) B MICKIE, good pelvic   
alignment, relief with manual   
lumbar traction with therapy   
ball. Patient with decreased   
lumbar AROM flexion: 6 in from   
floor with pain and B SB: to   
knees with no pain. Patient with   
decreased core strength: 3+/5 and   
decreased R hip flex: 3/5   
compared to L hip flex: 4-/5.   
Patient to benefit from aquatic   
physical therapy to offload the   
spine and the R hip and to   
improve strength and ROM to   
return to PLOF.  
Therapy Prognosis: Good  
  
  
Decision Making: Low Complexity  
  
Patient Education  
PT eval, POC, HEP  
Pt verbalized/demonstrated good   
understanding: [X] Yes [] No, pt   
required further clarification.  
  
Goals  
Short Term Goals  
Time Frame for Short Term Goals:   
3 weeks  
Short Term Goal 1: Patient to   
initiate HEP for improved lumbar   
ROM and core strength.  
Short Term Goal 2: Patient to   
tolerate 45 min of aquatic   
exercise to decrease low back and   
L hip pain.  
Short Term Goal 3: Patient to be   
instructed in gentle core and B   
hip strengthening to improve   
lumbar stability.  
  
Long Term Goals  
Time Frame for Long Term Goals :   
6 weeks  
Long Term Goal 1: Patient to be   
safe and independent with HEP.  
Long Term Goal 2: Patient to have   
improved core and B hip strength   
>/=4/5 all planes for improved   
lumbar stability.  
Long Term Goal 3: Patient to have   
improved lumbar AROM flexion: 4in   
from floor with no increase in   
pain.  
Long Term Goal 4: Patient to   
report >/=75% improvement in   
symptoms for improved QOL.  
  
Minutes Tracking:  
Time In: 1330  
Time Out: 1410  
Minutes: 40  
Timed Code Treatment Minutes: 39   
Minutes  
  
Patel Easley PT, DPT   
2022  
  
Electronically signed by Patel Easley PT at 2022 2:25 PM   
EST  
documented in this encounter            [facility] [employer]  
Work Phone:   
[phone]  
   
                                        10- Note     CONSULTATION  
PROCEDURE DATE: 10/26/222  
PREOPERATIVE DIAGNOSIS: Bilateral   
thoracic trapezius spasms and   
bilateral  
lumbar paravertebral spasms.  
POSTOPERATIVE DIAGNOSIS:   
Bilateral thoracic trapezius   
spasms and bilateral  
lumbar paravertebral spasms.  
PROCEDURE: Bilateral thoracic   
trapezius injections and   
bilateral lumbar  
paravertebral injections.  
Subsequent to obtaining informed   
consent, the patient was placed   
in upright  
standing forward flexion   
position. A 25 gauge needle with   
2 cc Marcaine, 1 cc  
40 mg of Kenalog, and 2 cc of   
normal saline, was divided into   
four locations.  
Needle was placed to rest inside   
each location of the trigger   
points. Negative  
heme. Medication was injected in   
a slow, fan-like pattern. Patient   
tolerated  
the procedure well with no overt   
complications, will be followed   
up in the  
office in three months' time.           [facility]  
   
                                        2022 Note     CONSULTATION  
CONSULTATION DATE: 2022  
HISTORY OF PRESENT ILLNESS: This   
is a pleasant, 66-year-old female   
returning  
to the clinic status post   
bilateral lumbar RFAs of L2, L3   
and L4, L5 completed  
on 2022. The patient states   
she is uncertain of the relief as   
she is  
having increased pressure, ache   
and tightening to her lumbar   
region. She  
reports her pain with activity   
7-8/10 and in the standing   
position. Standing,  
working, physical activity is   
aggravating to her pain. The pain   
decreases to  
2/10 with sitting. Patient is a   
 at a restaurant. Current   
medications  
include diclofenac 50 mg b.i.d.,   
tramadol 50 mg b.i.d. by her PCP   
for shoulder  
pain, and Xanax p.r.n. She denies   
any new radicular pain, radiating   
pain or  
vasomotor changes.  
Patient's REVIEW OF SYSTEMS /   
PAST MEDICAL HISTORY / ALLERGIES   
and IMAGES have  
been reviewed and they are noted   
on the chart.  
PHYSICAL EXAM:  
VITAL SIGNS: Blood pressure   
167/95, heart rate is 85.   
Temperature is 97.3.  
She is 5'4 , weighs 116.4 kg.  
GENERAL IMPRESSION: Pleasant,   
appropriate, no acute distress.  
FOCUSED EXAM - BACK: Significant   
paravertebral spasming noted to   
lower  
thoracic and lower lumbar   
regions. Compression along these   
muscles reproduced  
the patient's pain   
symptomatology. No reproduction   
of spinal axial pain upon  
compression along the lower   
lumbar facets, indicative of   
successful RFA.  
Josse's point non-tender.   
Negative FABERs and compression   
test. Range of  
motion is functional in lateral   
rotation and flexion/extension.  
MUSCULOSKELETAL: Motor is intact   
bilateral lower extremities, 4/5   
bilaterally.  
Slight muscle atrophy noted to   
bilateral quadriceps. Patient   
walks with an  
antalgic gait without an   
assistive device.  
NEUROLOGICALLY: Radicular sensory   
is intact. Negative   
polyneuropathy.  
DIAGNOSIS: Lumbar and thoracic   
paravertebral spasms, lumbar   
spondylosis,  
lumbar degenerative disc disease   
and chronic lower back pain.  
PLAN: The patient will start on   
baclofen 10 mg q.h.s. We will   
preauthorize  
for thoracic and lumbar   
paravertebral trigger point   
injections. She was  
instructed to use Voltaren 1% gel   
mixed with Vicks VapoRub to her   
back with  
heat application to follow.   
Stretches were demonstrated and   
encouraged as well.  
Upon authorization, patient will   
be brought back to the clinic to   
receive her  
trigger point injections and   
patient agrees to move forward.         [facility]  
   
                                        2022 Note     CONSULTATION  
CONSULTATION DATE: 2022  
CHIEF COMPLAINT: Low back pain.  
HISTORY OF PRESENT ILLNESS: This   
is a very pleasant, 66-year-old   
female who  
has had successful diagnostic   
medial branch blocks on two   
separate occasions.  
Both times afforded the patient   
% relief for 24 hours or   
so. The patient  
works as a . The patient   
currently reports the pain as a   
6/10, worse  
with walking. The patient enjoys   
walking. The patient currently   
takes  
diclofenac 75 mg b.i.d., tramadol   
50 mg b.i.d. but does not take   
this since it  
did not help her, Xanax on a   
p.r.n. basis and has been taking   
a vitamin regimen.  
The patient is focused now with   
regards to her own wellness and   
health.  
The patient's PAST MEDICAL   
HISTORY / SURGICAL HISTORY /   
REVIEW OF SYSTEMS are  
noted on the chart, along with   
the MEDICATION LIST / ALLERGIES   
and RADIOLOGICAL  
IMAGES.  
PHYSICAL EXAMINATION:  
GENERAL: Upon physical   
examination, this is a pleasant,   
cooperative female who  
does not appear to be in any   
acute distress.  
VITAL SIGN: Slightly elevated at   
135/92 with a heart rate of 62.   
At a height  
of 5'4 , the patient weighs 116   
kg.  
FOCUSED EVALUATION: Extension,   
compression, direct palpation   
along the  
posterior elements aggravate the   
patient's pain concordant with   
facet  
arthropathy, lumbar spondylosis.  
EXTREMITIES: No pedal edema is   
noted. Quality of the muscles is   
poor; however,  
within normal limits.  
NEUROLOGICALLY: The patient does   
not have any radicular   
symptomatology.  
PSYCHIATRICALLY: Affect is   
appropriate.  
IMPRESSION: Chronic vertebrogenic   
low back pain, lumbar   
degenerative disc  
disease, lumbar spondylosis.  
PLAN: We will do a U-Tox in the   
office today. Questions/answers   
were done.  
The patient will be scheduled for   
a therapeutic rhizotomy   
radiofrequency  
ablation of the dorsal rami at   
the level of L2-L3 and L4-L5   
bilaterally,  
initially on the right hand side,   
followed by the left hand side.   
The patient  
understands and would like to   
proceed.  
IFC Signed and Approved by: DR SAMEER HAGAN .  
2022 09:28:00                     [facility]  
   
                                        2022 Note     CONSULTATION  
CONSULTATION DATE: 2022  
This is a pleasant 66-year-old   
female returning to the clinic   
status post #1  
bilateral MBB to L2, L3 and L4,   
L5 that afforded her 100% relief   
for two days.  
Following that her pain returned   
to baseline which was is 6 out of   
10. The  
patient reports activity such as   
standing, walking, ADLs and   
physical activity  
as aggravating factors; sitting   
and sleeping relieves her pain.   
She currently  
takes Tylenol, diclofenac 50 mg   
b.i.d., multivitamin and fish   
oil. She is  
pleased with the results and   
would like to move forward with   
the second  
injection. The patient is   
somewhat concerned that she has a   
2-day-long  
shopping trip with her daughter   
coming up this weekend and she is   
afraid her  
pain will become too great with   
all the walking. She denies any   
new radicular  
pain or vasomotor changes.  
REVIEW OF SYSTEMS, PAST MEDICAL   
HISTORY, ALLERGIES AND IMAGES:   
Have been  
reviewed and noted in the chart.  
PHYSICAL EXAM:  
VITAL SIGNS: Blood pressure   
96/66, heart rate is 56,   
temperature is 97.4.  
Height is 5'4 , weighs 115.6 kg.  
GENERAL APPEARANCE: Pleasant,   
appropriate and in no acute   
distress sitting in  
the chair.  
FOCUSED EXAM:  
BACK: Range of motion is guarded   
in lateral rotation and   
flexion/extension.  
Reproduction of spinoaxial pain   
noted to direct compression along   
the posterior  
elements of the facets of L2, L3,   
and L4, L5 which was concordant   
with ill  
facet arthropathy, lumbar   
spondylosis. Josse's point   
mildly tender to the  
right with referral pain to the   
right hip.  
MUSCULOSKELETAL: Motor is intact,   
4 out of 5 bilaterally. The   
patient  
ambulates with a stable gait.  
NEUROLOGICAL: Negative   
polyneuropathy, bilateral   
patellar reflexes +2.  
DIAGNOSIS: Lumbar spondylosis,   
lumbar degenerative disk,   
spinoaxial lower back  
pain, obesity.  
PLAN: We will move forward with   
the #2 bilateral MBB to L2, 3 and   
L4, L5  
pending insurance authorization.   
In regards to her upcoming trip,   
I will give  
her 3 days' worth of Tramadol 50   
mg b.i.d. and dispense 6 tablets.   
The patient  
was made aware of the risks and   
benefits of this medication. In   
the meantime,  
nutrition importance was   
discussed as was vitamins. She is   
to continue with  
heat rub and heat source daily to   
her back. The patient agrees with   
the plan  
of care and would like to move   
forward and she will be followed   
up in the  
office post-procedure.  
IFC Signed and Approved by: JAI MURRAY .  
2022 15:07:00                     [facility]  
   
                                        Evaluation + Plan note   
  
  
Future Appointments  
  
  
Appointment Date:2024   
02:00:00 PM  
Scheduled Provider:Oh SALMERON MD  
Location:[facility]  
Appointment Type: Procedure 30  
                                        [facility]  
Work Phone:   
[phone]  
   
                                        Evaluation + Plan note   
  
  
Future Appointments  
  
  
Appointment Date:2024   
02:00:00 PM  
Scheduled Provider:Oh SALMERON MD  
Location:[facility]  
Appointment Type: Established   
15  
                                        [facility]  
Work Phone:   
[phone]  
   
                                        Evaluation note     No assessment inform  
ation   
available                               [facility]  
Work Phone:   
[phone]  
   
                                        Hospital course Narrative   
  
  
No data available for this   
section                                 [facility]  
Work Phone:   
[phone]  
   
                                                    Hospital Discharge   
instructions                              
  
  
No data available for this   
section                                 [facility]  
Work Phone:   
[phone]  
   
                                        Progress note         
  
  
No data available for this   
section                                 [facility]  
Work Phone:   
[phone]  
  
  
  
Advance Directives  
No Advanced Directives Records FoundDocuments on File  
  
                          Type         Date Recorded Patient Representative Expl  
anation  
   
                          ACP-Advance Directive                             
   
                          ACP-Power of                              
  
                           Latest Code Status on File  
  
                          Code Status  Date Activated Date Inactivated Comments  
   
                          Full Code    2012 4:45 PM 2012 8:39 AM   
  
  
  
                                                                   
   
                          Full Code    2012 4:33 PM 2012 7:14 PM   
  
  
  
Summary Purpose  
  
  
                                                      
  
  
  
Family History  
No Family History Records FoundNo Family History Records FoundNo Family History 
Records FoundNo Family History Records FoundNo Family History Records FoundNo 
Family History Records Found  
  
No data available for this section  
  
  
  
No data available for this section  
  
No Family History Records FoundNo Family History Records Found  
  
Additional Source Comments  
  
  
  
                                                    Care Teams (unrecognized sec  
tion and content)  
   
                                          
  
  
  
                      Team Member Relationship Specialty  Start Date End Date  
   
                                                      
  
  
Maki Veliz MD  
  
  
[address] [place] [address]  
  
  
 (Work)  
  
  
[phone] (Fax) PCP - General                   9/10/12           
  
  
  
                      Team Member Relationship Specialty  Start Date End Date  
   
                                                      
  
  
Maki Veliz MD  
  
  
[address] [place] [address]  
  
  
 (Work)  
  
  
[phone] (Fax) PCP - General                   9/10/12           
  
  
  
                      Team Member Relationship Specialty  Start Date End Date  
   
                                                      
  
  
Maki Veliz MD  
  
  
[address] [place] [address]  
  
  
 (Work)  
  
  
[phone] (Fax) PCP - General                   9/10/12           
  
  
  
                      Team Member Relationship Specialty  Start Date End Date  
   
                                                      
  
  
Maki eVliz MD  
  
  
[address] [id number]  
  
  
 (Work)  
  
  
[phone] (Fax) PCP - General                   9/10/12           
  
  
  
                                                    Team Status: Active   
   
                          Member       Role         Status       Dates  
   
                          Maki Veliz MD Primary Care Provider Active         
  
  
  
                                                    Team Status: Inactive   
   
                          Member       Role         Status       Dates  
   
                          Maki Veliz MD Primary Care Provider Active         
Start: 2024  
End: 2024  
   
                          Oh Salmeron MD [facility] Attending Provider Active      
   Start: 2024  
End: 2024  
  
  
  
  
  
                                                    INFORMATION SOURCE (unrecogn  
ized section and content)  
   
                                          
  
  
  
                                        DATE CREATED        AUTHOR  
   
                                2022                      [facility]  
  
  
  
                                DATE CREATED    AUTHOR          AUTHOR'S ORGANIZ  
ATION  
   
                                2023                      [facility]  
[facility]  
  
  
  
                                DATE CREATED    AUTHOR          AUTHOR'S ORGANIZ  
ATION  
   
                                2023                      [facility]  
pital  
  
  
  
                                DATE CREATED    AUTHOR          AUTHOR'S ORGANIZ  
ATION  
   
                                10/06/2023                      [facility]  
  
  
  
                                DATE CREATED    AUTHOR          AUTHOR'S ORGANIZ  
ATION  
   
                                2023                      [facility]  
  
  
  
                                DATE CREATED    AUTHOR          AUTHOR'S ORGANIZ  
ATION  
   
                                2023                      [facility]  
[facility]  
  
  
  
                                DATE CREATED    AUTHOR          AUTHOR'S ORGANIZ  
ATION  
   
                                2024                      [facility]  
  
  
  
                                DATE CREATED    AUTHOR          AUTHOR'S ORGANIZ  
ATION  
   
                                2024                      [facility]  
  
  
  
  
  
                                                    Goals (unrecognized section   
and content)  
   
                                                    Goals may be documented in a  
n alternate section  
  
FOR RECORDS PERTAINING TO PATIENTS WHO ARE OR HAVE BEEN ENROLLED IN A CHEMICAL 
DEPENDENCY/SUBSTANCEABUSE PROGRAM, SOME INFORMATION MAY BE OMITTED. This 
clinical summary was aggregated from multiple sources. Caution should be 
exercised in using it in the provision of clinical care. This summary normalizes
 information from multiple sources, and as a consequence, information in this 
document may materially change the coding, format and clinical context of 
patient data. In addition, data may be omitted in some cases. CLINICAL DECISIONS
 SHOULD BE BASED ON THE PRIMARY CLINICAL RECORDS. [email or web address] [email or web address]. provides
 no warranty or guarantee of the accuracy or completeness of information in this
 document.

## 2024-02-23 NOTE — XMS_ITS
Comprehensive CCD (C-CDA v2.1)  
  
                          Created on: 2024  
  
  
SHERRIE LOZADA  
External Reference #: [id number]  
: 1955  
Sex: Female  
  
Demographics  
  
  
                                        Address             [address] [place] [address]  [address]  
   
                                        Home Phone          [phone]  
   
                                        Home Phone          [phone]  
   
                                        Work Phone          [phone]  
   
                                        Work Phone          [phone]  
   
                                        Preferred Language  en  
   
                                        Marital Status      Single  
   
                                        Pentecostalism Affiliation Unknown  
   
                                        Race                White  
   
                                        Ethnic Group        Not  or Lati  
no  
  
  
Author  
  
  
                                        Name                Unknown  
   
                                        Address             [address]  
[address]  
[place]  [address]  
   
                                        Phone               [phone]  
   
                                        Organization        [facility]  
  
  
Care Team Providers  
  
  
                                Care Team Member Name Role            Phone  
   
                                Maki Vleiz MD Primary Care Provider [phone]  
[phone]  
   
                                MAKI VELIZ  Referring       Unavailable  
   
                                MAKI VELIZ  Primary Care    Unavailable  
   
                                Maki Veliz MD Primary Care Provider [phone]  
[phone]  
   
                                Maki Veliz MD Primary Care Provider [phone]  
[phone]  
   
                                MAKI VELIZ  Primary Care    Unavailable  
   
                                REGGIE OCAMPO  Referring       Unavailable  
   
                                REGGIE OCAMPO  Referring       Unavailable  
   
                                MAKI VELIZ  Primary Care    Unavailable  
   
                                MAKI VELIZ  Primary Care    Unavailable  
   
                                MAKI VELIZ  Referring       Unavailable  
   
                                MAKI VELIZ  Primary Care    Unavailable  
   
                                SAMEER HAGAN. Referring       Unavailable  
   
                                MAKI VELIZ  Primary Care    Unavailable  
   
                                REGGIE OCAMPO  Referring       Unavailable  
   
                                HAGAN ., DR SAMEER MERLOS Admitting       Unavailable  
   
                                HAGAN ., DR SAMEER MERLOS Attending       Unavailable  
   
                                MAG ., DR GAMBINO Primary Care    Unavailable  
   
                                HAGAN ., DR SAMEER MERLOS Admitting       Unavailable  
   
                                HAGAN ., DR SAMEER MEROLS Attending       Unavailable  
   
                                MAG ., DR GAMBINO Primary Care    Unavailable  
   
                                HAGAN ., DR SAMEER MERLOS Consulting      Unavailable  
   
                                HAGAN ., DR SAMEER MERLOS Admitting       Unavailable  
   
                                HAGAN ., DR SAMEER MERLOS Attending       Unavailable  
   
                                MAG ., DR GAMBINO Primary Care    Unavailable  
   
                                DANYA ., DR SAMEER MERLOS Consulting      Unavailable  
   
                                CHERIE ROBERT     Consulting      Unavailable  
   
                                GENNARO MOSS Consulting      Unavailable  
   
                                HAGAN ., DR SAMEER MERLOS Admitting       Unavailable  
   
                                HAGAN ., DR SAMEER MERLOS Attending       Unavailable  
   
                                MAG ., DR GAMBINO Primary Care    Unavailable  
   
                                OCAMPO ., MR REGGIE Admitting       Unavailable  
   
                                OCAMPO ., MR REGGIE Attending       Unavailable  
   
                                MAG ., DR GAMBINO Primary Care    Unavailable  
   
                                DAVID, DR MARV ALLEN Consulting      Unavailable  
   
                                OCAMPO ., MR REGGIE Consulting      Unavailable  
   
                                HAGAN ., DR SAMEER MERLOS Admitting       Unavailable  
   
                                HAGAN ., DR SAMEER MERLOS Attending       Unavailable  
   
                                HOY ., DR GAMBINO Primary Care    Unavailable  
   
                                HAGAN ., DR SAMEER MERLOS Consulting      Unavailable  
   
                                CHERIE ROBERT     Consulting      Unavailable  
   
                                HAGAN ., DR SAMEER MERLOS Admitting       Unavailable  
   
                                HAGAN ., DR SAMEER MERLOS Attending       Unavailable  
   
                                HOY ., DR GAMBINO Primary Care    Unavailable  
   
                                MURRAY ., JAI   Consulting      Unavailable  
   
                                LAKSHMIPATHY ., NARENDRANATH Admitting       Ann Marie  
vailable  
   
                                LAKSHMIPATHY ., NARENDRANATH Attending       Ann Marie  
vailable  
   
                                HOY ., DR GAMBINO Primary Care    Unavailable  
   
                                LAKSHMIPATHY ., NARENDLUCILLE Consulting      Ann Marie  
vailable  
   
                                HAGAN ., DR SAMEER MERLOS Admitting       Unavailable  
   
                                HAGAN ., DR SAMEER MERLOS Attending       Unavailable  
   
                                HOY ., DR GAMBINO Primary Care    Unavailable  
   
                                HAGAN ., DR SAMEER MERLOS Consulting      Unavailable  
   
                                MURRAY ., JAI   Consulting      Unavailable  
   
                                LAKSHMIPATHY ., NARENDRANATH Admitting       Ann Marie  
vailable  
   
                                LAKSHMIPATHY ., NARENDKEDARATH Attending       Ann Marie  
vailable  
   
                                HOY ., DR GAMBINO Primary Care    Unavailable  
   
                                ELAYNE CHANEY Consulting      Unavailable  
   
                                LAKSHMIPATHY ., NARENDRANATH Consulting      Ann Marie  
vailable  
   
                                LAKSHMIPATHY ., NARENDRANATH Admitting       Ann Marie  
vailable  
   
                                LAKSHMIPATHY ., NARENDKEDARATH Attending       Ann Marie  
vailable  
   
                                HOY ., DR GAMBINO Primary Care    Unavailable  
   
                                TANISHA MEDELLIN Consulting      Unavailable  
   
                                LAKSHMIPATHY ., NARENDKEDARATH Consulting      Ann Marie  
vailable  
   
                                HOY ., DR GAMBINO Primary Care    Unavailable  
   
                                LAKSHMIPATHY ., NARENDRANATH Admitting       Ann Marie  
vailable  
   
                                LAKSHMIPATHY ., NARENDRANATH Attending       Ann Marie  
vailable  
   
                                LAKSHMIPATHY ., NARENDRANATH Consulting      Ann Marie  
vailable  
   
                                HAGAN ., DR SAMEER MERLOS Admitting       Unavailable  
   
                                HAGAN ., DR SAMEER MERLOS Attending       Unavailable  
   
                                HOY ., DR GAMBINO Primary Care    Unavailable  
   
                                MURRAY ., JAI   Consulting      Unavailable  
   
                                LAKSHMIPATHY ., NARENDRANATH Admitting       Ann Marie  
vailable  
   
                                LAKSHMIPATHY ., NARENDRANATH Attending       Ann Marie  
vailable  
   
                                HOY ., DR GAMBINO Primary Care    Unavailable  
   
                                LAKSHMIPATHY ., NARENDRANATH Admitting       Ann Marie  
vailable  
   
                                LAKSHMIPATHY ., NARENDRANATH Attending       Ann Marie  
vailable  
   
                                HOY ., DR GAMBINO Primary Care    Unavailable  
   
                                LAKSHMIPATHY ., NARENDRANATH Consulting      Ann Marie  
vailable  
   
                                HOY ., DR GAMBINO Admitting       Unavailable  
   
                                HOY ., DR GAMBINO Attending       Unavailable  
   
                                HOY ., DR GAMBINO Primary Care    Unavailable  
   
                                HOY ., DR GAMBINO Consulting      Unavailable  
   
                                HAGAN ., DR SAMEER MERLOS Admitting       Unavailable  
   
                                HAGAN ., DR SAMEER MERLOS Attending       Unavailable  
   
                                HOY ., DR GAMBINO Primary Care    Unavailable  
   
                                HAGAN ., DR SAMEER MERLOS Consulting      Unavailable  
   
                                ZAC MARS      Unavailable  
   
                                HOY ., DR GAMBINO Admitting       Unavailable  
   
                                HOY ., DR GAMBINO Attending       Unavailable  
   
                                HOY ., DR GAMBINO Primary Care    Unavailable  
   
                                HOY ., DR GAMBINO Consulting      Unavailable  
   
                                HAGAN ., DR SAMEER MERLOS Admitting       Unavailable  
   
                                HAGAN ., DR SAMEER MERLOS Attending       Unavailable  
   
                                HOY ., DR GAMBINO Primary Care    Unavailable  
   
                                MURRAY ., JAI   Consulting      Unavailable  
   
                                HOY ., DR GAMBINO Primary Care    Unavailable  
   
                                MEAGAN ., MAGALY Admitting       Unavailable  
   
                                MEAGAN ., MAGALY Attending       Unavailable  
   
                                LANE, DR ZAC WELLS Consulting      Unavailable  
   
                                MEAGAN ., MAGALY Consulting      Unavailable  
   
                                ALICIA MICHEL Consulting      Unavailable  
   
                                HOY ., DR GAMBINO Admitting       Unavailable  
   
                                HOY ., DR GAMBINO Attending       Unavailable  
   
                                HOY ., DR GAMBINO Primary Care    Unavailable  
   
                                HOY ., DR GAMBINO Consulting      Unavailable  
   
                                SANG COULTER    Consulting      Unavailable  
   
                                HAGAN ., DR SAMEER MERLOS Admitting       Unavailable  
   
                                HAGAN ., DR SAMEER MERLOS Attending       Unavailable  
   
                                HOY ., DR GAMBINO Primary Care    Unavailable  
   
                                MURRAY ., JAI   Consulting      Unavailable  
   
                                HOY ., DR GAMBINO Admitting       Unavailable  
   
                                HOY ., DR GAMBINO Attending       Unavailable  
   
                                HOY ., DR GAMBINO Primary Care    Unavailable  
   
                                HOY ., DR GAMBINO Consulting      Unavailable  
   
                                Jose Abraham Admitting       Unavail  
able  
   
                                MAKI VELIZ    Primary Care    Unavailable  
   
                                Jose Abraham Attending       Unavail  
able  
   
                                MAKI VELIZ    Primary Care    Unavailable  
   
                                Jose Abraham Attending       Unavail  
able  
   
                                Jose Abraham Admitting       Unavail  
able  
   
                                Carmita FAJARDO, Delmis Dowd Attending         
Unavailable  
   
                                JOSE ABRAHAM Attending       Unavailable  
   
                                Maki Veliz    Primary Care Physician [phone]  
   
                                Krys Pritchett Primary Care Physician Krys Butts Unavailable     MD Maki Lea Primary Care Provider [phone]  
3  
   
                                MD Oh Salmeron Attending Provider [phone]  
   
                                Maki Veliz  Primary Care    Unavailable  
   
                                Oh Salmeron Attending       Unavailable  
   
                                Oh Salmeron Admitting       Unavailable  
   
                                Oh SALMERON Attending       Unavailable  
   
                                Maki Veliz    Referring       Unavailable  
   
                                Oh SALMERON Attending       Unavailable  
   
                                Oh SALMERON Attending       Unavailable  
   
                                Maki Veliz    Referring       Unavailable  
  
  
  
Allergies  
  
  
                                                    Allergy   
Classification                          Reported   
Allergen(s)               Allergy Type              Date of   
Onset                     Reaction(s)               Facility  
   
                                                      
(8 sources)                             Acetaminophen /   
HYDROcodone;   
Translations:   
[acetaminophen-hy  
drocodone]                Drug Allergy                
1                                       Itching,   
Itching   
(finding)                               [facility] [facility] [employer]  
Work Phone:   
[phone]  
1  
   
                                                      
(8 sources)                             Acetaminophen /   
oxyCODONE;   
Translations:   
[acetaminophen-ox  
ycodone]                  Drug Allergy                
1                                       Itching,   
Itching   
(finding)                               [employer] Phone:   
[phone]  
1  
   
                                                      
(13 sources)                            Latex;   
Translations:   
[Latex]                                 Propensity to   
adverse   
reactions to   
drug                                      
1                                       Other (See   
Comments),   
Eruption of   
skin   
(disorder)                              [facility]  
   
                                                      
(13 sources)                            nickel sulfate;   
Translations:   
[Nickel]                  Drug Allergy                
4                                       Eruption of   
skin   
(disorder)                              [facility]  
   
                                                      
(12 sources)                            Penicillins;   
Translations:   
[Penicillins]                           Propensity to   
adverse   
reactions to   
drug                                      
1                         Rash                      [employer]  
Work Phone:   
[phone]  
1  
   
                                                      
(4 sources)                             Acetaminophen /   
HYDROcodone;   
Translations:   
[Vicodin]                 Drug Allergy                
3                                                   [facility]   
Repository  
   
                                                      
(4 sources)                             Acetaminophen /   
oxyCODONE;   
Translations:   
[Percocet]                Drug Allergy                
3                                                   [facility]   
Repository  
   
                                                      
(2 sources)               Latex                     Drug allergy   
(disorder)                                
3                                                   [facility]   
Repository  
   
                                                      
(1 source)          Leucine             Drug Allergy        10-  
3                                                   [facility]   
Repository  
   
                                                      
(4 sources)                             Penicillin;   
Translations:   
[penicillin]        Drug Allergy                            Eruption of   
skin   
(disorder)                              [facility]   
Repository  
   
                                                      
(3 sources)                             HYDROcodone;   
Translations:   
[HYDROcodone]             Drug Allergy                
3                                                   [facility]   
Repository  
   
                                                      
(2 sources)                             Acetaminophen;   
Translations:   
[acetaminophen]           Drug Allergy                
3                                                   [facility]  
   
                                                      
(1 source)                              homatropine /   
HYDROcodone;   
Translations:   
[Hydrocodone   
Compound]       Drug Allergy                                    [facility]   
[facility]  
Work Phone:   
[phone]  
   
                                                      
(2 sources)                             oxyCODONE;   
Translations:   
[oxycodone]               Drug Allergy                
3                                                   [facility]  
   
                                                      
(1 source)          Acetaminophen       Drug Allergy          
3                                                   [facility]   
Repository  
   
                                                      
(1 source)                Latex                     Drug allergy   
(disorder)                                
3                                                   [facility]   
Repository  
   
                                                      
(1 source)          nickel              Drug Allergy          
3                                                   [facility]   
Repository  
   
                                                      
(1 source)          oxyCODONE           Drug Allergy          
3                                                   [facility]   
Repository  
  
  
  
Medications  
Current Medications  
  
  
  
                      Medication Drug Class(es) Dates      Sig (Normalized) Sig   
(Original)  
   
                                                    acetaminophen 300   
mg / codeine   
phosphate 30 mg   
oral tablet  
(6 sources)               Opioid Agonist            Start:   
2014                              take 1-2 tablets   
by mouth every   
four hours as   
needed                                  acetaminophen-cod  
eine   
(TYLENOL/CODEINE   
#3) 300-30 MG per   
tablet   
Indications: Hip   
pain, bilateral   
Take 1-2 tablets   
by mouth every 4   
hours as needed.   
50 tablet 3   
2014 Active  
   
                                                    Acidophilus   
Probiotic Blend  
(2 sources)                                         Start:   
2024                                          Acidophilus   
Probiotic Blend   
Refill(s) 0 Start   
Date: 24   
Status: Ordered  
   
                                                    ALPRAZolam 0.5 mg   
oral tablet  
(6 sources)               Benzodiazepine            Start:   
2015                              take 1 tablet by   
mouth three times   
daily as needed   
for anxiety                             ALPRAZolam   
(XANAX) 0.5 MG   
tablet   
Indications:   
Generalized   
anxiety disorder   
Take 1 tablet by   
mouth 3 times   
daily as needed   
for Sleep or   
Anxiety 90 tablet   
2 2015   
Active  
   
                                                    aspirin 81 mg   
delayed release   
oral tablet  
(3 sources)                             Platelet Aggregation   
Inhibitor,   
Nonsteroidal   
Anti-inflammatory   
Drug                                    Start:   
2024                              take 1 tablet by   
mouth once daily                        aspirin 81 mg   
Oral EC Tab 81 mg   
= 1 tab(s), Oral,   
Daily, Refills(s)   
0 Start Date:   
24 Status:   
Ordered  
  
  
  
                                                take 1 tablet by mouth once delmi  
y aspirin 81 mg chewable tablet chew 1 tablet   
(81   
mg) by oral route once daily  
  
  
  
                                                    azithromycin 250 mg   
oral tablet  
(6 sources)                             Macrolide   
Antimicrobial                           Start:   
2018                                          azithromycin   
(ZITHROMAX) 250 MG   
tablet Take 1   
tablet by mouth   
daily Take 2   
tablets day 1 Take   
1 tablet days 2-5   
6 tablet 0   
2018 Active  
   
                                                    benazepril   
hydrochloride 10 mg   
oral tablet  
(6 sources)                             Angiotensin   
Converting Enzyme   
Inhibitor                                           take 2   
tablets by   
mouth once   
daily                                   benazepril   
(LOTENSIN) 10 MG   
tablet Take 20 mg   
by mouth daily. 0   
Active  
   
                                                    biotin 5 mg oral   
tablet  
(6 sources)                                                     Biotin 5000 MCG   
TABS Indications:   
Routine   
gynecological   
examination Take   
by mouth. 0 Active  
   
                                                    cinnamon bark 500   
mg oral capsule  
(6 sources)                                                 take 4   
capsules by   
mouth once   
daily                                   Cinnamon (GNP   
CINNAMON) 500 MG   
CAPS Take 2,000 mg   
by mouth daily. 0   
Active  
   
                                                    COCONUT OIL PO  
(6 sources)                                                     COCONUT OIL PO   
Indications:   
Routine   
gynecological   
examination Take   
by mouth. 0 Active  
   
                                                    Cranberry   
preparation  
(2 sources)                             Non-Standardized   
Food Allergenic   
Extract,   
Non-Standardized   
Plant Allergenic   
Extract                                 Start:   
2024                                          Cranberry   
Refill(s) 0 Start   
Date: 24   
Status: Ordered  
   
                                                    duloxetine 30 mg   
Cap-DR  
(2 sources)                                         Start:   
2024                              take 1   
capsule by   
mouth twice   
daily                                   duloxetine 30 mg   
Cap-DR = 1 cap(s),   
Oral, BID,   
Refills(s) 0 Start   
Date: 24   
Status: Ordered  
   
                                                    Fish Oils  
(8 sources)                                         Start:   
2024                              take 1   
capsule by   
mouth twice   
daily                                   Fish Oil 1200 mg   
oral capsule 1,200   
mg = 1 cap(s),   
Oral, BID,   
Refills(s) 0 Start   
Date: 24   
Status: Ordered  
  
  
  
                                                                FISH OIL 2,400 m  
g by Does not apply route daily. 0 Active  
  
  
  
                                                    hydroCHLOROthiazide 25   
mg oral tablet  
(2 sources)                             Thiazide   
Diuretic                                Start:   
2024                              take 1   
tablet by   
mouth once   
daily                                   hydrochlorothiazide 25   
mg Tab 25 mg = 1   
tab(s), Oral, Daily,   
Refills(s) 0 Start   
Date: 24 Status:   
Ordered  
   
                                                    liothyronine sodium   
0.005 mg oral tablet  
(2 sources)                             l-Triiodothyron  
ine                                     Start:   
2024                              take 1   
tablet by   
mouth once   
daily                                   Cytomel 5 mcg Tab 5 mcg   
= 1 tab(s), Oral,   
Daily, Refills(s) 0   
Start Date: 24   
Status: Ordered  
   
                                                    losartan potassium 25   
mg oral tablet  
(3 sources)                             Angiotensin 2   
Receptor   
Blocker                                 Start:   
2024                              take 1   
tablet by   
mouth once   
daily                                   losartan 25 mg Tab 25   
mg = 1 tab(s), Oral,   
Daily, Refills(s) 0   
Start Date: 24   
Status: Ordered  
  
  
  
                                                take 1 tablet by mouth once delmi  
y losartan 50 mg tablet take 1 tablet (50 mg)   
by   
oral route once daily  
  
  
  
                                                    metFORMIN   
hydrochloride 500 mg   
oral tablet  
(3 sources)         Biguanide           Start: 2024   take 1 tablet   
by mouth   
twice daily                             metformin 500 mg   
Tab 500 mg = 1   
tab(s), Oral,   
BID, Refills(s) 0   
Start Date:   
24 Status:   
Ordered  
   
                                                    metoprolol tartrate   
100 mg oral tablet  
(9 sources)                             beta-Adrenergic   
Blocker                   Start: 2024         take 1 tablet   
by mouth   
twice daily                             metoprolol   
tartrate 100 mg   
Tab 100 mg = 1   
tab(s), Oral,   
BID, Refills(s) 0   
Start Date:   
24 Status:   
Ordered  
  
  
  
                                                take 1 tablet by mouth once delmi  
y metoprolol succinate  mg   
tablet,extended   
release 24 hr take 1 tablet (100 mg) by oral   
routetwice daily  
   
                                                take 2 tablets by mouth once sony  
ly metoprolol (TOPROL-XL) 50 MG XL tablet Take   
100   
mg by mouth daily. 0 Active  
  
  
  
                                                    Multiple   
Vitamins-Minerals (HM   
MULTIVITAMIN ADULT GUMMY)   
CHEW  
(6 sources)                                                     Multiple   
Vitamins-Minerals (HM   
MULTIVITAMIN ADULT   
GUMMY) CHEW Indications:   
Routine gynecological   
examination Take by   
mouth. 0 Active  
   
                                                    pantoprazole 40 mg   
delayed release oral   
tablet  
(9 sources)                             Proton Pump   
Inhibitor                               Start:   
                                     take 1 tablet   
by mouth once   
daily                                   Pantoprazole 40 mg DR   
Tab 40 mg = 1 tab(s),   
Oral, Daily, Refills(s)   
0 Start Date: 24   
Status: Ordered  
  
  
  
                                                take 40 mg by mouth once daily P  
antoprazole Sodium (PROTONIX) 40 MG PACK packet  
   
Take 40 mg by mouth daily. 0 Active  
  
  
  
                                                    simvastatin 20 mg   
oral tablet  
(3 sources)                             HMG-CoA Reductase   
Inhibitor                 Start: 2024         take 1 tablet   
by mouth once   
daily in the   
evening                                 simvastatin 20 mg   
Tab 20 mg = 1   
tab(s), Oral, qPM,   
Refills(s) 0 Start   
Date: 24   
Status: Ordered  
  
  
  
                                                            take 1 tablet by taryn  
th once daily in the   
evening                                 simvastatin 40 mg tablet take 1 tablet (  
40   
mg) by oral route once daily in the evening  
  
  
  
                                                    Vitamin D3 2000 intl   
units oral Tab  
(2 sources)                             Start: 2024   take 2 tablets by   
mouth once daily                        Vitamin D3  intl   
units oral Tab = 2   
tab(s), Oral, Daily,   
tab(s), Refills(s) 0   
Start Date: 24   
Status: Ordered  
  
  
  
Completed/Discontinued Medications  
  
  
  
                      Medication Drug Class(es) Dates      Sig (Normalized) Sig   
(Original)  
   
                                                    baclofen 5 mg oral   
tablet  
(1 source)                              gamma-Aminobutyric   
Acid-ergic Agonist                                  take 1 tablet by   
mouth three times   
daily as needed                         baclofen 5 mg   
tablet take 1   
tablet (5 mg) by   
oral route 3 times   
per day prn  
  
  
  
Problems  
Active Problems  
  
  
                                                    Problem   
Classification  Problem         Date            Documented Date Episodic/Chronic  
   
                                                    Anxiety disorders  
(3 sources)                             Anxiety disorder,   
unspecified;   
Translations:   
[Anxiety]                               Onset:   
2023                Chronic  
   
                                                    Congestive heart   
failure;   
nonhypertensive  
(3 sources)                             Unspecified diastolic   
(congestive) heart   
failure; Translations:   
[Acute combined   
systolic and diastolic   
heart failure]                          Onset:   
2022                Chronic  
   
                                                    Diabetes mellitus   
without complication  
(3 sources)                             Type 2 diabetes   
mellitus without   
complications;   
Translations:   
[Diabetes mellitus]                     Onset:   
2022                Chronic  
   
                                                    Diseases of white   
blood cells  
(4 sources)                             Elevated white blood   
cell count,   
unspecified;   
Translations:   
[ELEVATED WHITE BLOOD   
CELL COUNT UNS]                         Onset:   
2022                                          Chronic  
   
                                                    Disorders of lipid   
metabolism  
(5 sources)                             Pure   
hypercholesterolemia,   
unspecified;   
Translations:   
[Hypercholesterolemia]                  Onset:   
2023                Chronic  
   
                                                    Esophageal disorders  
(3 sources)                             Gastro-esophageal   
reflux disease without   
esophagitis;   
Translations:   
[Gastroesophageal   
reflux disease]                         Onset:   
2023                Chronic  
   
                                                    Essential   
hypertension  
(3 sources)                             Essential (primary)   
hypertension;   
Translations:   
[Hypertensive   
disorder]                               Onset:   
2023                Chronic  
   
                                                    Fluid and electrolyte   
disorders  
(1 source)                              Dehydration;   
Translations:   
[DEHYDRATION]                           Onset:   
2023                                          Episodic  
   
                                                    Hypertension with   
complications and   
secondary   
hypertension  
(1 source)                              Hypertensive heart   
disease with heart   
failure; Translations:   
[HTN HEART DISEASE   
W/HEART FAIL]                           Onset:   
2022                                          Chronic  
   
                                                    Mood disorders  
(1 source)                              Mood disorders;   
Translations:   
[DEPRESSION   
UNSPECIFIED]                            Onset:   
2023                                            
   
                                                    Other aftercare  
(1 source)                              Long term (current)   
use of aspirin;   
Translations: [LONG   
TERM CURRENT USE OF   
ASPIRIN]                                Onset:   
2023                                          Episodic  
   
                                                    Other aftercare  
(1 source)                              Other long term   
(current) drug   
therapy; Translations:   
[OTH LONG TERM CURRENT   
DRUG THERAPY]                           Onset:   
2023                                          Episodic  
   
                                                    Other aftercare  
(1 source)                              Long term (current)   
use of oral   
hypoglycemic drugs;   
Translations: [LONG   
TERM USE ORAL   
HYPOGLYCEMIC DX]                        Onset:   
2023                                          Episodic  
   
                                                    Other connective   
tissue disease  
(1 source)                              Presence of right   
artificial hip joint;   
Translations:   
[PRESENCE RIGHT   
ARTIFICIAL HIP JOINT]                   Onset:   
2023                                          Chronic  
   
                                                    Other connective   
tissue disease  
(4 sources)                             Neuralgia and   
neuritis, unspecified;   
Translations:   
[NEURALGIA AND   
NEURITIS UNSPECIFIED]                   Onset:   
2023                                          Episodic  
   
                                                    Other connective   
tissue disease  
(4 sources)                             Bicipital tendinitis,   
left shoulder;   
Translations:   
[BICIPITAL TENDINITIS   
LEFT SHOULDER]                          Onset:   
2023                                          Episodic  
   
                                                    Other connective   
tissue disease  
(1 source)                              Bicipital tendinitis,   
right shoulder;   
Translations:   
[BICIPITAL TENDINITIS   
RIGHT SHOULDER]                         Onset:   
2023                                          Episodic  
   
                                                    Other connective   
tissue disease  
(1 source)                Pain in right leg         Onset:   
02-                                          Episodic  
   
                                                    Other connective   
tissue disease  
(1 source)                Pain in left leg          Onset:   
02-                                          Episodic  
   
                                                    Other nervous system   
disorders  
(4 sources)                             Other specified   
mononeuropathies of   
right lower limb;   
Translations: [OTH   
SPEC MONONEUROPATH RT   
LOW LIMB]                               Onset:   
2023                                          Chronic  
   
                                                    Other nervous system   
disorders  
(2 sources)                             Other chronic pain;   
Translations: [OTHER   
CHRONIC PAIN]                           Onset:   
2022                                          Chronic  
   
                                                    Other nervous system   
disorders  
(1 source)                              Other specified   
mononeuropathies;   
Translations: [OTHER   
SPECIFIED   
MONONEUROPATHIES]                       Onset:   
04-                                          Chronic  
   
                                                    Other nervous system   
disorders  
(1 source)                              Chronic pain syndrome;   
Translations: [CHRONIC   
PAIN SYNDROME]                          Onset:   
2023                                          Chronic  
   
                                                    Other nervous system   
disorders  
(1 source)                              Other disturbances of   
skin sensation                          Onset:   
02-                                          Episodic  
   
                                                    Other non-traumatic   
joint disorders  
(3 sources)                             Pain in right hip;   
Translations: [Pain in   
right hip]                              Onset:   
2022                                          Episodic  
   
                                                    Other non-traumatic   
joint disorders  
(4 sources)                             Pain in left hip;   
Translations: [PAIN IN   
LEFT HIP]                               Onset:   
2023                                          Episodic  
   
                                                    Other nutritional;   
endocrine; and   
metabolic disorders  
(1 source)                              Obesity, unspecified;   
Translations: [OBESITY   
UNSPECIFIED]                            Onset:   
2023                                          Chronic  
   
                                                    Other nutritional;   
endocrine; and   
metabolic disorders  
(1 source)                              Body mass index (BMI)   
40.0-44.9, adult;   
Translations: [BODY   
MASS INDEX BMI   
40.0-44.9 ADULT]                        Onset:   
2023                                          Chronic  
   
                                                    Other nutritional;   
endocrine; and   
metabolic disorders  
(2 sources)                             Body mass index 30+ -   
obesity                                 2024          Chronic  
   
                                                    Other nutritional;   
endocrine; and   
metabolic disorders  
(2 sources)     Morbid obesity                  2024      Chronic  
   
                                                    Other nutritional;   
endocrine; and   
metabolic disorders  
(2 sources)     Obese                           2024      Chronic  
   
                                                    Other screening for   
suspected conditions   
(not mental disorders   
or infectious   
disease)  
(4 sources)                             Abnormal results of   
kidney function   
studies; Translations:   
[ABNORM RESULTS KIDNEY   
FUNCTION STDY]                          Onset:   
2023                                          Episodic  
   
                                                    Other skin disorders  
(2 sources)                             Epidermoid cyst;   
Translations:   
[Epidermal cyst]                        Onset:   
2024                                          Episodic  
   
                                                    Other skin disorders  
(2 sources)                             Epidermoid cyst of   
skin of neck                            2024          Episodic  
   
                                                    Spondylosis;   
intervertebral disc   
disorders; other back   
problems  
(7 sources)                             Other intervertebral   
disc degeneration,   
lumbar region;   
Translations:   
[Spondylosis without   
myelopathy or   
radiculopathy, lumbar   
region]                                 Onset:   
2022                                          Chronic  
   
                                                    Spondylosis;   
intervertebral disc   
disorders; other back   
problems  
(11 sources)                            Sacrococcygeal   
disorders, not   
elsewhere classified;   
Translations: [Spinal   
stenosis, site   
unspecified]                            Onset:   
10-                                          Episodic  
   
                                                    Syncope  
(4 sources)                             Syncope and collapse;   
Translations: [SYNCOPE   
AND COLLAPSE]                           Onset:   
2023                                          Episodic  
   
                                                    Thyroid disorders  
(2 sources)     Hypothyroidism                  2024      Chronic  
   
                                                    Unclassified  
(4 sources)                             LOW BACK PAIN,   
UNSPECIFIED;   
Translations: [LOW   
BACK PAIN,   
UNSPECIFIED]                            Onset:   
2022                                            
   
                                                    Urinary tract   
infections  
(1 source)                              Urinary tract   
infection, site not   
specified;   
Translations: [UTI   
SITE NOT SPECIFIED]                     Onset:   
2023                                          Episodic  
  
  
Past or Other Problems  
  
  
                      Problem Classification Problem    Date       Documented Da  
te Episodic/Chronic  
   
                                                    Deficiency and other   
anemia  
(1 source)                              Anemia,   
unspecified;   
Translations:   
[ANEMIA   
UNSPECIFIED]                            Onset:   
2022                                          Episodic  
   
                                                    Malaise and fatigue  
(1 source)                              Other fatigue;   
Translations:   
[OTHER FATIGUE]                         Onset:   
2022                                          Episodic  
   
                                                    Other connective tissue   
disease  
(1 source)                              Other muscle   
spasm;   
Translations:   
[OTHER MUSCLE   
SPASM]                                  Onset:   
2022                                          Episodic  
   
                                                    Unclassified  
(1 source)                              LOW BACK PAIN,   
UNSPECIFIED;   
Translations:   
[LOW BACK PAIN,   
UNSPECIFIED]                            Onset:   
2023                                            
  
  
  
Results  
  
  
                          Test Name    Value        Interpretation Reference   
Range                                   Facility  
   
                                                    Pathology Noteon 2024   
   
                                        Pathology Note      [id number]  
20  
[id number]#1.0  
[id number]               Normal                                  [facility]  
   
                                                    Ambulatory Visit Summaryon 0  
2024   
   
                                                    Ambulatory Visit   
Summary                                 [Image Removed]  
SHERRIE LOZADA  
:1955  
MRN:[id number]  
Visit Date:2024  
Ambulatory Visit   
Instructions  
Your Care Team  
Attending Physician -  
Oh SALMERON MD  
Primary Care Physician   
-  
Maki Veliz MD  
Referring Physician -  
Maki Veliz MD  
This Is Your   
Medications List  
aspirin (aspirin 81 mg   
Oral EC Tab)  
cholecalciferol   
(Vitamin D3 2000 intl   
units oral Tab)  
cranberry (Cranberry)  
duloxetine (duloxetine   
30 mg Cap-DR)  
hydrochlorothiazide   
(hydrochlorothiazide   
25 mg Tab)  
lactobacillus   
acidophilus   
(Acidophilus Probiotic   
Blend)  
liothyronine (Cytomel   
5 mcg Tab)  
losartan (losartan 25   
mg Tab)  
metformin (metformin   
500 mg Tab)  
metoprolol (metoprolol   
tartrate 100 mg Tab)  
omega-3   
polyunsaturated fatty   
acids (Fish Oil 1200   
mg oral capsule)  
pantoprazole   
(Pantoprazole 40 mg DR   
Tab)  
simvastatin   
(simvastatin 20 mg   
Tab)  
Procedures Performed  
Arthroplasty of left   
hip, Arthroplasty of   
left shoulder,   
Arthroplasty of right   
hip, Cholecystectomy,   
Colonoscopy,   
Cystectomy, EGD -   
esophagogastroduodenos  
copy, Excision of   
calcaneal spur,   
Excision of cyst,   
Radiofrequency   
ablation of nerve root   
of lumbar spine using   
fluoroscopic guidance,   
Rotator cuff repair.  
What to do next  
Scheduled Follow-Up   
Appointments  
   
2:00 PM EST  
With: Oh SALMERON MD  
Where: General Surgery   
Nill/Connie Craven  Normal                                  [facility]  
   
                                                    General Surgery Office/Clini  
c Noteon 2024   
   
                                                    General Surgery   
Office/Clinic Note                      Chief Complaint  
in-office excisional   
biopsy  
HPI Staff  
Presents for in-office   
excisional biopsy   
right neck.  
History of Present   
Illness  
patient here for   
excisional biopsy of   
right neck epidermal   
cyst, no change since   
recent evaluation.  
Review of Systems  
ROS - Provider  
Constitutional: no   
fever, no sweats, no   
weight loss.  
Eyes: no glasses, no   
blurred vision, no   
visual loss.  
ENMT: no dentures, no   
hoarseness, no   
swallowing   
difficulties, no   
hearing loss, no ear   
infection(s), no nose   
bleeds.  
Cardiovascular: normal   
blood pressure, no   
chest pain, regular   
heartbeat, no heart   
murmur.  
Respiratory: no   
shortness of breath,   
no cough, no asthma,   
no wheezing.  
Gastrointestinal: no   
nausea, no vomiting,   
no diarrhea, no   
constipation, no blood   
in stool, no change in   
bowel habits, no   
abdominal pain, no   
hepatitis.  
Genitourinary: no   
kidney stones, no   
urine infection, no   
dysuria.  
Musculoskeletal: no   
pain, no weakness.  
Skin: no changing   
moles, no rash, yes   
skin lumps.  
Neurologic: no   
seizures, no epilepsy,   
no headache.  
Psychiatric: no   
emotional or   
psychiatric problem.  
Heme/Lymph: no   
bleeding problems, no   
anemia, no blood   
clots, no   
transfusions.  
Allergy/Immunologic:   
no swollen lymph   
nodes/glands, no IV   
drug abuse.  
Other:  
Additional ROS info:   
Except as noted in the   
above Review of   
Systems and in the   
History of Present   
Illness, all other   
systems have been   
reviewed and are   
negative or   
noncontributory.  
  
Physical Exam  
skin: 1 cm epidermal   
cyst right lateral   
neck, no inflammation   
or drainage.  
Procedure  
patient brought to the   
procedure room, placed   
in supine position,   
area prepped and   
draped in sterile   
fashion; anesthetized   
with 1 % lidocaine;   
lesion excised in   
elliptical fashion   
down to subcutaneous   
fat; closed with   
interrupted 4-0 nylon   
sutures; total length   
of incision 1.3 cm;   
tolerated well; ebl <   
3 ml; sterile dressing   
applied.  
Assessment/Plan  
1. Epidermal cyst of   
neck (L72.0: Epidermal   
cyst)  
excised under local   
anesthesia, tolerated   
well; may shower   
tomorrow, remove   
dressing and leave   
open to air; take   
ibuprofen as needed   
for pain. f/u in 7-10   
days for suture   
removal, call sooner   
if problems/questions.  
Follow-up  
No qualifying data   
available  
Problem List/Past   
Medical History  
Ongoing  
Acute combined   
systolic and diastolic   
heart failure..  
Anxiety  
BMI 35.0-35.9,adult  
Diabetes mellitus  
Epidermal cyst of neck  
Gastroesophageal   
reflux disease  
Hypercholesterolemia  
Hyperlipidemia  
Hypertensive disorder  
Hypothyroidism  
Lumbar spinal stenosis  
Morbid obesity  
Obese  
Historical  
No qualifying data  
Procedure/Surgical   
History  
Arthroplasty of left   
hip, Arthroplasty of   
left shoulder,   
Arthroplasty of right   
hip, Cholecystectomy,   
Colonoscopy,   
Cystectomy, EGD -   
esophagogastroduodenos  
copy, Excision of   
calcaneal spur,   
Excision of cyst,   
Radiofrequency   
ablation of nerve root   
of lumbar spine using   
fluoroscopic guidance,   
Rotator cuff repair.  
Medications  
Acidophilus Probiotic   
Blend  
aspirin 81 mg Oral EC   
Tab, 81 mg= 1 tab(s),   
Oral, Daily  
Cranberry  
Cytomel 5 mcg Tab, 5   
mcg= 1 tab(s), Oral,   
Daily  
duloxetine 30 mg   
Cap-DR, 1 cap(s),   
Oral, BID  
Fish Oil 1200 mg oral   
capsule, 1200 mg= 1   
cap(s), Oral, BID  
hydrochlorothiazide 25   
mg Tab, 25 mg= 1   
tab(s), Oral, Daily  
losartan 25 mg Tab, 25   
mg= 1 tab(s), Oral,   
Daily  
metformin 500 mg Tab,   
500 mg= 1 tab(s),   
Oral, BID  
metoprolol tartrate   
100 mg Tab, 100 mg= 1   
tab(s), Oral, BID  
Pantoprazole 40 mg DR   
Tab, 40 mg= 1 tab(s),   
Oral, Daily  
simvastatin 20 mg Tab,   
20 mg= 1 tab(s), Oral,   
qPM  
Vitamin D3 2000 intl   
units oral Tab, 2   
tab(s), Oral, Daily  
Allergies  
Latex (Skin rash)  
Nickel (Skin rash)  
Percocet (Itching)  
Vicodin (Itching)  
penicillin (Rash)  
Social History  
Alcohol - Denies   
Alcohol Use,   
2024  
Substance Abuse -   
Denies Substance   
Abuse, 2024  
Tobacco  
Former smoker, quit   
more than 30 days ago   
Tobacco Use:. Never   
Smokeless Tobacco   
Use:. Cigarettes, 0.25   
per day. Started age   
17.0 Years. Stopped   
age 54 Years.,   
2024  
Family History  
Dementia: Mother.  
Immunizations  
Vaccine Date Status   
Comments  
influenza virus   
vaccine, inactivated -   
Not Given Patient   
Refuses             Normal                                  [facility]  
   
                                        Comment on above:   Result Comment: Elec  
tronically Signed By: JENELLE FAJARDO, Oh Le\Date and Time Signed: 24 19:27 EST   
   
                                                    [place] 2024   
   
                                        L                   Specimen: [id number]   
Received:   
02/15/24-7 Status:   
DIMITRIOS Jimenez Num: [id number]  
Spec Type: Surgical   
Subm Dr: Oh Salmeron MD FACS  
  
Tissues: A Skin Cyst   
(EPIDERM CYST NCECK)  
Procedures: HE,   
Gross/Micro L3  
  
  
Age/  
Patient Birth Sex   
Location Account   
Attending Physician  
  
  
Sherrie Lozada 68/F   
LABELL [id number]   
Oh Salmeron MD   
FACS  
  
  
  
SPEC NUM: [id number]   
RECD: 02/15/   
STATUS: DIMITRIOS JIMENEZ NUM:   
[id number]  
SALINAS: 24- SUBM   
DR: Oh Salmeron MD   
FACS  
  
ENTERED: 02/15/   
[facility] DR: Richard Craven  
  
SPEC TYPE: Surgical   
DEPT: [facility]  
ENTERED BY: [id number]   
RECV BY: [id number]  
  
ORDERED: HE,   
Gross/Micro L3  
ORDERED: HE,   
Gross/Micro L3  
  
Pathological Diagnosis  
  
Cyst, Right Neck,   
Excision: Epidermal   
Inclusion Cyst.  
  
  
Clinical Information  
  
1 year history of   
epidermal cyst. No   
drainage, erythema or   
tenderness  
  
Gross Description  
  
Received in formalin   
labeled with the   
patient's name, date   
of birth and  R neck    
is a  
multifocally disrupted   
1.0 x 0.9 x 0.8 cm   
white-tan cystic   
nodule with potential   
overlying  
1.0 x 0.6 cm ellipse   
of gray-tan skin. The   
specimen is trisected   
perpendicular to the  
potential skin surface   
to demonstrate a   
cavitary space filled   
with white-gray   
material.  
Entirely submitted in   
one cassette labeled   
A1.  
  
CPT Codes  
  
02600  
----------------------  
----  
  
  
  
  
  
  
----------------------  
----  
Specimen: [id number]   
Received:   
02/15/ Status:   
DIMITRIOS Jimenez Num: [id number]  
Spec Type: Surgical   
Subm Dr: Oh Salmeron MD FACS  
  
Tissues: A Skin Cyst   
(EPIDERM CYST NCECK)  
Procedures: HE,   
Gross/Micro L3  
----------------------  
----  
Patient: Sherrie Lozada [id number]   
(Continued)  
----------------------  
----  
  
  
Signed   
__________(signature   
on file)___________   
Alejandro Perez MD   
24 1047       [facility]  
   
                                                    Ambulatory Visit Summaryon 0  
2024   
   
                                                    Ambulatory Visit   
Summary                                 [Image Removed]  
SHERRIE LOZADA  
:1955  
MRN:[id number]  
Visit Date:2024  
Ambulatory Visit   
Instructions  
Your Care Team  
Attending Physician -  
JENELLE FAJARDO, Oh WELLS  
Primary Care Physician   
-  
Mag FAJARDO, Maki  
Referring Physician -  
Maki Veliz MD  
This Is Your   
Medications List  
Contact prescribing   
physician if questions   
or concerns  
aspirin (aspirin 81 mg   
Oral EC Tab)  
cholecalciferol   
(Vitamin D3 2000 intl   
units oral Tab)  
cranberry (Cranberry)  
duloxetine (duloxetine   
30 mg Cap-)  
hydrochlorothiazide   
(hydrochlorothiazide   
25 mg Tab)  
lactobacillus   
acidophilus   
(Acidophilus Probiotic   
Blend)  
liothyronine (Cytomel   
5 mcg Tab)  
losartan (losartan 25   
mg Tab)  
metformin (metformin   
500 mg Tab)  
metoprolol (metoprolol   
tartrate 100 mg Tab)  
omega-3   
polyunsaturated fatty   
acids (Fish Oil 1200   
mg oral capsule)  
pantoprazole   
(Pantoprazole 40 mg DR   
Tab)  
simvastatin   
(simvastatin 20 mg   
Tab)  
Procedures Performed  
Arthroplasty of left   
hip, Arthroplasty of   
left shoulder,   
Arthroplasty of right   
hip, Cholecystectomy,   
Colonoscopy,   
Cystectomy, EGD -   
esophagogastroduodenos  
copy, Excision of   
calcaneal spur,   
Excision of cyst,   
Radiofrequency   
ablation of nerve root   
of lumbar spine using   
fluoroscopic guidance,   
Rotator cuff repair.  
Discharge Vitals  
Heart Rate   
(Peripheral)  
72  
Respiratory Rate  
16  
Blood Pressure  
130/86  
Height  
162.5 cm  
Height  
64 in  
Weight  
92.8 kg  
Weight  
204.16 lb  
BMI  
35.14  
What to do next  
Scheduled Follow-Up   
Appointments  
 2:00 PM EST  
With: JENELLE FAJARDO, Oh WELLS  
Where: General Surgery   
Jenelle/Connie Craven  Normal                                  [facility]  
   
                                                    Facesheeton 2024   
   
                                        Facesheet           [phone]  
10  
[id number]#1  
.[id number]             [facility]  
   
                                                    Physician Referralon   
024   
   
                                        Physician Referral  [phone]  
10  
[id number]#1.0  
0TIFF               Normal                                  [facility]  
   
                                        Physician Referral  [phone]  
10  
[id number]#1.0  
0TIFF               [facility]  
   
                                                    Coding Queryon 2023   
   
                                        Coding Query        [id number]  
06  
[id number]#1.00  
[facility]  
   
                                                    MAGR Postoperative Recordon   
2023   
   
                                                    MAGR Postoperative   
Record                                  MAGR Phase II Record   
Summary  
Primary Physician:   
Jose Abraham DO  
Case Number:   
[id number]  
Finalized Date/Time:   
23 07:41:18  
Pt. Name: SHERRIE LOZADA/Sex: 1955   
FEMALE  
Med Rec #: [id number]  
Physician: Jose Abraham DO  
Financial #: [id number]  
Pt. Type: O  
Room/Bed: [facility]/1  
Admit/Disch: 23   
08:33:12 -  
23 15:10:00  
Institution:  
Phase II Case Times   
MAGR  
Pre-Care Text:  
Patient is free from   
s/s of injury. Patient   
remains free from   
compromised physical   
state related to   
surgery or  
anesthesia. Patient   
comfort maintained.   
Patient/family   
verbalize   
understanding of   
discharge   
instructions.  
Entry 1  
In PACU II 23   
14:15:00 Discharge   
from PACU 23   
15:00:00  
II  
Last Modified By:   
Shari Storey RN   
23  
07:41:16  
Post-Care Text:  
The patient remains   
free from s/s of   
injury. Patient's   
vital signs stable,   
circulation   
maintained, return to  
preop mental and   
physical status,   
opsite/dressing   
intact, minimal or   
absent nausea and   
vomiting, tolerates po  
intake. Patient   
verbalizes adequate   
pain control.   
Patient/family express   
understanding of   
discharge  
instructions.  
General Comments:  
2 SOUTH PHASE II  
Finalized By: Shari Storey RN  
Document Signatures  
Signed By:  
Shari Storey RN   
23 07:41      [facility]  
   
                                                    Coding Summaryon 2023   
   
                                        Coding Summary      HTMLBase 64   
UjppzlpbKZd2aPr+PGhlYW  
Q+WC3XCHJvG99xyWGifT8j  
J5ZMKJnLZbleYRQVSLoEHc  
PzasDyRW4xpCYlVWBf  
IC8+OG1jXOEkQhtonGRup9  
U8fMS0Q92syt4vWScjeDD5  
PEVjVxNjqrakc0fhgEr8TD  
cuNmluOyBt  
LGFlaL14PTO5nW41Nz67fR  
FbfQDkw1qrwPi4AbLsOUHt  
PQY4vXkaMWzmg2UfHLCsQ4  
5djHVdz7B3  
XAOadAkmoOWrAwNnmTW0lY  
2hYMijypbii7rvtfgxZoj6  
ti64gYPby4K9pTY6E7Xxmw  
O1PKKkgMRz  
VqnnfMPGlG8rivrsp8vfoh  
syPtTpCYJuFVe9KAk6ZCRl  
tSflGuQbIW96HTI7RYVgpk  
DmU0JxUOBo  
mBifExP7f9N8Pc7VJ4USFq  
mnJ5PLSSFAPSazqGZ+PC90  
je67V8CuWxhmYnd4KWSxXZ  
P1oBJ3mU4n  
VMJwZHvlh2K6yJD1E3Mcho  
Pacl9bz9vzTMPrMGjcA25n  
cKJka2Q4BXOdgEX8EMRhcD  
ofJzTueB77  
Oyc+IASncLcnq1BlIyfea8  
phz3yemNl1QyfaFHYtwuMe  
iYjoEUX9p3JaHe6uDXVvnU  
W6bSM9rN0r  
IqHzNuA3HRobM526ClVvvB  
UsGrwyQ42cW8HpwRC+PHRy  
Lql8VHYxeHrlVA7qJ7GiZR  
RpbmctbGVm  
lHutJM5mYBYxkmhjHVLdxD  
0vYEApQ3i2KfJuIeR6JZtv  
I8NzMQEayowfGe13fD8oRx  
WoBiK7GJoq  
R7VzunX4LUTvzAVfAYebCI  
Q8T08kj0C7IXHnZIQkSFI8  
sPA4aZ9czGfggshskKDizP  
sgdmVydGlj  
BRlyYFbbU975ITSmhHjtEa  
NvZGluZyBEYXRlOiAgMDkv  
MjAvMjAyMzwvdGQ+PHRkIH  
T4eYvhTNMl  
wSVdFDdiIg8dkWkqkNguNN  
8wBTEdgvtxANIrzM2mTRQs  
wCMrvQxjXF2yFTYlluwqg5  
37KoYiHUN2  
BTTtuIGlU2YoqN7pTeHgZR  
GjHKYjW6OksPRwQTqoC559  
DNyyOtK2HOJjmdKkJ9JiAQ  
FsaWduOiB0  
o6S4Xl4Qn5TjsoauU5HhmD  
NhHfEdGgliONc9M6WeKwrx  
dHI+EU71SUUsSU97JLt5YE  
N6tEsbQVtq  
EAUwR7IzxG4rFqRxJQDaRK  
RkOyc+PHRhYmxlIHdpZHRo  
UUzrINQgTlEspGkxHJ0zLm  
9yZGVyLWNv  
mHhpsSPzQyGqk7fnXEXmLD  
cnPN0cfNbmQ7UoeOI0IBHy  
t2y5Pf82P39zY7KfqDU+PG  
SivMK6vTC7  
wI0nJlWyWfA4BJrcO668Ye  
QtvSIqSssih0aai5nbwAw7  
MoT4BKStedVjdHceSKM8t6  
VvNv36F21g  
IHdpZHRoPSIxNSUiIHZhbG  
vkod2jxR7cQo3+PGNvbCB3  
fFQ7vP0tXqBbPfH3HCtjI6  
49InRvcCIv  
Giwyo2xua4xvzPw0FfJrBV  
VxviZlxQsuZQT0d6WdUs06  
M1QukRxio3WyZgm1da15kP  
Sbm2S7nZS5  
V3FtUAOychsuzCYstVssIR  
7kTLRuguloOXWhjF3gDVWk  
B9j3EaUdKaH8RXoqC9Koym  
Q8GIWhgAUu  
EFXryTQYkB3prsngz7kxkr  
ehDvUcTWKrGCj2JIx9GZKd  
xRuiFrYxZFF6LrW1ODF3oD  
JjtN7voGbp  
bnjnfT2sZnq+KDS0qYBkoX  
XFEB4fXxjbqMG+PHRkIHN0  
cNzxSRbuSYBgxF1sLLNdN5  
t5HbNeXiK4  
WJtcX0XcjnI3WYJlhYOiON  
TtrFUWpA0rsjzbj4zsmkud  
VlSwCFFwHZy9QOa8DJEtjE  
duOiBsZWZ0  
JzF4WTH3lKExeX9ouDvlbe  
ihnN0sDyi+QmlydGggRGF0  
SXs6A4QcTjr2QUHoyOqaCS  
0ncGFkZGlu  
Vn8yzRxczCsjKN2mXINouy  
ehq269SzQca3jpMBKbvRJx  
EUpeWJA1E97wl2R4TYPwMU  
JnIIL2sKI6  
rT9etXrstxqwtITtkFgtxc  
PxqGzrJLvwQQnzI340POWh  
vZfuVdCvGVr7W7EdLqs4JU  
CwwQqxNY3b  
cJElHVivZn3iaHnziRnkDI  
6rHMPpncaiw870OdSyu0xb  
UVAeyAFpOGrgOXU0L17yx6  
K2CXPmZXRd  
UTZ1aXH0mF6zcWdrmcntnQ  
VmdDsgdmVydGljYWwtYWxp  
Z962CQIjkVygHhFuzIw8Y3  
RrIuf8EYPf  
dRvfDX6leKIuGRliPm1xsE  
oayFxnGD4cVYIkuhkqh049  
AfDpf9wwQWQihCMeTAloBC  
T3V39ox7H8  
RYFfMHAoPGZ1gHJ2yW7bxJ  
lnbjogbGVmdDsgdmVydGlj  
APrwRYvwR047NJPasPtmHh  
BhdGllbnQg  
FLgmCHp9B6KnTaqgfBU+PC  
41PCOhPH94kMKptIKjr8xo  
lAp4DwQxIQAlSQR0iJrpXY  
gxz5ThQAAy  
J92cqHYaf0S2CFEyhTqswP  
AuLeOgtDT6sX1oKVbanngr  
v1lnkwrbJvrjz8bytt39zR  
52E06nFNpf  
ZHRoPSIzMCUiIHZhbGlnbj  
9goK5gYu1+PQDjgHM3mQZ4  
gO2fISZqPfC3PSunU410Dc  
RvcCIvPjxj  
h5wes8dspPn3EiK6YXMvzc  
WkcChrEHK2l9LnLp81J30m  
IHdpZHRoPSIyMCUiIHZhbG  
wxhe7wjN0q  
Ii8+KTOzmKC9eGT4gL5bIm  
HyWbS3NOirD818GaDhoHBw  
LjhcG64iF1BxeTS+PHRyPj  
b6XSXmsFvk  
NL0rtUBnNDtyBg0iEVI2Sy  
WqOpRkWExiY3RfMZYjrsbt  
yqgfdRU9FCCpVUAgdK56Em  
9udDogMTBw  
zRVPjW4rnndkv5tocrfaMl  
OvZQBkZPh1OHz5PEIhnQab  
ZhGqEAT9HsF1RWS8lBQevD  
1hbGlnbjog  
nM8aH1KsLRJsbkziNc27kK  
1oRmNlPtC0DSohAic+Sk9O  
QWMhIHWTUf1HHEbyQEaYHv  
wvdGQ+PHRk  
GSM3aKirQLssOMYlrN2iXA  
JmU3n4RiJlDuY4SPdrZ6Po  
NHJwuacsCi12lD7nFlTeCa  
E3MScsF9Rp  
paE7GLFccCKfSOrsXPG2O4  
1tb0L1SJUeYQDeRAY1xDU5  
rF7vgCmfltqyoGKwjAssvr  
VydGljYWwt  
WRklJ137HKLrwXopLeNuXs  
H4LgY3LDn3D5PcVit8DOYa  
zGjxXW1ezXMnCUiwMj0jhU  
mfxZdzYV7k  
JQTadgiqYOIvmZ8vZINooY  
PdlOjcCI6uIUXrcywwn234  
QmMpMGB8FJHzuAEwK3RbkB  
9yOiAjMDAw  
PUAmR0KbeMLvPLjyS841RO  
cxFaW0XDDfsjAyV5WxHMOb  
sJwgSyM9h5Q3Yy32EIZEHN  
FyczwvdGQ+  
SPIdAMA4jErwOMftABYgbC  
7xIIOsQ7h1RxLqPcD4WVkh  
M9XbNWAfasusPy83sO5uFq  
MtKhR4TQpo  
L8WvnqV6LFVofWWlPPdfPU  
Y9R91ws1A8FSSgRAWyIUO0  
dYV3zX3jeVafkycblMVrkV  
sgdmVydGlj  
WBefWIywR159YULwxQdcAw  
ZFTUFMRTwvdGQ+PHRkIHN0  
fTwmBIrkPNTsxT8hQVNdX6  
u5EgAnGwA9  
GMrjY3AdJYMwkfvxTh95kE  
7mHgNxIpB2ZUzqE9PsrkB2  
BHDojRCcTBbuDMV1O27oz5  
E3UAXeSOPs  
OXE0sUB1aC2sbGinnxdvrT  
VmdDsgdmVydGljYWwtYWxp  
T934KITpsVinQf6df4Bssv  
F3jW9rBM79  
CW88I7KaDjtgkQSnpZV+PH  
RhYmxlIHdpZHRoPScxMDAl  
WgDcwFguGJ9xZc2sWKKbMB  
NvbGxhcHNl  
QzSuo2fpWELcKGqmEM3cmX  
hmA7LsyMT8PGJgw2j8Ee91  
Z31cX4OtkTZ+ZJIaiUK5xG  
X9uS9yChPx  
TfL9FXetD115VlGzdUGcHd  
tav8wlv1wteLj8RdIrKMBi  
ebBxuGdtRJN4x1PgSr84L2  
9sIHdpZHRo  
XVQbWQIxRETdnVodne2xlR  
9wIi8+NXMawJR8wZK7dQ5f  
MjTqLxN0INqiY616MgJmsC  
KkIlfcI03y  
A7UuzKH+NQHuEae8NECuwX  
yzLJ4ezGIgACydAl3vIFX7  
ZwVfZqLqFVojJ0NmDYRvia  
ctcmlnaHQ6  
RODaTWLssD46Sg4lrSqoNu  
5oIQSrTKD0RMQwxJFxR8Cw  
xC1jSmOaMZUeBJNnG4JdwN  
IcFYmsK960  
PIprHsM3IVDdnlYcJ4HxTK  
YhxHgfQhZ6h6K6Ax3BbJjy  
hFPwDL4hBrJpDCp6T9BnIf  
e9NQMoaNea  
MW6ftOTyQKkyMe8vzRxuzG  
heKY4xXTLssryey338IgCn  
k4ieEXVpeIKlBOgrJXD0H3  
3ol2B5IQWy  
YNIlURC3hOE3jB9jwDlsnh  
ogbGVmdDsgdmVydGljYWwt  
VPrvO872GIDbnSxqPwZLBf  
w5Q5WbNzl1  
LTIdkRkqMS5qoUHsPLvuMc  
3jvAdnaGbjJK5sYUUyziqj  
b679BvMgb7vuMLHypDCzJN  
prDOV0M44w  
r5R6OIIuSZVdFNN6xBE3aE  
1hbGlnbjogbGVmdDsgdmVy  
uRxmJVmoBVpjR386GRXpfH  
tcWd5WZiw6  
H7LvCsj2IYMsfAtmUG0leH  
WlUDszZo5yeGyirObxOL7r  
OTDwkgmgf423AwMrc8klUY  
EwcHQgVGlt  
XEK1T40gd0W3SPXeDQPbWR  
F4tSU0mX9zqXoobjflpRSf  
dDsgdmVydGljYWwtYWxpZ2  
46IHRvcDsn  
PlBheWVyOjwvdGQ+PC90cj  
09M3HlLwgxXqj3LUIcXAV8  
pCJ2oE8gQQDlBMqqd5M9wY  
U7N5ZxvgIf  
ci1 (more content not   
included)...        [facility]  
   
                                                    Consent Formson 2023   
   
                                        Consent Forms       [phone]..  
[id number]#1.00  
[facility]  
   
                                                    Discharge Instructionson    
   
                                                    Discharge   
Instructions                            [id number]#1.00  
[facility]  
   
                                                    Outside Recordson 2023  
   
   
                                        Outside Records     [id number]#1.00  
[facility]  
   
                                                    Provider Orderson 2023  
   
   
                                        Provider Orders     [phone]..  
[id number]#1.00  
[facility]  
   
                                                    Telemetry Stripson 09-  
3   
   
                                        Telemetry Strips    [phone].  
  
[id number]#1.0  
[id number]            [facility]  
   
                                                    Consultation/Specialist Note  
on 2023   
   
                                                    Consultation/Specia  
list Note                               Patient: SHERRIE LOZADA MRN:   
[id number] FIN: [id number]  
Age: 67 years Sex:   
FEMALE : 1955  
Associated Diagnoses:   
None  
Author: NIKOLAI SOTO  
Basic Information  
1 day s/p LT reverse   
TSA (DOS 23)  
Subjective  
pt doing well, she   
notes she is looking   
forward to going home   
today, pain is ok at   
this point, she notes   
she is still feeling   
the block at this   
point.  
Review of Systems  
denies nausea  
Health Status  
Allergies:  
Allergic Reactions   
(All)  
Moderate  
Latex- Blister.  
Percocet- Itching.  
Vicodin- Itching.  
Mild  
Nickel- Rash.  
Penicillin- Rash.  
Objective  
dressing the left   
shoulder is clean dry   
and intact, kryo cuff   
intact, arm sling on,   
able to make a fist,   
flex and extend the   
wrist and has a little   
flexion and extension   
of the elbow, nv   
intact distally  
Impression and Plan  
d/c home today, f/U in   
office 7-10 days s/p   
surgery  
[Electronically Signed   
on: 2023 08:48   
EDT]  
______________________  
__________________  
NIKOLAI SOTO   
[Verified on:   
2023 08:48 EDT]  
______________________  
__________________  
NIKOLAI SOTO       [facility]  
   
                                                    Discharge Noteon 2023   
   
                                        Discharge Note      discharge sony rollins   
reviewed with patient   
and daughter,   
belongings packed,   
skin wam and dry, foam   
dressing dry, polar   
care sent home with   
patient, denies pain,   
awaiting wheelchair to   
private vehicle.  
[Electronically Signed   
on: 2023 15:08   
EDT]  
______________________  
__________________  
Pia Carlson RN   
[Verified on:   
2023 15:08 EDT]  
______________________  
__________________  
Pia Carlson RN       [facility]  
   
                                        Discharge Note      patient refusing   
discharge instructions   
at this time, patient   
is choosing to wait   
for daughter to be her   
for instructions, when   
daughter arrives will   
review discharge   
instructions.  
[Electronically Signed   
on: 2023 13:57   
EDT]  
______________________  
__________________  
Pia Carlson RN   
[Verified on:   
2023 13:57 EDT]  
______________________  
__________________  
Pia Carlson RN       [facility]  
   
                                                    Inpatient Patient Summaryon   
2023   
   
                                                    Inpatient Patient   
Summary                                 [place] [address]  
[phone]  
Patient Discharge   
Instructions  
Name: SHERRIE LOZADA  
: 1955 MRN:   
[id number] FIN: [id number]  
Patient Address: [address] [place] [address]  
Primary Care Provider:  
Name: MAKI VELIZ  
Phone: [phone]  
After you are   
discharged if you find   
you have any   
questions, please,   
call [phone] ext   
[phone] to speak to a   
nurse.  
The Pharmacy at   
[facility] is open   
Monday through Friday   
from 9A to 6P and   
Saturday and    
from 9A to 5P  
Discharge Diagnosis:   
Arthritis of left   
glenohumeral joint  
Prescription   
Information: If you   
have been given a   
prescription for   
narcotics, seek   
immediate medical   
attention if you have   
any difficulty   
breathing or any   
sudden status changes   
such as confusion and   
sleepiness.  
If you or anyone you   
know is experiencing   
suicidal thoughts,   
mental health, alcohol   
and/or drug addiction   
problems; contact the   
[facility]   
[facility]    
Crisis Hotline   
1-161.757.3217 -Text   
4HUZI an 242931.  
If you received any   
narcotics, sedation,   
or any other   
medication that causes   
drowsiness for the   
next 24 hours, unless   
otherwise directed:  
? Do not drive a car.  
? Do not operate   
machinery such as   
power tools, lawn   
mowers, drills, sewing   
machines, or stoves  
? Avoid alcoholic   
beverages and drugs   
for allergies, nerves,   
or sleep  
? Do not make   
important personal or   
business decisions or   
sign any legal   
documents  
[facility]   
would like to thank   
you for allowing us to   
assist you with your   
healthcare needs. The   
following includes   
patient education   
materials and   
information regarding   
your injury/illness.  
SHERRIE LOZADA   
has been given the   
following list of   
follow-up   
instructions,   
prescriptions, and   
patient education   
materials:  
Follow-up Instructions  
With: Address: When:  
Jose Abraham [address] [place] [address],   
[address] [place] [address]  
[phone]   
Business (1)   
2023 10:45 AM  
Medications  
During the course of   
your visit, your   
medication list was   
updated with the most   
current information.   
The details of those   
changes are reflected   
below:  
Medications That Were   
Updated - Follow Below   
Instructions  
Other Medications  
Updated: cranberry   
(Cranberry oral   
capsule) 1 tab(s) Oral   
2 times a day.  
Medications to   
Continue That Have Not   
Changed  
Other Medications  
ascorbic acid (Vitamin   
C 1000 mg oral tablet)   
3 tab(s) Oral every   
day.  
aspirin (aspirin 81 mg   
oral delayed release   
tablet) 1 tab(s) Oral   
every day.  
baclofen (baclofen 10   
mg oral tablet) 1   
tab(s) Oral every day.  
biotin (biotin 5000   
mcg oral capsule) 1   
cap(s) Oral every day.  
cholecalciferol   
(Vitamin D3 1000 intl   
units oral tablet) 1   
tab(s) Oral 2 times a   
day.  
garlic (Garlic Oil   
oral capsule) 1 cap(s)   
Oral 2 times a day.  
hydroCHLOROthiazide   
(hydroCHLOROthiazide   
25 mg oral tablet) 1   
tab(s) Oral every day.  
liothyronine   
(liothyronine 5 mcg   
oral tablet) 1 tab(s)   
Oral every day.  
losartan (losartan 25   
mg oral tablet) 1   
tab(s) Oral every day.  
magnesium gluconate   
(magnesium gluconate   
250 mg oral tablet) 1   
tab(s) Oral every day.  
metFORMIN (metFORMIN   
500 mg oral tablet) 1   
tab(s) Oral 2 times a   
day.  
metoprolol (metoprolol   
tartrate 25 mg oral   
tablet) 1 tab(s) Oral   
2 times a day.  
omega-3   
polyunsaturated fatty   
acids (Fish Oil 1000   
mg oral capsule) 1   
cap(s) Oral 2 times a   
day.  
pantoprazole   
(pantoprazole 40 mg   
oral delayed release   
tablet) 1 tab(s) Oral   
every day.  
simvastatin   
(simvastatin 20 mg   
oral tablet) 1 tab(s)   
Oral every day.  
Template Non-Formulary   
(beet root) Oral every   
day.  
Template Non-Formulary   
(veggie and fruit   
tablet) Oral 2 times a   
day.  
traMADol (traMADol 50   
mg oral tablet) 1   
tab(s) Oral Every 12   
hours scheduled time   
as needed as needed   
for pain.  
zinc gluconate (zinc   
(as gluconate) 50 mg   
oral tablet) 1 tab(s)   
Oral every day.  
It is important to   
always keep an active   
list of medications   
available so that you   
can share with other   
providers and manage   
your medications   
appropriately. As an   
additional courtesy,   
we are also providing   
you with your final   
active medications   
list that you can keep   
with you.  
ascorbic acid (Vitamin   
C 1000 mg oral tablet)   
3 tab(s) Oral every   
day.  
aspirin (aspirin 81 mg   
oral delayed release   
tablet) 1 tab(s) Oral   
every day.  
baclofen (baclofen 10   
mg oral tablet) 1   
tab(s) Oral every day.  
biotin (biotin 5000   
mcg oral capsule) 1   
cap(s) Oral every day.  
cholecalciferol   
(Vitamin D3 1000 intl   
units oral tablet) 1   
tab(s) Oral 2 times a   
day.  
cranberry (Cranberry   
oral capsule) 1 tab(s)   
Oral 2 times a day.  
garlic (Garlic Oil   
oral capsule) 1 cap(s)   
Oral 2 times a day.  
hydroCHLOROthiazide   
(hydroCHLOROthiazide   
25 mg oral tablet) 1   
tab(s) Oral every day.  
liothyronine   
(liothyronine 5 mcg   
oral tablet) 1 tab(s)   
Oral every day.  
losartan (losartan 25   
mg oral tablet) 1   
tab(s) Oral every day.  
magnesium gluconate   
(magnesium gluconate   
250 mg oral tablet) 1   
tab(s) Oral every day.  
metFORMIN (metFORMIN   
500 mg oral tablet) 1   
tab(s) Oral 2 times a   
da (more content not   
included)...        [facility]  
   
                                                    Anesthesia Noteon 2023  
   
   
                                        Anesthesia Note     Patient: SHERRIE LOZADA MRN:   
[id number] FIN: [id number]  
Age: 67 years Sex:   
FEMALE : 1955  
Associated Diagnoses:   
None  
Author: Miguel Salgado MD  
Postoperative   
Information  
Post Operative Note:   
Operative Day.  
Anesthetic utilized:   
General.  
Health Status  
Allergies:  
Allergic Reactions   
(All)  
Moderate  
Latex- Blister.  
Percocet- Itching.  
Vicodin- Itching.  
Mild  
Nickel- Rash.  
Penicillin- Rash.  
Problem list:  
All Problems  
Hypertension / SNOMED   
CT 6327694867 /   
Confirmed  
Type 2 diabetes   
mellitus / SNOMED CT   
[id number] / Confirmed  
Physical Examination  
Vital Signs (last 24   
hrs)_____ Last   
Charted___________  
Heart Rate Monitored L   
58 bpm (SEP 18 13:31)  
Resp Rate 16 br/min   
(SEP 18 13:31)  
 mmHg (SEP 18   
13:)  
DBP 68 mmHg (SEP 18   
13:)  
Review / Management  
Condition: Stable.  
Assessment  
Anesthetic outcome  
No anesthetic   
complications noted.  
Adequate pain relief.  
awake, no pain.   
Hydration appears   
adequate.  
No Complaint of nausea   
and vomiting.  
Plan  
Transfer/ Discharge:   
Patient can be   
discharged from PACU   
when criteria met.  
Condition good.  
[Electronically Signed   
on: 2023 13:36   
EDT]  
______________________  
__________________  
Miguel Salgado MD   
[Verified on:   
2023 13:36 EDT]  
______________________  
__________________  
Miguel Salgado MD  [facility]  
   
                                        Anesthesia Note     Patient: SHERRIE LOZADA MRN:   
[id number] FIN: [id number]  
Age: 67 years Sex:   
FEMALE : 1955  
Associated Diagnoses:   
None  
Author: Miguel Salgado MD  
Preoperative   
Information  
Anesthesia history:  
Patient history: No   
difficult intubation,   
No malignant   
hyperthermia.  
Family history: No   
malignant   
hyperthermia.  
Review of Systems  
Constitutional:   
Negative.  
Respiratory: Negative,   
No shortness of   
breath.  
Cardiovascular: No   
chest pain.  
Neurologic: Alert and   
oriented X4.  
Health Status  
Allergies:  
Allergic Reactions   
(All)  
Moderate  
Latex- Blister.  
Percocet- Itching.  
Vicodin- Itching.  
Mild  
Nickel- Rash.  
Penicillin- Rash.  
Current medications:  
Home Medications (20)   
Active  
aspirin 81 mg oral   
delayed release tablet   
81 mg = 1 tab(s), PO,   
Daily  
baclofen 10 mg oral   
tablet 10 mg = 1   
tab(s), PO, Daily  
beet root , PO, Daily  
biotin 5,000 unit(s),   
PO, Daily  
Cranberry oral capsule   
1 tab(s), PO, BID  
Fish Oil 1000 mg oral   
capsule 1,000 mg = 1   
cap(s), PO, BID  
Garlic Oil oral   
capsule 1 cap(s), PO,   
BID  
hydroCHLOROthiazide 25   
mg oral tablet 25 mg =   
1 tab(s), PO, Daily  
liothyronine 5 mcg   
oral tablet 5 mcg = 1   
tab(s), PO, Daily  
losartan 25 mg oral   
tablet 25 mg = 1   
tab(s), PO, Daily  
magnesium gluconate   
250 mg oral tablet 250   
mg = 1 tab(s), PO,   
Daily  
metFORMIN 500 mg oral   
tablet 500 mg = 1   
tab(s), PO, BID  
metoprolol tartrate 25   
mg oral tablet 25 mg =   
1 tab(s), PO, BID  
pantoprazole 40 mg   
oral delayed release   
tablet 40 mg = 1   
tab(s), PO, Daily  
simvastatin 20 mg oral   
tablet 20 mg = 1   
tab(s), PO, Daily  
traMADol 50 mg oral   
tablet 50 mg = 1   
tab(s), PRN, PO, q12hr  
veggie and fruit   
tablet , PO, BID  
Vitamin C 1000 mg oral   
tablet 3,000 mg = 3   
tab(s), PO, Daily  
Vitamin D3 1000 intl   
units oral tablet 25   
mcg = 1 tab(s), PO,   
BID  
zinc (as gluconate) 50   
mg oral tablet 50 mg =   
1 tab(s), PO, Daily  
Problem list:  
All Problems  
Hypertension / SNOMED   
CT [id number] /   
Confirmed  
Type 2 diabetes   
mellitus / SNOMED CT   
481898514 / Confirmed  
Histories  
Family History:  
Entire family history   
is negative.  
Procedure history:  
Cholecystectomy   
(75502509).  
Back (152750654).  
Comments:  
2023 13:11 Bozena Mann RN  
cyst removed  
Hip arthroplasty   
(149265700).  
Arthroscopic repair of   
rotator cuff.   
(6549497251).  
Heel (641358945).  
Comments:  
2023 13:11 Bozena Mann RN  
heel spur  
Social History  
Electronic   
Cigarette/Vaping   
Assessment  
Electronic Cigarette   
Use: Never.  
Alcohol Assessment  
Use: Past.  
Tobacco Assessment  
Former tobacco user   
Tobacco Use:.  
Substance Abuse   
Assessment  
Substance use: Never.  
.  
Social & Psychosocial   
Habits  
Alcohol  
2023 Alcohol   
Use: Past  
Substance Use  
2023 Substance   
use: Never  
Tobacco  
2023 Smoking   
tobacco use: Former   
tobacco user  
Electronic   
Cigarette/Vaping  
2023 Electronic   
Cigarette Use: Never  
.  
Physical Examination  
Vital Signs (last 24   
hrs)_____ Last   
Charted___________  
Heart Rate Monitored L   
58 bpm (SEP 18 10:40)  
Resp Rate 14 br/min   
(SEP 18 10:40)  
SBP H 149 mmHg (SEP 18   
10:40)  
DBP L 58 mmHg (SEP 18   
10:40)  
Airway:  
Mallampati   
classification: II   
(soft palate, fauces,   
uvula visible).  
Temporomandibular   
joint mobility: Good.  
Mouth: Adequate   
opening, Teeth (   
unremarkable ).  
Neck: Supple,   
Non-tender, Full range   
of motion, very thick,   
full neck.  
Respiratory: Lungs are   
clear to auscultation.  
Cardiovascular:   
Regular rhythm.  
Neurologic: Alert,   
Oriented.  
Review / Management  
Laboratory Results  
Plan  
American Society of   
Anesthesiologists   
(ASA) physical status   
classification: Class   
II.  
Anesthetic   
Preoperative Plan  
Anesthesia: General.  
, Regional   
(Interscalene Block,   
for post op pain   
control per surgeon   
request). Anesthetic   
plan, risks, benefits,   
and alternatives   
discussed with the   
patient and/or family.   
Patient verbalized   
understanding.   
Informed consent was   
given. Consent was   
signed by the patient.  
[Electronically Signed   
on: 2023 10:46   
EDT]  
______________________  
__________________  
Miguel Salgado MD   
[Verified on:   
2023 10:46 EDT]  
______________________  
__________________  
Miguel Salgado MD  [facility]  
   
                                                    MAGR Intraoperative Recordon  
 2023   
   
                                                    MAGR Intraoperative   
Record                                  MAGR Intra-Op Record   
Summary  
Primary Physician:   
Jose Abraham DO  
Case Number:   
[id number]  
Finalized Date/Time:   
23 13:29:31  
Pt. Name: SHERRIE LOZADAN  
/Sex: 1955   
FEMALE  
Med Rec #: [id number]  
Physician: Jose Abraham DO  
Financial #: [id number]  
Pt. Type: D  
[place] [facility] [place] [facility] [place]  
Admit/Disch: 23   
08:33:12 -  
Institution:  
Case Times MAGR  
Entry 1  
Patient  
In Room Time 23   
11:14:00 Out Room Time   
23 13:25:00  
Anesthesia  
Start Time 23   
11:13:00 Stop Time   
23 13:29:00  
Surgery  
Start Time 23   
11:52:00 Stop Time   
23 13:17:00  
Last Modified By:   
Lexi Rothman RN  
23 13:29:26  
Case Attendance MAGR  
Entry 1 Entry 2 Entry   
3  
Case Attendee   
Jose Abraham Diane RN Kerro, Robert M MD Andrew DO  
Role Performed Surgeon   
- Primary Circulator   
Anesthesiologist of  
Record  
Time In 23   
11:28:00 23   
11:14:00 23   
11:14:00  
Time Out 23   
13:05:00 23   
13:25:00 23   
13:25:00  
Procedure Arthroplasty   
Shoulder Arthroplasty   
Shoulder Arthroplasty   
Shoulder  
Total Reverse(Left)   
Last Modified By:   
Lexi Rothman RN, Diane RN Kokinda, Diane RN  
23 13:25:19   
Entry 4 Entry 5 Entry   
6  
Case Attendee Elayne Napier Regina CSFA CST Bloemer, Kelly CST CST  
Role Performed First   
Assistant First   
Assistant Scrub   
Personnel  
Time In 23   
11:14:00 23   
11:14:00 23   
11:14:00  
Time Out 23   
13:25:00 23   
13:25:00 23   
13:25:00  
Procedure Arthroplasty   
Shoulder Arthroplasty   
Shoulder Arthroplasty   
Shoulder  
Total Reverse(Left)   
Last Modified By:   
Lexi Rothman RN, Diane RN Kokinda, Diane RN  
23 13:25:19   
General Comments:  
DEANA CANTU AND MIKE CARVER -AR[facility]REX REPS  
Surgical Procedures   
MAGR  
Pre-Care Text:  
A.20 Verifies   
operative procedure,   
surgical site, and   
laterality Im.150   
Develops   
individualized plan of   
care  
Entry 1  
Procedure Arthroplasty   
Shoulder Primary   
Procedure Yes  
Total Reverse  
Primary Surgeon   
Jose Abraham   
Modifiers Left  
Errol DO  
Surgeon Comment LEFT   
REVERSE TOTAL Start   
23 11:52:00  
SHOULDER  
Stop 23 13:17:00   
Anesthesia Type   
General  
Surgical Service   
Orthopedics Wound   
Class Clean  
Technique Details  
Closure Technique   
Primary Entire   
procedure No  
was performed via  
laparoscope or  
robotic assistance  
Last Modified By:   
Lexi Rothman RN  
23 13:25:24  
Post-Care Text:  
O.730 The patient's   
care is consistent   
with the   
individualized   
perioperative plan of   
care  
General Case Data MAGR  
Pre-Care Text:  
A.350.1 Classifies   
surgical wound  
Entry 1  
Case Information  
OR MAGR OR 05 Case   
Level Level 5  
Wound Class Clean   
Specialty Orthopedics  
ASA Class 2  
Diagnosis  
Preop Diagnosis DJD   
Postop Same As Preop   
Yes  
Postop Diagnosis DJD  
Blunt or No Is the   
procedure No  
penetrating injury   
considered  
occured prior to   
Emergent/Urgent?  
the start of the  
procedure:  
Last Modified By:   
Lexi Rothman RN  
23 12:03:26  
Post-Care Text:  
O.760 Patient receives   
consistent and   
comparable care   
regardless of the   
setting  
Time Out MAGR  
Entry 1  
Time out date/time   
23 11:51:00 All   
team members Yes  
have introduced  
themselves by name  
and role  
Surgeon, Yes Surgeon   
reviews Yes  
anesthesia, nurse   
critical or  
confirm patient,   
unexpected steps,  
site, procedure   
operative duration,  
anticipated blood  
loss  
Anesthesia team Yes   
Nursing team Yes  
reviews any reviews   
sterility  
patient-specific   
(including  
concerns indicator   
results)  
and equipment  
issues/concerns  
Antibiotic  
Antibiotic Yes   
Administration Time   
11:13  
prophylaxis given  
within the last 60  
minutes  
Last Modified By:   
Lexi Rothman RN  
23 12:03:58  
Patient Positioning   
MAGR  
Pre-Care Text:  
A.280 Identifies   
baseline   
musculoskeletal status   
Im.40 Positions the   
patient Im.80 Applies   
safety devices  
Entry 1  
Procedure Arthroplasty   
Shoulder Body Position   
Beach Chair  
Total Reverse(Left)  
Left Arm Position   
Resting at Side Right   
Arm Position Resting   
at Side  
Left Leg Position   
Other/see comments   
Right Leg Position   
Other/see comments  
Feet Uncrossed? Yes   
Press Points Checked   
Yes  
Additional PILLOWS   
USED UNDER Positioning   
Device Pillow, Safety   
Strap  
Information LEGS,   
PILLOW UNDER  
CALVES TO KEEP HEELS  
OFF THE MATRESS,  
NON-OPERATIVE ARM IN  
ARM SUPPORT  
Outcome Met (O.80) Yes  
Last Modified By:   
Lexi Rothman RN  
23 12:04:10  
Post-Care Text:  
E.290 Evaluates   
musculoskeletal status   
O.80 Patient is free   
from signs and   
symptoms of injury   
related to  
positioning  
Skin Prep MAGR  
Pre-Care Text:  
A.30 Verifies   
allergies Im.270   
Performs skin   
preparation Im.270.1   
Implements protective   
measures to prevent  
skin and tissue injury   
due to chemical   
sources  
Entry 1  
Skin Prep Syntegrity  
Prep Agents (Im.270)   
(more content not   
included)...        [facility]  
   
                                                    MAGR Intraoperative   
Record                                  MAGR Intra-Op Record   
Summary  
Primary Physician:  
Case Number:   
[id number]  
Finalized Date/Time:   
23 11:13:51  
Pt. Name: LOZADABRENNENSHAUN SPRING  
/Sex: 1955   
FEMALE  
Med Rec #: [id number]  
Physician: Jose Abraham DO  
Financial #: [id number]  
Pt. Type: D  
[place] [facility] [place] [place]  
Admit/Disch: 23   
08:33:12 -  
Institution:  
Case Times MAGR  
Entry 1  
Patient  
In Room Time 23   
10:25:00 Out Room Time   
23 11:14:00  
Anesthesia  
Start Time 23   
10:26:00 Stop Time   
23 10:37:00  
Surgery  
Start Time 23   
10:30:00 Stop Time   
23 10:37:00  
Last Modified By:   
Karen Mathews RN  
23 11:13:42  
General Comments:  
nerve block to left   
shoulder  
Case Attendance MAGR  
Entry 1 Entry 2 Entry   
3  
Case Attendee Miguel Salgado MD, Lora RN Slauterbeck, Linda RN  
Role Performed   
Anesthesiologist of   
Circulator Circulator  
Record  
Time In 23   
10:25:00 23   
10:23:00 23   
10:24:00  
Time Out 23   
10:37:00 23   
10:40:00 23   
10:46:00  
Procedure Interscalene   
Block(Left)   
Interscalene   
Block(Left)   
Interscalene   
Block(Left)  
Last Modified By:   
Karen Mathews RN, Linda RN Slauterbeck, Linda RN  
23 10:44:44   
Surgical Procedures   
MAGR  
Pre-Care Text:  
A.20 Verifies   
operative procedure,   
surgical site, and   
laterality Im.150   
Develops   
individualized plan of   
care  
Entry 1  
Procedure Interscalene   
Block Primary   
Procedure Yes  
Primary Surgeon Miguel Salgado MD Modifiers   
Left  
Surgeon Comment   
SCALENE BLOCK LEFT   
Start 23   
10:30:00  
REVERSE TOTAL SHOULDER  
Stop 23 10:37:00   
Anesthesia Type   
Regional Block  
Surgical Service   
Anesthesia Wound Class   
Clean  
Technique Details  
Closure Technique N/A   
Entire procedure No  
was performed via  
laparoscope or  
robotic assistance  
Last Modified By:   
Karen Mathews RN  
23 10:45:22  
Post-Care Text:  
O.730 The patient's   
care is consistent   
with the   
individualized   
perioperative plan of   
care  
General Case Data MAGR  
Pre-Care Text:  
A.350.1 Classifies   
surgical wound  
Entry 1  
Case Information  
OR MAGR Proc Room Case   
Level None  
Wound Class Clean   
Specialty Anesthesia  
ASA Class 2  
Diagnosis  
Preop Diagnosis   
SCALENE BLOCK PRIOR TO   
Postop Same As Preop   
Yes  
LEFT REVERSE TOTAL  
SHOULDER  
Postop Diagnosis   
SCALENE BLOCK PRIOR TO  
LEFT REVERSE TOTAL  
SHOULDER  
Blunt or No Is the   
procedure No  
penetrating injury   
considered  
occured prior to   
Emergent/Urgent?  
the start of the  
procedure:  
Last Modified By:   
Karen Mathews RN  
23 10:46:10  
Post-Care Text:  
O.760 Patient receives   
consistent and   
comparable care   
regardless of the   
setting  
Time Out MAGR  
Entry 1  
Time out date/time   
23 10:26:00 All   
team members Yes  
have introduced  
themselves by name  
and role  
Surgeon, Yes Surgeon   
reviews Yes  
anesthesia, nurse   
critical or  
confirm patient,   
unexpected steps,  
site, procedure   
operative duration,  
anticipated blood  
loss  
Anesthesia team Yes   
Nursing team Yes  
reviews any reviews   
sterility  
patient-specific   
(including  
concerns indicator   
results)  
and equipment  
issues/concerns  
Antibiotic  
Antibiotic N/A  
prophylaxis given  
within the last 60  
minutes  
Is essential Yes  
imaging displayed?  
Last Modified By:   
Karen Mathews RN  
23 10:46:47  
Patient Positioning   
MAGR  
Pre-Care Text:  
A.280 Identifies   
baseline   
musculoskeletal status   
Im.40 Positions the   
patient Im.80 Applies   
safety devices  
Entry 1  
Procedure Interscalene   
Block(Left) Body   
Position Supine  
Left Arm Position   
Resting at Side Right   
Arm Position Resting   
at Side  
Left Leg Position   
Extended Right Leg   
Position Extended  
Feet Uncrossed? Yes   
Press Points Checked   
Yes  
Outcome Met (O.80) Yes  
Last Modified By:   
Karen Mathews RN  
23 10:47:14  
Post-Care Text:  
E.290 Evaluates   
musculoskeletal status   
O.80 Patient is free   
from signs and   
symptoms of injury   
related to  
positioning  
Skin Prep MAGR  
Pre-Care Text:  
A.30 Verifies   
allergies Im.270   
Performs skin   
preparation Im.270.1   
Implements protective   
measures to prevent  
skin and tissue injury   
due to chemical   
sources  
Entry 1  
Skin Prep Syntegrity  
Prep Agents (Im.270)   
Chlorhexidine   
Gluconate Prep By   
Miguel Salgado MD  
and Alcohol  
Prep Area (Im.270)   
Shoulder, Neck Prep   
Area Details Left  
Skin Prep Agent Dry   
Yes  
Without Pooling  
Hair Removal  
Syntegrity  
Hair Removal Methods   
No hair removal  
performed  
Outcome Met (O.100)   
Yes  
Last Modified By:   
Karen Mathews RN  
23 10:48:06  
Post-Care Text:  
E.10 Evaluates for   
signs and symptoms of   
physical injury to   
skin and tissue O.100   
Patient is free from   
signs  
and symptoms of   
chemical injury  
Departure from OR MAGR  
Entry 1  
Present on Depart   
Oxygen Via Stretcher  
Post-op Destination   
[place]  
Skin DFO  
Condition Intact  
Description  
Condition Warm  
Description  
Report Given To   
Lexi Rothman RN  
Airway Maintenance  
Patient Status Stable   
Oxygen in Use? Yes  
Airway D (more content   
not included)...    Normal                                  [facility]   
3   
   
                                        [facility] PACU Record    [facility]   
Summary  
Primary Physician:   
Jose Abraham DO  
Case Number:   
[id number]  
Finalized Date/Time:   
23 14:30:30  
Pt. Name: SHERRIE LOZADA D.O.B./Sex: 1955   
FEMALE  
Med Rec #: [id number]  
Physician: Jose Abraham DO  
Financial #: [id number]  
Pt. Type: D  
Room/Bed: 227/1  
Admit/Disch: 23   
08:33:12 -  
Institution:  
PACU Case Times MAGR  
Entry 1  
In PACU I 23   
13:25:00 Discharge   
from PACU 23   
14:14:00  
I  
Last Modified By:   
Reanna Silver RN  
23 14:30:27  
Finalized By: Reanna Silver RN  
Document Signatures  
Signed By:  
Reanna Silver RN   
23 14:30      [facility]  
   
                                                    MAGR Preoperative Recordon 0  
2023   
   
                                                    MAGR Preoperative   
Record                                  MAGR Pre-Op Record   
Summary  
Primary Physician:   
Jose Abraham DO  
Case Number:   
[id number]  
Finalized Date/Time:   
23 14:30:46  
Pt. Name: SHERRIE LOZADA CLEMENTINE  
/Sex: 1955   
FEMALE  
Med Rec #: [id number]  
Physician: Jose Abraham DO  
Financial #: [id number]  
Pt. Type: D  
Room/Bed: 227/1  
Admit/Disch: 23   
08:33:12 -  
Institution:  
Pre-Op Case Times MAGR  
Pre-Care Text:  
Patient will be   
optimally prepared for   
surgery. Patient is   
free from s/s of   
injury. Provide   
information to  
patient/family related   
to plan of care.   
Verify patient   
allergies. Confirm   
identity and verify   
consent before  
the operative or   
invasive procedure.  
Entry 1  
Patient Arrival Time   
23 08:50:00   
Preop Departure   
23 11:13:00  
Last Modified By:   
Reanna Silver RN  
23 14:30:43  
Post-Care Text:  
Patient is prepared   
mentally and   
physically and is   
ready for surgery. The   
patient remains free   
from s/s of  
injury. Patient/family   
express understanding   
of plan of care and   
participate in   
decisions affecting   
his or her  
perioperrative plan of   
care. Allergies   
documented   
appropriately. Patient   
identifiers and   
consent correct.  
General Comments:  
pt arrives to [facility]   
ambulatory. denies   
CP,cough,cold, COVID   
like symptoms. pt has   
diabetes, denies  
pacemaker/defib, sleep   
apnea. pt verbalizes   
understanding of post   
op anesthesia orders   
including no driving   
for  
24 hours.  
Finalized By: Reanna Silver RN  
Document Signatures  
Signed By:  
Reanna Silver RN   
23 14:30      Normal                                  [facility]  
   
                                                    Nutrition Noteon 2023   
   
                                        Nutrition Note      Pt admitted for   
scheduled Lt shoulder   
surgery. Currently   
NPO. Pre-op wt 93.4kg   
w/ no recent previous   
wts on file.    
pre-op labs reviewed,   
Na 135L. Pt w/ DM,   
this am BS at   
113mg/dl. Per med hx,   
Pt does take several   
OTC   
vitamins/minerals/supp  
lements. No   
chewing/swallowing   
problems identified.   
Once diet advanced,   
recommend 1800CD DM   
for carb consistency.   
Other than age at 67y   
w/ surgery, Pt appears   
at low nutrition risk.   
Will monitor wts,   
intake and labs.    Normal                                  [facility]  
   
                                                    Operative Report - Surgeon/P  
hugh 2023   
   
                                                    Operative Report -   
Surgeon/Physician                       Preoperative   
diagnosis: Primary   
osteoarthritis left   
shoulder  
Postoperative   
diagnosis: Same  
Procedure: Reverse   
total shoulder   
replacement left  
Surgeon: LOPEZ Abraham D.O.  
Anesthesia: General  
Indications for   
surgery: Radiographic   
findings consistent   
with arthritis   
progressive pain   
symptoms with loss of   
function and failure   
of conservative   
treatment  
Estimated blood loss:   
100  
Complications: No   
complications  
Findings: Bone-on-bone   
and flattening of the   
humeral head and   
inferior humeral head   
osteophyte with   
absence of articular   
cartilage in the   
glenoid and humeral   
head  
Procedure summary:   
After administration   
of general anesthesia   
the patient was placed   
in the beachchair   
position the left   
shoulder was prepped   
with isopropyl alcohol   
and then with   
Betadine. The patient   
was draped out in the   
usual fashion and a   
timeout was taken. An   
anterior incision with   
the anterior   
deltopectoral approach   
was utilized. The   
cephalic vein was   
identified and it was   
mobilized laterally   
with the deltoid. The   
pectoralis and   
conjoined tendon and   
biceps tendon were all   
identified. The   
pectoralis was   
released about a   
centimeter and then   
the biceps was   
transected at the   
level of the   
pectoralis. The   
capsule and what   
remained of the   
subscapularis were   
reflected medially and   
the shoulder was   
dislocated there was   
gross deformity of the   
humeral head with   
advanced arthritic   
changes. Utilizing a   
20 degree version   
guide a cut was taken.   
The humerus was opened   
with a hand-held   
reamer and then   
broaching sequentially   
up to size 7. The size   
7 broach was left in   
place and my attention   
was turned towards the   
glenoid. What remained   
of the labrum was   
excised the biceps   
stump was excised. A   
24 mm baseplate guide   
was used to inserted a   
Steinmann pin. Reaming   
was performed over   
this to prepare the   
glenoid surface and   
then reaming was   
performed for the   
central post to 25 mm.  
2 mm offset 24   
baseplate with a 25 mm   
post was impacted into   
place it was snug and   
secure. It was   
stabilized with 2   
nonlocking screws and   
2 locking screws. Next   
a 36+4 glenosphere was   
impacted into place.   
This was further   
stabilized with a   
locking screw   
centrally that was   
torqued between 4 and   
5.  
The proximal humerus   
was prepared with the   
reamer and then a   
neutral cup was   
clicked into place for   
trial reductions with   
a +3 mm poly. The   
shoulder was stable   
the deltoid was   
tensioned with good   
range of motion. The   
trial components were   
removed and the broach   
was removed and a size   
7 apex stem with a   
neutral cup was   
assembled on the back   
table and then   
implanted into the   
patient. Next 3 mm x   
36 liner was clicked   
into place and the   
shoulder was reduced.   
The shoulder was taken   
through range of   
motion it was stable.  
There was no tendency   
towards dislocation   
there was good range   
of motion and the   
deltoid was tensioned   
appropriately. The   
joint was soaked in   
diluted Betadine and   
then dried. The   
subcutaneous fascia   
was closed with a 2-0   
Vicryl then a running   
2-0 Vicryl stitch and   
then a running 3-0   
Vicryl subcuticular   
closure followed by   
tincture benzoin and   
Steri-Strips. Sterile   
dressings were   
applied. The patient   
tolerated the surgery   
without complications.  
[Electronically Signed   
on: 2023 13:39   
EDT]  
______________________  
__________________  
Jose Abraham DO [Verified   
on: 2023 13:39   
EDT]  
______________________  
__________________  
Jose Abraham DO           Normal                                  [facility]  
   
                                                    POCT Glucose Levelon   
023   
   
                      Glucose [Mass/Vol] 108 mg/dL  Normal          [facility]  
   
                                        Comment on above:   Performed By: #### 4  
[id number] ####[facility]   
(DEFAULT)[address]   
[place] [address]   
   
                      Glucose [Mass/Vol] 113 mg/dL  Normal          [facility]  
   
                                        Comment on above:   Performed By: #### 4  
[id number] ####[facility]   
(DEFAULT)[address]   
[place] [address]   
   
                                                    Patient Handouton 2023  
   
   
                                        Patient Handout     DR. MANDEL POST  
   
OPERATIVE SHOULDER   
INSTRUCTIONS  
SURGEONS WRITTEN   
INSTRUTCTIONS:  
1. If you have been   
given a cryo cuff   
after surgery you   
should use it as much   
as possible for the   
first 24-48 hours.   
After that it is   
optional. TIP: Many   
patients prefer to use   
it a little longer   
because it helps   
reduce pain  
2. You should wiggle   
your fingers   
frequently  
3. Change your   
dressings in 1 day. If   
steri-strips have been   
applied DO NOT remove   
them. When the wound   
is clean and dry you   
may leave it open to   
air but again DO NOT   
remove any   
steri-strips that have   
been applied  
4. You may shower in 1   
day but do not let the   
water stream directly   
strike the wound  
5. Do pendulum   
exercises for at least   
10 minutes twice a day  
6. If you have any   
problems or concerns,   
please call the office   
at [phone]  
7. Follow up as   
scheduled           [facility]  
   
                                                    Progress Note - Nurseon    
   
                                                    Progress Note -   
Nurse                                   Patient arrives via   
bed to room from   
recovery. Drowsy but   
easily aroused. Vital   
signs per iview.   
Oxygen sat 88% while   
patient dozing so   
oxygen applied at 2   
liters per NC until   
more awake. Daughter   
at bedside. Denies   
pain.  
[Electronically Signed   
on: 2023 15:56   
EDT]  
______________________  
__________________  
Mary Godinez RN   
[Verified on:   
2023 15:56 EDT]  
______________________  
__________________  
Mary Godinez RN     [facility]  
   
                                                    Progress Note - Nurseon    
   
                                                    Progress Note -   
Nurse                                   Pre op phone call,   
spoke with patient.   
Confirmed time of   
arrival for 0600 on   
23. Reviewed pre   
op instructions.   
Patient questioned if   
we got the ECHO   
results? States that   
her family doctor Dr. Veliz ([place]) had   
ordered it for   
clearance for the   
upcoming surgery.   
Informed I was not   
aware of the ECHO   
being done but would   
look into it. Informed   
that she would only   
hear back if something   
was abnormal with the   
ECHO. Verbalized   
understanding.  
[Electronically Signed   
on: 09/15/2023 09:30   
EDT]  
______________________  
__________________  
Tanisha Harrison RN   
[Verified on:   
09/15/2023 09:30 EDT]  
______________________  
__________________  
Tanisha aHrrison RN   
Call made to Dr. Abraham's office,   
spoke with Loren.   
Informed of the above.   
States she will have   
Pia call me back.  
[Electronically Signed   
on: 09/15/2023 09:34   
EDT]  
______________________  
__________________  
Tanisha Harrison RN from Dr. Abraham's office   
called. Stated they   
are waiting for the   
ECHO to be read and   
will hopefully have   
the clearance letter   
today or first thing   
Monday. Will call and   
inform patient that   
they will be coming in   
at 0830 instead of   
0600.  
[Electronically Signed   
on: 09/15/2023 10:08   
EDT]  
______________________  
__________________  
Tanisha Harrison RN [facility]  
   
                                                    C Urineon 2023   
   
                                        C Urine             Urine Culture ordere  
d   
as a result of   
parameters set on   
specific urine dip and   
urine microsopic   
results.  
30,000 cfu/ml   
Klebsiella pneumoniae  
ORGANISM  
Klepne  
----------------------  
--- SUSCEPTIBILITY   
----------------------  
--  
ORGANISM ID: 1  
ANTIBIOTIC   
INTERPRETATION KELSIE   
STATUS  
ORGANISM KlepneKlepne  
Amik S <=16 Verified  
Amox/Cla S <=8/4   
Verified  
Amp R >16 Verified  
Amp/Sul S <=8/4   
Verified  
Azt S 8 Verified  
Cefaz S <=2 Verified  
Cefep S <=8 Verified  
Cefo S <=2 Verified  
Ceftaz S <=1 Verified  
Ceftri S <=1 Verified  
Cefur S <=4 Verified  
Ceph S <=8 Verified  
Cipro S <=1 Verified  
Ertap S <=0.5 Verified  
Gent S <=2 Verified  
Imi S <=1 Verified  
Levo S <=2 Verified  
Nitro S <=32 Verified  
Pip/Davion S <=16   
Verified  
Tetra S <=4 Verified  
Tobra S <=4 Verified  
Tri/Sulf S <=2/38   
Verified            Normal                                  [facility]  
   
                                        Comment on above:   Performed By: #### 1  
[id number] ####  
[facility] (DEFAULT)  
[address] [place] [address]   
   
                                                    Coding Summaryon 2023   
   
                                        Coding Summary      [facility]   
XremfngjDRl3lIg+PGhlYW  
Q+OY9CCSJeY21xnCQcoK3f  
S7QRYXoOGvfeDOYBYEaURv  
MducYvUQ9biGVsIMLh  
IC8+VZ0fLQWdNzsctPUwj4  
O4jFP9Y65jix0gIXinpIC4  
CTPwFeIjgoqkf4zqkOz7KO  
cuNmluOyBt  
OCCptQ27JTG5fS38El04kF  
BfpJZyu3ykkOv2YpXxZRSm  
JKF7aGhiJQyyn1SnZWOuH3  
1dvPFia1B9  
RWEauUusdNBwMrJhiEV8oH  
8fJXxbxyouk3hlsopbNeo9  
nl20zXLlc2H9rGF6V5Vhqb  
G3KJModAVe  
XiwhmYUMsC4lisqxd9tlwo  
ykYcVhCBYtOHq2YZx8FUNt  
lIwiHoDxWL68OAQ4MQHgpe  
BqJ5CqTPWw  
cOtgTpH6g8D8Hl5RK0NDNm  
wnW4ZDIDCIIUsgaHL+PC90  
mq28Q2NfTkxgAtr9HHFiKH  
K1uNG0sM3u  
EMEuKIdfu4A1uYC2B1Pzga  
Neqt0qi6qtQYLeDWpdE76s  
zVVaz6B6RONyyEN1NKAnbO  
fwMxLukT49  
Oyc+WRNoyUqta0FbHlazh9  
jgv9cxjUu1ZqujBJDohyOw  
hBaoLMH9q0CsMi1dLZLqwG  
R9bDD6zG1r  
FaJfNlD5OJmqN317PmSbsK  
RuDjkrB34dR8KkgRF+PHRy  
Scy8XJZleTotVX5nE7AoZK  
RpbmctbGVm  
zIyuNB3jDSKevhcpHDZkaC  
9oZZHcA2q4EnYoUyK7IJsc  
V4NyEDHipttkBj72jB1iLl  
MtOvE2CNzf  
L3KwqhE9WLVhdYAwSRlcHH  
M8U57mb4M3TBStTJBiOKM7  
aMG2rT9ciZrbzeidcQOdlQ  
sgdmVydGlj  
REokPSfiR250CIWzpBcvWv  
NvZGluZyBEYXRlOiAgMDgv  
MjUvMjAyMzwvdGQ+PHRkIH  
N7aZugRXHj  
uEKaZMfmHi9hcMlteNgrXQ  
6yTNYsfkacNWIndN2xCTNv  
dFMfoMycBQ8tSEPlpmmnv6  
11NhLdPNA2  
PZUmvVUnF8JwfQ4rErEtMZ  
UuZSVnI8WecIYcGEwlB819  
UKpsKlX7RVWxelEhN9XqLI  
FsaWduOiB0  
a2T4Nb7Li0KhmcveZ8GyfT  
FnBxKuBwcoKQk9I0IsJnwb  
dHI+YH89UYCtHO31HPk0JZ  
V2kQgfLDqh  
WUMpW0BdeF1xUdCzYZHiDB  
RkOyc+PHRhYmxlIHdpZHRo  
HRlcLTWuTfCagIxtCI1fQl  
9yZGVyLWNv  
uKsmlDNgOmUpn0frLYDrFL  
jlTZ5rxAttA7TudWK9NMNg  
n3e8Nl19F64mA8ApdUW+PG  
FgePB2jXW4  
iE7vTjLbNwH8EJmaG113Hh  
DuuZTlEiksk9bxd7gfnOh7  
WpL7ILDvfiJbrFzgTUS9v4  
ZiUt40F76b  
IHdpZHRoPSIxNSUiIHZhbG  
tfon0fuD8eCp7+PGNvbCB3  
kAR8qK5mIbObMvC9KWgwK9  
49InRvcCIv  
Jkavv4lxr0ekxFu3IyRuUK  
HkkdPjgUsaILU8t3NyIf45  
J8WmaFscr4WaNqd5jj57mA  
Buf6J1hPF5  
O6EuKXAgpbaquYVhjIgnMD  
9mSHQigibtNKPudM4oDKXp  
P8w0KcTgUyW7YDsgV1Dqtz  
H9RMSokCFa  
MPNabPJJbU3nonalh2mifp  
dnRcFkVHAfLKw1ZWn2LVJk  
kEdgFjDoKLV6PwG2UCK5hP  
UmoH2cyFcp  
ainhiC7aExv+GCX4mBAlaP  
YSLU5kGwwlgRE+PHRkIHN0  
mJlhUEozVSWqkF8vYYJhQ9  
t3WoTzWrY6  
EDbiY1HcqgL4FRSibZOrTB  
OwtTRFbE6ouzqdh6rmphkf  
WaXmPEXwUEi7DTa6GNTmqV  
duOiBsZWZ0  
UzI5GUX4aUXljW2euEnela  
xygF1eIej+QmlydGggRGF0  
BOk1I2CfLey1OBNlrAmbGX  
0ncGFkZGlu  
Ft8wyVrbhBomER4ySRVlug  
wgs137UpRee6vvWJWjrTHe  
ELwvOQO3F66qc2D0WFRvFN  
HqEFQ8fVI0  
wW2noDlatqvfaEWfdCzfyo  
SvgLckLFpiMIhcD747KDYf  
jJziIiJiGSd8W5OdHvp6AG  
AimRbpMQ2r  
yZAaHVafXo1ucOupyBksEM  
2nDBCagaman192GcRyv3wl  
ZTSlnXQhWBdrUUS7X27jg8  
T7JGUeGBQe  
YGN9mKE4qP3giRtuzbpnqW  
VmdDsgdmVydGljYWwtYWxp  
A100PTXrkEcdPtZajSi2J4  
HmXtc7VJKx  
sZykOC1coFObKGwtMk8kaG  
wflRpbTZ9zKEJehpeld734  
KaZwm5nbOAYfxILoWAfrBA  
D7G08wk5X2  
ELOcVSAdAIF2qEH8lK1qgW  
lnbjogbGVmdDsgdmVydGlj  
PVfrSOxwU897LQVxsDhbTg  
BhdGllbnQg  
ARriTUs5M0ZfJtivxWE+PC  
23RGAnUN19nJFbxKIgh2op  
cZe4AsNhAKMqBKZ8kGnfLG  
yzk1DaDKSb  
V06bvEEkh3Z6KRCfyGpuhI  
ArBnFvrER5xJ6nYIglmghd  
x5iumrxyDorwm9fkma41oB  
67U66dGKbn  
ZHRoPSIzMCUiIHZhbGlnbj  
3deU5fTi4+OZVogVB8mHB7  
hE1vQFHyOuB4QMwrN405Xc  
RvcCIvPjxj  
m4mdp4hwyDi8WoX0PWRyfd  
ZflPokEGY5v5UcYv09U91t  
IHdpZHRoPSIyMCUiIHZhbG  
uben3pcJ2l  
Ii8+MRLngOZ5aCT2gN5fCp  
WoCcW4GUsxB303DeLtaCDu  
IlmqX91iJ8VqkRL+PHRyPj  
j3RBVoeKxs  
UJ4fbXYsXGnzAt8sKMI4Ts  
KpIbBiSLonD1ZwTUZozayh  
daqnjLQ3OJLtUTTbpO15Zm  
9udDogMTBw  
wMYAgX9epqsxt8pmplvgGr  
SdNXMuTIi5QVk6XAGifMqj  
HyXdZRN5QqJ7ZKP0sVCbwV  
1hbGlnbjog  
qD7uL7BnCBUigmeyRx20pD  
0qUwVoNpD5LTmxYvh+Sk9O  
TFGjCJNBYt4RWQukGNfHWm  
wvdGQ+PHRk  
MVQ2fEfmIJlkJSMqhS3cFP  
QcM1v8FoAnEpP2TPpqQ0Tx  
IYJchynfCp59mB5iYxFeGf  
O6KDvsM1Zc  
uaZ3KGZiiDFlYVyqFLF5N6  
8gy7G7XWPaOZUnOQX7bDK6  
hG2kbHyxvztwfBIztHcrzq  
VydGljYWwt  
ZTvbI308HWPvrQjmAnGgZc  
O0PkC9CIy4V4XaDrd7PHIp  
nSujJQ1ukUNdQRabQf8zmN  
yhqBqtKN4p  
OLZlvkmkGMLydG9iEUFzjA  
AsrYgkIJ0uLESbeneeb052  
AuGrIXW3TZHoiGAvO1IcuV  
9yOiAjMDAw  
AEYzA8RzlWLoMJvqR886YI  
nnNcD6LIAbnkOlJ2OkAPVa  
rMpuPcI2x1J5Nm01RQVKOP  
FyczwvdGQ+  
JOLnESL9oCzfWRunPUUbtW  
8iURPrZ7y2YrChFkI9NEfl  
G1NpGNRstfbeCn66dP2hXw  
OfDkA2DAkt  
Z2EfcwZ8CVWgfTHyEYtdDW  
N9N63hz0W2PBEuCWPsQZK9  
sDG8aR4tkQkwamtzfBHjfR  
sgdmVydGlj  
FBojELkcG096TJWfeLqlLk  
ZFTUFMRTwvdGQ+PHRkIHN0  
uKpqGRcpCIOgnG6pOYFqC0  
k3PfYoUaO0  
FGdqD7CiNZCsgxbnRf95lU  
1sJcSdLkB8NQnbA6WufbA3  
XWRihGQkVGpfWOX1N58by2  
P4VFSqJSEm  
ACR1yLU8mZ6spGmlojvpzW  
VmdDsgdmVydGljYWwtYWxp  
F527DHMphScbWv3ECU00LC  
11D9ZuAahk  
dGFibGU+PHRhYmxlIHdpZH  
QnLTqpIPWyGuXmhTqjSD5e  
Vo6jFPXoWVMcsQbdxPZlDq  
Kfx0faRQRm  
JYoiTZ7enCzrJ1TktMA3PC  
Fkt6j6Ls66J14mD8BseMJ+  
RHMrnLQ8jBA2dD4gFuQxFq  
R1MUtoD446  
VbEvwDUxAlcef8sri4qhvI  
j8JjIgVRInijVqbGziJLW2  
h4CaPy90I05oQKpkKGPkAW  
IyMCUiIHZh  
jZppdx2eqI3kYj2+PGNvbC  
L8dZR6rC5lGwQuVvW0FJcg  
B668DjKalGBcIyszJ54zN5  
JvdXA+PHRy  
Hsi4BURfrVjlAM8zoCRgGS  
vxEs8tFCN7HyKiFoZiBXpm  
Q1UdSPCkacwtcnajrSE6OO  
EzWHOonN12  
Fj1idKcdMw8mFUUjTUY3RO  
QvsMYyL7WkmS6xEbBmKDSg  
INVaS5QqeHFcBIwaS140CR  
xwGyN6GTLr  
aoMsP1RjQPOupRyhYtQ3k6  
F2Ok8JyNypsHNoCU0hZxAp  
DEp7R4WoTba6EVGleScjAD  
0ncGFkZGlu  
Xr2dxAniyIexWE1yCBOpyk  
roj343JwDhi6jmSAQfbQTa  
YYljUUE5I82bs4O9SIKnRB  
IeEHQ2rLS6  
xL6bfFlyivlxdIHhhYcysj  
SliUhqZUuvGHscT837OHRh  
bXhwMyYMGrr1R5AxTex0CK  
IhrUfeLL5a  
nLPbIZbdOb8gdErpwUpwRS  
0lQADblufeq973FpWse1tn  
AUZokNIqOMroYKU6J72fh3  
X1TZItWOTy  
ALI5bXZ7uY0xqOwioeqkwS  
VmdDsgdmVydGljYWwtYWxp  
D096URNfiWlnEv7VKhz4Q8  
QdRxt7KKCo  
lMyiYU6kgBNnSDhbGi1swP  
otsOraFZ3kIYQyfdmkg656  
QdQvm9hcBQLvrZNaFCqySP  
W2L00dp6M2  
CVWeJKHhTVW2iNC7aY2soW  
lnbjogbGVmdDsgdmVydGlj  
YXcxLPdoF278XKPelKyzRd  
BheWVyOjwv  
dGQ+MF25ng35M4KzAahxPc  
x6HIPlOFI3kQY7qW6nMQRe  
UIpqc5X8xKM6E2FpbqSvfd  
2ni9bwAWVu  
ZTo (more content not   
included)...        [facility]  
   
                                                    C MRSA Screenon 2023   
   
                      C MRSA Screen Negative   [facility]  
   
                                        Comment on above:   Performed By: #### 1  
[id number] ####[facility] (DEFAULT)[address]  
  
[place] [address]   
   
                                                    Provider Orderson 2023  
   
   
                                        Provider Orders     [phone]  
52  
[id number]  
TGTIFF              [facility]  
   
                                                    .Auto Diff 1on 2023   
   
                      Auto Mono % 10 %       Normal     1-12       [facility]  
   
                                        Comment on above:   Performed By: #### 1  
[id number], [id number] ####[facility] (DEFAULT)[address] [place] [address]   
   
                      Baso Abs#  0.1 x10    Normal     0.0-0.2    [facility]  
   
                                        Comment on above:   Performed By: #### 1  
[id number], [id number], [id number] ####[facility] (DEFAULT)[address] [place] [address]   
   
                                                    Basophils/100 WBC   
(Bld)           0.5 %           Normal          0.2-2.0         [facility]  
   
                                        Comment on above:   Performed By: #### 1  
[id number], [id number] ####[facility] (DEFAULT)[address] [place] [address]   
   
                      Eos Abs#   0.1 x10    Normal     0.0-0.4    [facility]  
   
                                        Comment on above:   Performed By: #### 1  
[id number], [id number], [id number] ####[facility] (DEFAULT)[address] [place] [address]   
   
                                                    Eosinophils/100 WBC   
(Bld)           1.1 %           Normal          0.9-4.0         [facility]  
   
                                        Comment on above:   Performed By: #### 1  
[id number], [id number], [id number] ####[facility] (DEFAULT)[address] [place] [address]   
   
                      Lymph Abs# 2.1 x10    Normal     1.3-2.9    [facility]  
   
                                        Comment on above:   Performed By: #### 1  
[id number], [id number], [id number] ####[facility] (DEFAULT)[address] [place] [address]   
   
                                                    Lymphocytes/100 WBC   
(Bld)           18 %            Normal          14-48           [facility]  
   
                                        Comment on above:   Performed By: #### 1  
[id number], [id number], [id number] ####[facility] (DEFAULT)[address] [place] [address]   
   
                      Mono Abs#  1.2 x10    High       0.0-0.8    [facility]  
   
                                        Comment on above:   Performed By: #### 1  
[id number], [id number], [id number] ####[facility] (DEFAULT)[address] [place] [address]   
   
                      Neut Abs#  8.6 x10    Normal     1.5-9.2    [facility]  
   
                                        Comment on above:   Performed By: #### 1  
[id number], [id number], [id number] ####[facility] (DEFAULT)[address] [place] [address]   
   
                                                    Neutrophils/100 WBC   
(Bld)           71 %            Normal          44-88           [facility]  
   
                                        Comment on above:   Performed By: #### 1  
[id number], [id number], [id number] ####[facility] (DEFAULT)[address] [place] [address]   
   
                                                    BMP Standardon 2023   
   
                                eGFR Non AA     49 mL/min/1.73m2 Invalid   
Interpretation   
Code                                                [facility]  
   
                                        Comment on above:   Performed By: #### 1  
[id number], [id number], [id number] ####[facility] (DEFAULT)[address] [place] [address]   
   
                                eGFR AA         59 mL/min/1.73m2 Invalid   
Interpretation   
Code                                                [facility]  
   
                                        Comment on above:   Performed By: #### 1  
[id number], [id number], [id number] ####[facility] (DEFAULT)[address] [place] [address]   
   
                                                    Anion gap   
[Moles/Vol]     12.9 mmol/L     Normal          5.0-19.0        [facility]  
   
                                        Comment on above:   Performed By: #### 1  
[id number], [id number], [id number] ####[facility] (DEFAULT)[address] [place] [address]   
   
                      Calcium [Mass/Vol] 9.2 mg/dL  Normal     8.9-10.3   [facility]  
   
                                        Comment on above:   Performed By: #### 1  
[id number], [id number], [id number] ####[facility] (DEFAULT)[address] [place] [address]   
   
                                                    Chloride   
[Moles/Vol]     98 mmol/L       Low             101-111         [facility]  
   
                                        Comment on above:   Performed By: #### 1  
[id number], [id number], [id number] ####[facility] (DEFAULT)[address] [place] [address]   
   
                      CO2 [Moles/Vol] 28 mmol/L  Normal     21-32      [facility]  
   
                                        Comment on above:   Performed By: #### 1  
[id number], [id number], [id number] ####[facility] (DEFAULT)[address] [place] [address]   
   
                                                    Creatinine   
[Mass/Vol]      1.12 mg/dL      Normal          0.60-1.30       [facility]  
   
                                        Comment on above:   Performed By: #### 1  
[id number], [id number], [id number] ####[facility] (DEFAULT)[address] [place] [address]   
   
                      Glucose [Mass/Vol] 110.0 mg/dL Normal     74.0-118.0 [facility]  
   
                                        Comment on above:   Performed By: #### 1  
[id number], [id number], [id number] ####[facility] (DEFAULT)[address] [place] [address]   
   
                                Osmolality      273 mOsm/L      Invalid   
Interpretation   
Code                                                [facility]  
   
                                        Comment on above:   Performed By: #### 1  
[id number], [id number], [id number] ####[facility] (DEFAULT)[address] [place] [address]   
   
                                                    Potassium   
[Moles/Vol]     3.9 mmol/L      Normal          3.6-5.1         [facility]  
   
                                        Comment on above:   Performed By: #### 1  
[id number], [id number], [id number] ####[facility] (DEFAULT)[address] [place] [address]   
   
                      Sodium [Moles/Vol] 135.0 mmol/L Low        136.0-144.0 [facility]  
   
                                        Comment on above:   Performed By: #### 1  
[id number], [id number], [id number] ####[facility] (DEFAULT)[address] [place] [address]   
   
                                                    Urea nitrogen   
[Mass/Vol]      20 mg/dL        Normal          8-26            [facility]  
   
                                        Comment on above:   Performed By: #### 1  
[id number], [id number], [id number] ####[facility] (DEFAULT)[address] [place] [address]   
   
                                                    Urea   
nitrogen/Creatinine   
[Mass ratio]    17.8 mg/mg      High            4.6-16.2        [facility]  
   
                                        Comment on above:   Performed By: #### 1  
[id number], [id number], [id number] ####[facility] (DEFAULT)[address] [place] [address]   
   
                                                    CBC w/ Auto Diffon   
3   
   
                                                    Erythrocyte   
distribution width   
(RBC) [Ratio]   12.7 %          Normal          11.5-15.0       [facility]  
   
                                        Comment on above:   Performed By: #### 1  
[id number], [id number], [id number] ####[facility] (DEFAULT)[address] [place] [address]   
   
                                                    Hematocrit (Bld)   
[Volume fraction] 38.4 %          Normal          33.7-40.4       [facility]  
   
                                        Comment on above:   Performed By: #### 1  
[id number], [id number], [id number] ####[facility] (DEFAULT)[address] [place] [address]   
   
                                                    Hemoglobin (Bld)   
[Mass/Vol]      12.9 g/dL       Normal          11.3-15.9       [facility]  
   
                                        Comment on above:   Performed By: #### 1  
[id number], [id number], [id number] ####[facility] (DEFAULT)[address] [place] [address]   
   
                                Man Diff?       Auto            Invalid   
Interpretation   
Code                                                [facility]  
   
                                        Comment on above:   Performed By: #### 1  
[id number], [id number], [id number] ####[facility] (DEFAULT)[address] [place] [address]   
   
                                                    MCH (RBC) [Entitic   
mass]           31 pg           Normal          24-34           [facility]  
   
                                        Comment on above:   Performed By: #### 1  
[id number], [id number], [id number] ####[facility] (DEFAULT)[address] [place] [address]   
   
                                                    MCHC (RBC)   
[Mass/Vol]      34 g/dL         Normal          26-37           [facility]  
   
                                        Comment on above:   Performed By: #### 1  
[id number], [id number], [id number] ####[facility] (DEFAULT)[address] [place] [address]   
   
                                                    MCV (RBC) [Entitic   
vol]            92 fL           Normal                    [facility]  
   
                                        Comment on above:   Performed By: #### 1  
[id number], [id number], [id number] ####[facility] (DEFAULT)[address] [place] [address]   
   
                      Platelet   403 x10    Normal     138-427    [facility]  
   
                                        Comment on above:   Performed By: #### 1  
[id number], [id number], [id number] ####[facility] (DEFAULT)[address] [place] [address]   
   
                                                    Platelet mean   
volume (Bld)   
[Entitic vol]   8.4 fL          Normal          6.3-10.2        [facility]  
   
                                        Comment on above:   Performed By: #### 1  
[id number], [id number], [id number] ####[facility] (DEFAULT)[address] [place] [address]   
   
                      RBC        4.18 x10   Normal     3.70-5.30  [facility]  
   
                                        Comment on above:   Performed By: #### 1  
[id number], [id number], [id number] ####[facility] (DEFAULT)[address] [place] [address]   
   
                      WBC        12.1 x10   High       3.5-10.5   [facility]  
   
                                        Comment on above:   Performed By: #### 1  
[id number], [id number], [id number] ####[facility] (DEFAULT)[address] [place] [address]   
   
                                                    UA Shpta4qb 2023   
   
                      UA Bacteria 1+         Normal                [facility]  
   
                                        Comment on above:   Order Comment: Urina  
lysis Microscopic order added on by   
[employer]   
[employer]pert Rules system.   
   
                                                            Performed By: #### 1  
[id number], [id number], [id number] ####  
[facility] (DEFAULT)  
[address] [place] [address]   
   
                      UA Gran Cast 0-5        Normal                [facility]  
   
                                        Comment on above:   Order Comment: Urina  
lysis Microscopic order added on by   
Discern   
Expert Rules system.   
   
                                                            Performed By: #### 1  
[id number], [id number], [id number] ####  
[facility] (DEFAULT)  
[address] [place] [address]   
   
                      UA Hyal Cast 0-5        [facility]  
   
                                        Comment on above:   Order Comment: Urina  
lysis Microscopic order added on by   
Discern   
Expert Rules system.   
   
                                                            Performed By: #### 1  
[id number], [id number], [id number] ####  
[facility] (DEFAULT)  
[address] [place] [address]   
   
                      UA Mucous  1+         [facility]  
   
                                        Comment on above:   Order Comment: Urina  
lysis Microscopic order added on by   
Discern   
Expert Rules system.   
   
                                                            Performed By: #### 1  
[id number], [id number], [id number] ####  
[facility] (DEFAULT)  
[address] [place] [address]   
   
                      UA RBC     5-10       [facility]  
   
                                        Comment on above:   Order Comment: Urina  
lysis Microscopic order added on by   
Discern   
Expert Rules system.   
   
                                                            Performed By: #### 1  
[id number], [id number], [id number] ####  
[facility] (DEFAULT)  
[address] [place] [address]   
   
                      UA Squam Epi Few        [facility]  
   
                                        Comment on above:   Order Comment: Urina  
lysis Microscopic order added on by   
Discern   
Expert Rules system.   
   
                                                            Performed By: #### 1  
[id number], [id number], [id number] ####  
[facility] (DEFAULT)  
[address] [place] [address]   
   
                      UA WBC     60-70      [facility]  
   
                                        Comment on above:   Order Comment: Urina  
lysis Microscopic order added on by   
Discern   
Expert Rules system.   
   
                                                            Performed By: #### 1  
[id number], [id number], [id number] ####  
[facility] (DEFAULT)  
[address] [place] [address]   
   
                                                    UA w Culture if Ind Standard  
on 2023   
   
                      Breakpoint UA ********   [facility]  
   
                                        Comment on above:   Performed By: #### 1  
[id number], [id number], [id number] ####  
[facility] (DEFAULT)  
[address] [place] [address]   
   
                      Color (U)  Yellow     [facility]  
   
                                        Comment on above:   Performed By: #### 1  
[id number], [id number], [id number] ####  
[facility] (DEFAULT)  
[address] [place] [address]   
   
                                Culture?        Indicated       Invalid   
Interpretation   
Code                                                [facility]  
   
                                        Comment on above:   Result Comment: Resu  
lt created by rule GL_MAGR_ADD_UA_CULT   
Result   
created by rule GL_MAGR_ADD_UA_CULT Result created by rule   
GL_MAGR_ADD_UA_CULT1 Result created by rule GL_MAGR_ADD_UA_CULT   
   
                                                            Performed By: #### 1  
[id number], [id number], [id number] ####  
[facility] (DEFAULT)  
[address] [place] [address]   
   
                                                    Glucose (U)   
[Mass/Vol]      Negative        Normal                          [facility]  
   
                                        Comment on above:   Performed By: #### 1  
[id number], [id number], [id number] ####  
[facility] (DEFAULT)  
[address] [place] [address]   
   
                      Ketones Ql (U) Negative   Normal                [facility]  
   
                                        Comment on above:   Performed By: #### 1  
[id number], [id number], [id number] ####  
[facility] (DEFAULT)  
[address] [place] [address]   
   
                                Micro?          Indicated       Invalid   
Interpretation   
Code                                                [facility]  
   
                                        Comment on above:   Result Comment: Resu  
lt created by rule GL_MAGR_ADD_UA_MICRO   
   
                                                            Performed By: #### 1  
[id number], [id number], [id number] ####  
[facility] (DEFAULT)  
[address] [place] [address]   
   
                      UA Bilirubin Negative   Normal                [facility]  
   
                                        Comment on above:   Performed By: #### 1  
[id number], [id number], [id number] ####  
[facility] (DEFAULT)  
[address] [place] [address]   
   
                      UA Blood   TRACE      Abnormal   NEGATIVE   [facility]  
   
                                        Comment on above:   Performed By: #### 1  
[id number], [id number], [id number] ####  
[facility] (DEFAULT)  
[address] [place] [address]   
   
                      UA Clarity CLOUDY     Abnormal   CLEAR      [facility]  
   
                                        Comment on above:   Performed By: #### 1  
[id number], [id number], [id number] ####  
[facility] (DEFAULT)  
[address] [place] [address]   
   
                      UA Leuk Est LARGE      Abnormal   NEGATIVE   [facility]  
   
                                        Comment on above:   Performed By: #### 1  
[id number], [id number], [id number] ####  
[facility] (DEFAULT)  
[address] [place] [address]   
   
                      UA Nitrite Negative   Normal     NEGATIVE   [facility]  
   
                                        Comment on above:   Performed By: #### 1  
[id number], [id number], [id number] ####  
[facility] (DEFAULT)  
[address] [place] [address]   
   
                      UA pH      6.0        Normal     5-8        [facility]  
   
                                        Comment on above:   Performed By: #### 1  
[id number], [id number], [id number] ####  
[facility] (DEFAULT)  
[address] [place] [address]   
   
                      UA Protein Negative   Normal     NEGATIVE   [facility]  
   
                                        Comment on above:   Performed By: #### 1  
[id number], [id number], [id number] ####  
[facility] (DEFAULT)  
[address] [place] [address]   
   
                      UA Spec Grav 1.025      Normal     1.001-1.035 [facility]  
   
                                        Comment on above:   Performed By: #### 1  
[id number], [id number], [id number] ####  
[facility] (DEFAULT)  
[address] [place] [address]   
   
                      UA Urobilinogen 1.0 mg/dL  Normal     0.2-1.0    [facility]  
   
                                        Comment on above:   Performed By: #### 1  
[id number], [id number], [id number] ####  
[facility] (DEFAULT)  
[address] [place] [address]   
   
                      Urine Source Clean Catch Normal                [facility]  
   
                                        Comment on above:   Performed By: #### 1  
[id number], [id number], [id number] ####  
[facility] (DEFAULT)  
[address] [place] [address]   
   
                                                    CBC AUTO DIFFon 2023   
   
                      BASO #     0.0 103/ul Normal     0.0-0.1    [facility]  
   
                                        Comment on above:   Performed By: #### C  
BC ####  
[facility]  
[address] [address]  
Dr. Nikole Jacobs   
   
                                                    Basophils/100 WBC   
(Bld)           0.3 %           Normal          0.2-2.0         [facility]  
   
                                        Comment on above:   Performed By: #### C  
BC ####  
[facility]  
[address] [place] [address]  
Dr. Nikole Jacobs   
   
                      EO #       0.1 103/ul Normal     0.0-0.7    [facility]  
   
                                        Comment on above:   Performed By: #### C  
BC ####  
[facility]  
[address] [place] [address]  
Dr. Nikole Jacobs   
   
                                                    Eosinophils/100 WBC   
(Bld)           0.5 %           Critically low  0.9-7.0         [facility]  
   
                                        Comment on above:   Performed By: #### C  
BC ####  
[facility]  
[address] [place] [address]  
Dr. Nikole Jacobs   
   
                                                    Erythrocyte   
distribution width   
(RBC) [Ratio]   12.7 %          Normal          11.0-15.0       [facility]  
   
                                        Comment on above:   Performed By: #### C  
BC ####  
[facility]  
[address] [place] [address]  
Dr. Nikole Jacobs   
   
                                                    Hematocrit (Bld)   
[Volume fraction] 36.0 %          Normal          36.0-48.0       [facility]  
   
                                        Comment on above:   Performed By: #### C  
BC ####  
[facility]  
[address] [place] [address]  
Dr. Nikole Jacobs   
   
                                                    Hemoglobin (Bld)   
[Mass/Vol]      12.0 g/dL       Normal          12.0-16.0       [facility]  
   
                                        Comment on above:   Performed By: #### C  
BC ####  
[facility]  
[address] [place] [address]  
Dr. Nikole Jacobs   
   
                      IG #       0.05 10e3/ul Critically high 0.00-0.03  [facility]  
   
                                        Comment on above:   Performed By: #### C  
BC ####  
[facility]  
[address] [place] [address]  
Dr. Nikole Jacobs   
   
                      IG %       0.4 %      Normal     0.0-0.5    [facility]  
   
                                        Comment on above:   Performed By: #### C  
BC ####  
[facility]  
[address] [place] [address]  
Dr. Nikole Jacobs   
   
                      LYMPH #    1.5 103/ul Normal     1.2-3.8    [facility]  
   
                                        Comment on above:   Performed By: #### C  
BC ####  
[facility]  
[address] [place] [address]  
Dr. Nikole Jacobs   
   
                                                    Lymphocytes/100 WBC   
(Bld)           11.8 %          Critically low  20.5-60.0       [facility]  
   
                                        Comment on above:   Performed By: #### C  
BC ####  
[facility]  
[address] [place] [address]  
Dr. Nikole Jacobs   
   
                      MANUAL DIFF REQ NO         Normal                [facility]  
   
                                        Comment on above:   Performed By: #### C  
BC ####  
[facility]  
[address] [place] [address]  
Dr. Nikole Jacobs   
   
                                                    MCH (RBC) [Entitic   
mass]           31.7 pg         Normal          26.7-34.0       [facility]  
   
                                        Comment on above:   Performed By: #### C  
BC ####  
[facility]  
[address] [place] [address]  
Dr. Nikole Jacobs   
   
                                                    MCHC (RBC)   
[Mass/Vol]      33.3 g/dL       Normal          29.9-35.2       [facility]  
   
                                        Comment on above:   Performed By: #### C  
BC ####  
[facility]  
[address] [place] [address]  
Dr. Nikole Jacobs   
   
                                                    MCV (RBC) [Entitic   
vol]            95.2 fL         Normal          81.0-99.0       [facility]  
   
                                        Comment on above:   Performed By: #### C  
BC ####  
[facility]  
[address] [place] [address]  
Dr. Nikole Jacobs   
   
                      MONO #     1.1 103/ul Critically high 0.3-0.8    [facility]  
   
                                        Comment on above:   Performed By: #### C  
BC ####  
[facility]  
[address] [place] [address]  
Dr. Nikole Jacobs   
   
                                                    Monocytes/100 WBC   
(Bld)           8.2 %           Normal          1.7-12.0        [facility]  
   
                                        Comment on above:   Performed By: #### C  
BC ####  
[facility]  
[address] [place] [address]  
Dr. Nikole Jacobs   
   
                      NEUT #     10.2 103/ul Critically high 1.4-6.5    [facility]  
   
                                        Comment on above:   Performed By: #### C  
BC ####  
[facility]  
[address] [id number]  
Dr. Nikole Jacobs   
   
                                                    Neutrophils/100 WBC   
(Bld)           78.8 %          Critically high 43.0-75.0       [facility]  
   
                                        Comment on above:   Performed By: #### C  
BC ####  
[facility]  
[address] [place] [address]  
Dr. Nikole Jacobs   
   
                                                    Platelet mean   
volume (Bld)   
[Entitic vol]   9.3 fL          Critically low  9.5-13.5        [facility]  
   
                                        Comment on above:   Performed By: #### C  
BC ####  
[facility]  
[address] [place] [address]  
Dr. Nikole Jacobs   
   
                      PLT        435 103/ul Normal     150-450    [facility]  
   
                                        Comment on above:   Performed By: #### C  
BC ####  
[facility]  
[address] [place] [address]  
Dr. Nikoel Jacobs   
   
                      RBC        3.78 106/ul Critically low 4.20-5.40  [facility]  
   
                                        Comment on above:   Performed By: #### C  
BC ####  
[facility]  
[address] [place], [place] [address]  
Dr. Nikole Jacobs   
   
                      WBC        13.0 103/ul Critically high 4.0-11.0   [facility]  
   
                                        Comment on above:   Performed By: #### C  
BC ####  
[facility]  
[address] [place] [address]  
Dr. Nikole Jacobs   
   
                                                    CT HEAD WO CONon 2023   
   
                                        CT HEAD WO CON      EXAMINATION: CT HEAD  
   
WO CON  
HISTORY: Syncope  
COMPARISON: No   
relevant comparison   
available.  
TECHNIQUE: Axial CT   
images were obtained   
without IV contrast.   
Dose reduction  
techniques were   
achieved by using   
automated exposure   
control and/or   
adjustment  
of mA and/or kV   
according to patient   
size and/or use of   
iterative   
reconstruction  
technique.  
FINDINGS:  
BRAIN: No edema,   
hemorrhage, mass,   
acute infarction, or   
inappropriate atrophy.  
CSF SPACES: No   
hydrocephalus,   
subarachnoid   
hemorrhage, or mass.   
Appropriate for  
age.  
SKULL: No fracture,   
mass, or other   
significant visible   
lesion.  
SINUSES: No   
significant mucosal   
thickening or fluid on   
the limited views.  
ORBITS: No appreciable   
abnormality on the   
limited views.  
OTHER: Empty pituitary   
fossa.  
IMPRESSION:  
1. No intracranial   
hemorrhage. Normal CT   
appearance of brain.  
2. Empty pituitary   
fossa; pituitary   
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  
3. No fracture of the   
calvarium or scalp   
hematoma.  
Electronically   
authenticated by:   
ZAC SHERMAN Date:   
2023 15:30    Normal                                  [facility]  
   
                                                    CULTURE URINEon 2023   
   
                                        CULTURE URINE       Culture Observations  
:  
LIGHT GROWTH OF MIXED   
GENITAL ARMAND. NO   
POTENTIAL PATHOGENS   
SEEN.               Normal                                  [facility]  
   
                                        Comment on above:   Performed By: #### C  
MP ####  
[facility]  
[address] [place] [address]  
Dr. Niokle Jacobs   
   
                                                    ER URINE PROFILEon   
3   
   
                      Bilirubin Ql (U) Negative   Normal     NEGATIVE   [facility]  
   
                                        Comment on above:   Performed By: #### U  
MICRO, ERUR ####  
[facility]  
[address] [place] [address]  
Dr. Nikole Jacobs   
   
                      Clarity (U) CLEAR      Normal     CLEAR      [facility]  
   
                                        Comment on above:   Performed By: #### U  
MICRO, ERUR ####  
[facility]  
[address] [place] [address]  
Dr. Nikole Jacobs   
   
                      Color (U)  YELLOW     Normal     YELLOW     [facility]  
   
                                        Comment on above:   Performed By: #### U  
MICRO, ERUR ####  
[facility]  
[address] [place] [address]  
Dr. Nikole Jacobs   
   
                                        ERUAHD              A micrscopic   
examination will be   
performed if   
indicated.          Normal                                  [facility]  
   
                                        Comment on above:   Performed By: #### U  
MICRO, ERUR ####  
[facility]  
[address] [place] [address]  
Dr. Nikole Jacobs   
   
                      Glucose Ql (U) 250 mg/dl  Abnormal   NEGATIVE   [facility]  
   
                                        Comment on above:   Performed By: #### U  
MICRO, ERUR ####  
[facility]  
[address] [place] [address]  
Dr. Nikole Jacobs   
   
                      Hemoglobin Ql (U) Negative   Normal     NEGATIVE   [facility]  
   
                                        Comment on above:   Performed By: #### U  
MICRO, ERUR ####  
[facility]  
[address] [address]  
Dr. Nikole Jacobs   
   
                      Ketones Ql (U) Negative   Normal     NEGATIVE   [facility]  
   
                                        Comment on above:   Performed By: #### U  
MICRO, ERUR ####  
[facility]  
[address] [place] [address]  
Dr. Nikole Jacobs   
   
                      LEUKOCYTES MODERATE   Abnormal   NEGATIVE   [facility]  
   
                                        Comment on above:   Performed By: #### U  
MICRO, ERUR ####  
[facility]  
[address] [address]  
Dr. Nikole Jacobs   
   
                      Nitrite Ql (U) Negative   Normal     NEGATIVE   [facility]  
   
                                        Comment on above:   Performed By: #### U  
MICRO, ERUR ####  
[facility]  
[address] [place] [address]  
Dr. Nikole Jacobs   
   
                      pH (U)     5.5 [pH]   Normal     5-9        [facility]  
   
                                        Comment on above:   Performed By: #### U  
MICRO, ERUR ####  
[facility]  
[address] [place] [address]  
Dr. Nikole Jacobs   
   
                      SPEC GRAVITY 1.025      Normal     1.005-<=1.025 [facility]  
   
                                        Comment on above:   Performed By: #### U  
MICRO, ERUR ####  
[facility]  
[address] [place] [address]  
Dr. Nikole Jacobs   
   
                          UA PROTEIN   Negative     Normal       NEGATIVE/   
TRACE                                   [facility]  
   
                                        Comment on above:   Performed By: #### U  
MICRO, ERUR ####  
[facility]  
[address] [place] [address]  
Dr. Nikole Jacobs   
   
                      UR MICRO IND INDICATED  Normal                [facility]  
   
                                        Comment on above:   Performed By: #### U  
MICRO, ERUR ####  
[facility]  
[address] [place] [address]  
Dr. Nikole Jacobs   
   
                      Urobilinogen Qn (U) 0.2 {Enma'U}/dL Normal     0.2 - 1.  
0  [facility]  
   
                                        Comment on above:   Performed By: #### U  
MICRO, ERUR ####  
[facility]  
[address] [place] [address]  
Dr. Nikole Jacobs   
   
                                                    POINT OF CARE GLUCOSEon -   
   
                      Glucose [Mass/Vol] 142 mg/dL  Critically high      T  
[facility]  
   
                                        Comment on above:   Performed By: #### C  
BC ####  
[facility]  
[address] [place] [address]  
Dr. Nikole Jacobs   
   
                                                    PROF 14(COMP METB)on   
023   
   
                      Albumin [Mass/Vol] 3.6 g/dL   Normal     3.4-5.0    [facility]  
   
                                        Comment on above:   Performed By: #### C  
BROOK, HSTROPN ####  
[facility]  
[address] [place] [address]  
Dr. Nikole Jacobs   
   
                                                    Albumin/Globulin   
[Mass ratio]    1.2 {ratio}     Normal                          [facility]  
   
                                        Comment on above:   Performed By: #### C  
BROOK, HSTROPN ####  
[facility]  
[address] [place] [address]  
Dr. Nikole Jacobs   
   
                                                    ALP [Catalytic   
activity/Vol]   77 U/L          Normal                    [facility]  
   
                                        Comment on above:   Performed By: #### C  
BROOK, HSTROPN ####  
[facility]  
[address] [place] [address]  
Dr. Nikole Jacobs   
   
                                                    ALT [Catalytic   
activity/Vol]   39 U/L          Normal          14-59           [facility]  
   
                                        Comment on above:   Performed By: #### C  
BROOK, HSTROPN ####  
[facility]  
[address] [place] [address]  
Dr. Nikole Jacobs   
   
                                                    Anion gap   
[Moles/Vol]     13.2 mmol/L     Normal                          [facility]  
   
                                        Comment on above:   Performed By: #### C  
BROOK, HSTROPN ####  
[facility]  
[address] [place] [address]  
Dr. Nikole Jacobs   
   
                                                    AST [Catalytic   
activity/Vol]   20 U/L          Normal          15-37           [facility]  
   
                                        Comment on above:   Performed By: #### C  
MP, HSTROPN ####  
[facility]  
[address] [place] [address]  
Dr. Nikole Jacobs   
   
                                                    Bilirubin   
[Mass/Vol]      0.4 mg/dL       Normal          0.2-1.0         [facility]  
   
                                        Comment on above:   Performed By: #### C  
BROOK, HSTROPN ####  
[facility]  
[address] [address]  
Dr. Nikole Jacobs   
   
                      Calcium [Mass/Vol] 9.2 mg/dL  Normal     8.5-10.1   [facility]  
   
                                        Comment on above:   Performed By: #### C  
MP, HSTROPN ####  
[facility]  
[address] [address]  
Dr. Nikole Jacobs   
   
                                                    Chloride   
[Moles/Vol]     98 mmol/L       Normal                    [facility]  
   
                                        Comment on above:   Performed By: #### C  
MP, HSTROPN ####  
[facility]  
[address] [address]  
Dr. Nikole Jacobs   
   
                      CO2 [Moles/Vol] 30.3 mmol/L Normal     21.0-32.0  [facility]  
   
                                        Comment on above:   Performed By: #### C  
MP, HSTROPN ####  
[facility]  
[address] [place] [address]  
Dr. Nikole Jacobs   
   
                                                    Creatinine   
[Mass/Vol]      1.58 mg/dL      Critically high 0.55-1.02       [facility]  
   
                                        Comment on above:   Performed By: #### C  
MP, HSTROPN ####  
[facility]  
[address] [address]  
Dr. Nikole Jacobs   
   
                      EGFR-AF AMERICAN 40 mL/min/1.73m2 Critically low >=60       
  [facility]  
   
                                        Comment on above:   Performed By: #### C  
MP, HSTROPN ####  
[facility]  
[address] [address]  
Dr. Nikole Jacobs   
   
                                                    EGFR-NON AF   
AMERICAN        33 mL/min/1.73m2 Critically low  >=60            [facility]  
   
                                        Comment on above:   Performed By: #### C  
MP, HSTROPN ####  
[facility]  
[address] [address]  
Dr. Nikole Jacobs   
   
                                                    Globulin (S)   
[Mass/Vol]      2.9 g/dL        Normal                          [facility]  
   
                                        Comment on above:   Performed By: #### C  
MP, HSTROPN ####  
[facility]  
[address] [address]  
Dr. Nikole Jacobs   
   
                      Glucose [Mass/Vol] 156 mg/dL  Critically high      [facility]  
   
                                        Comment on above:   Performed By: #### C  
MP, HSTROPN ####  
[facility]  
[address] [address]  
Dr. Nikole Jacobs   
   
                                                    Potassium   
[Moles/Vol]     3.5 mmol/L      Normal          3.5-5.1         [facility]  
   
                                        Comment on above:   Performed By: #### C  
MP, HSTROPN ####  
[facility]  
[address] [place] [address]  
Dr. Nikole Jacobs   
   
                      Protein [Mass/Vol] 6.5 g/dL   Normal     6.4-8.2    [facility]  
   
                                        Comment on above:   Performed By: #### C  
MP, HSTROPN ####  
[facility]  
[address] [place] [address]  
Dr. Nikole Jacobs   
   
                      Sodium [Moles/Vol] 138 mmol/L Normal     136-145    [facility]  
   
                                        Comment on above:   Performed By: #### C  
MP, HSTROPN ####  
[facility]  
[address] [place] [address]  
Dr. Nikole Jacobs   
   
                                                    Urea nitrogen   
[Mass/Vol]      26.0 mg/dL      Critically high 7.0-18.0        [facility]  
   
                                        Comment on above:   Performed By: #### C  
MP, HSTROPN ####  
[facility]  
[address] [place] [address]  
Dr. Nikole Jacobs   
   
                                                    Urea   
nitrogen/Creatinine   
[Mass ratio]    16.5 mg/mg      Normal                          [facility]  
   
                                        Comment on above:   Performed By: #### C  
MP, HSTROPN ####  
[facility]  
[address] [place] [address]  
Dr. Nikole Jacobs   
   
                                                    TROPONIN, HIGH SENSITIVITYon  
 2023   
   
                      HSTROP     35.4 pg/mL Normal     4.0-51.3   [facility]  
   
                                        Comment on above:   Result Comment: CUT-  
OFF POINTS HAVE BEEN ESTABLISHED BASED ON   
THE   
FOURTH UNIVERSAL DEFINITIONS OF MYOCARDIAL  
INFARCTION. THE UPPER REFERENCE LIMIT (URL) OF TROPONIN, DEFINED   
AS THE 99TH PERCENTILE OF  
cTnI DISTRIBUTION IN A REFERENCE POPULATION, HAS BEEN CONFIRMED AS   
THE DECISION THRESHOLD  
FOR MI DIAGNOSIS.   
   
                                                            Performed By: #### H  
STROPN ####  
[facility]  
[address] [place] [address]  
Dr. Nikole Jacobs   
   
                      HSTROP     36.5 pg/mL Normal     4.0-51.3   [facility]  
   
                                        Comment on above:   Result Comment: CUT-  
OFF POINTS HAVE BEEN ESTABLISHED BASED ON   
THE   
FOURTH UNIVERSAL DEFINITIONS OF MYOCARDIAL  
INFARCTION. THE UPPER REFERENCE LIMIT (URL) OF TROPONIN, DEFINED   
AS THE 99TH PERCENTILE OF  
cTnI DISTRIBUTION IN A REFERENCE POPULATION, HAS BEEN CONFIRMED AS   
THE DECISION THRESHOLD  
FOR MI DIAGNOSIS.   
   
                                                            Performed By: #### C  
MP, HSTROPN ####  
[facility]  
[address] [address]  
Dr. Nikole Jacobs   
   
                                                    URINE MICROSCOPIC ONLYon    
   
                      BACTERIA   TRACE      Abnormal   NONE SEEN  [facility]  
   
                                        Comment on above:   Performed By: #### U  
MICRO, ERUR ####  
[facility]  
[address] [place] [address]  
Dr. Nikole Jacobs   
   
                                                    Bacteria identified   
Cx Nom (U)      INDICATED       Normal                          [facility]  
   
                                        Comment on above:   Performed By: #### U  
MICRO, ERUR ####  
[facility]  
[address] [place] [address]  
Dr. Nikole Jacobs   
   
                      CAST       NONE SEEN  Normal     NONE SEEN  [facility]  
   
                                        Comment on above:   Performed By: #### U  
MICRO, ERUR ####  
[facility]  
[address] [place] [address]  
Dr. Nikole Jacobs   
   
                                                    Crystals LM Nom   
(Urine sed)     SEEN            Abnormal        NONE SEEN       [facility]  
   
                                        Comment on above:   Performed By: #### U  
MICRO, ERUR ####  
[facility]  
[address] [place] [address]  
Dr. Nikole Jacobs   
   
                                                    Epithelial cells LM   
Ql (Urine sed)      MODERATE            Abnormal            NONE SEEN   
/RARE                                   [facility]  
   
                                        Comment on above:   Performed By: #### U  
MICRO, ERUR ####  
[facility]  
[address] [place] [address]  
Dr. Nikole Jacobs   
   
                      MUCOUS     NONE SEEN  Normal     NONE SEEN  [facility]  
   
                                        Comment on above:   Performed By: #### U  
MICRO, ERUR ####  
[facility]  
[address] [place] [address]  
Dr. Nikole Jacobs   
   
                      RBC        2-5        Abnormal   0-2        [facility]  
   
                                        Comment on above:   Performed By: #### U  
MICRO, ERUR ####  
[facility]  
[address] [place] [address]  
Dr. Nikole Jacobs   
   
                      WBC        10-20      Abnormal   NONE SEEN  [facility]  
   
                                        Comment on above:   Performed By: #### U  
MICRO, ERUR ####  
[facility]  
[address] [place] [address]  
Dr. Nikole Jacobs   
   
                                                    CULTURE URINEon 2023   
   
                                        CULTURE URINE       Culture Observations  
:  
MODERATE GROWTH OF   
MIXED GENITAL ARMAND.   
NO POTENTIAL PATHOGENS   
SEEN.               Normal                                  [facility]  
   
                                        Comment on above:   Performed By: #### C  
MP ####  
[facility]  
[address] [place] [address]  
Dr. Nikole Jacobs   
   
                                                    UA RANDOM W/MICROSCOPICon    
   
                      BACTERIA   SMALL      Abnormal   NONE SEEN  [facility]  
   
                                        Comment on above:   Performed By: #### U  
MICRO, ERUR ####  
[facility]  
[address] [place] [address]  
Dr. Nikole Jacobs   
   
                      Bilirubin Ql (U) MODERATE   Abnormal   NEGATIVE   [facility]  
   
                                        Comment on above:   Performed By: #### U  
MICRO, ERUR ####  
[facility]  
[address] [place] [address]  
Dr. Nikole Jacobs   
   
                      CAST       NONE SEEN  Normal     NONE SEEN  [facility]  
   
                                        Comment on above:   Performed By: #### U  
MICRO, ERUR ####  
[facility]  
[address] [place] [address]  
Dr. Nikole Jacobs   
   
                      Clarity (U) CLEAR      Normal     CLEAR      [facility]  
   
                                        Comment on above:   Performed By: #### U  
MICRO, ERUR ####  
[facility]  
[address] [place] [address]  
Dr. Nikole Jacobs   
   
                      Color (U)  LT. YELLOW Normal     YELLOW     [facility]  
   
                                        Comment on above:   Performed By: #### U  
MICRO, ERUR ####  
[facility]  
[address] [place] [address]  
Dr. Nikole Jacobs   
   
                                                    Crystals LM Nom   
(Urine sed)     NONE SEEN       Normal          NONE SEEN       [facility]  
   
                                        Comment on above:   Performed By: #### U  
MICRO, ERUR ####  
[facility]  
[address] [place] [address]  
Dr. Nikole Jacobs   
   
                                                    Epithelial cells LM   
Ql (Urine sed)      FEW                 Abnormal            NONE SEEN   
/RARE                                   [facility]  
   
                                        Comment on above:   Performed By: #### U  
MICRO, ERUR ####  
[facility]  
[address] [address]  
Dr. Nikole Jacobs   
   
                      Glucose Ql (U) Negative   Normal     NEGATIVE   [facility]  
   
                                        Comment on above:   Performed By: #### U  
MICRO, ERUR ####  
[facility]  
[address] [address]  
Dr. Nikole Jacobs   
   
                      Hemoglobin Ql (U) TRACE-INTACT Abnormal   NEGATIVE   [facility]  
   
                                        Comment on above:   Performed By: #### U  
MICRO, ERUR ####  
[facility]  
[address] [address]  
Dr. Nikole Jacobs   
   
                      Ketones Ql (U) TRACE      Abnormal   NEGATIVE   [facility]  
   
                                        Comment on above:   Performed By: #### U  
MICRO, ERUR ####  
[facility]  
[address] [address]  
Dr. Nikole Jacobs   
   
                      LEUKOCYTES MODERATE   Abnormal   NEGATIVE   [facility]  
   
                                        Comment on above:   Performed By: #### U  
MICRO, ERUR ####  
[facility]  
[address] [address]  
Dr. Nikole Jacobs   
   
                      MUCOUS     NONE SEEN  Normal     NONE SEEN  [facility]  
   
                                        Comment on above:   Performed By: #### U  
MICRO, ERUR ####  
[facility]  
[address] [address]  
Dr. Nikole Jacobs   
   
                      Nitrite Ql (U) Negative   Normal     NEGATIVE   [facility]  
   
                                        Comment on above:   Performed By: #### U  
MICRO, ERUR ####  
[facility]  
[address] [address]  
Dr. Nikole Jacobs   
   
                      pH (U)     5.5 [pH]   Normal     5-9        [facility]  
   
                                        Comment on above:   Performed By: #### U  
MICRO, ERUR ####  
[facility]  
[address] [address]  
Dr. Nikole Jacobs   
   
                      RBC        0-2        Normal     0-2        [facility]  
   
                                        Comment on above:   Performed By: #### U  
MICRO, ERUR ####  
[facility]  
[address] [address]  
Dr. Nikole Jacobs   
   
                      SPEC GRAVITY >=1.030    Abnormal   1.005-<=1.025 [facility]  
   
                                        Comment on above:   Performed By: #### U  
MICRO, ERUR ####  
[facility]  
[address] [address]  
Dr. Nikole Jacobs   
   
                          UA PROTEIN   TRACE        Normal       NEGATIVE/   
TRACE                                   [facility]  
   
                                        Comment on above:   Performed By: #### U  
MICRO, ERUR ####  
[facility]  
[address] [place], [place] [address]  
Dr. Nikole Jacobs   
   
                      Urobilinogen Qn (U) 0.2 {Enma'U}/dL Normal     0.2 - 1.  
0  [facility]  
   
                                        Comment on above:   Performed By: #### U  
MICRO, ERUR ####  
[facility]  
[address] [address]  
Dr. Nikole Jacobs   
   
                      WBC        20-50      Abnormal   NONE SEEN  [facility]  
   
                                        Comment on above:   Performed By: #### U  
MICRO, ERUR ####  
[facility]  
[address] [address]  
Dr. Nikole Jacobs   
   
                                                    US KIDNEYSon 2023   
   
                                        US KIDNEYS          EXAM: US KIDNEYS  
HISTORY: Abnormal   
renal function  
COMPARISON: None.  
TECHNIQUE: Multiple   
sonographic images of   
the kidneys and   
urinary bladder were  
obtained, supplemented   
with Doppler.  
FINDINGS: The right   
kidney measures 9.2 x   
4.4 x 6.8 cm, with   
cortical thickness  
of 15 mm. No focal   
abnormality is   
identified within.   
There is no evidence   
of  
hydronephrosis.  
The left kidney   
measures 9.8 x 4.7 x   
6.4 cm. The cortex   
measures 11 mm in  
thickness. An   
echogenic focus is   
present measuring 0.5   
x 0.4 x 0.4 cm. No   
focal  
abnormality is   
otherwise identified   
within. There is no   
evidence of  
hydronephrosis.  
The urinary bladder is   
not very well   
distended, with a   
calculated volume of   
27  
cc. Incidentally noted   
is a leiomyoma in the   
uterus measuring 2.0 x   
2.6 x 2.0  
cm.  
IMPRESSION:  
An echogenic focus in   
the upper pole of the   
left kidney may be a   
renal calculus.  
There is no evidence   
of an intrarenal mass   
on either side, and no   
evidence of  
hydronephrosis.  
The urinary bladder   
evaluation is limited   
by the lack of   
distention.  
Electronically   
authenticated by:   
SANG COULTER Date:   
2023 13:20    Normal                                  [facility]  
   
                                                    CBC AUTO DIFFon 2023   
   
                      BASO #     0.1 103/ul Normal     0.0-0.1    [facility]  
   
                                        Comment on above:   Performed By: #### U  
MICRO, ERUR ####  
[facility]  
[address] [place] [address]  
Dr. Nikole Jacobs   
   
                                                    Basophils/100 WBC   
(Bld)           0.4 %           Normal          0.2-2.0         [facility]  
   
                                        Comment on above:   Performed By: #### U  
MICRO, ERUR ####  
[facility]  
[address] [place] [address]  
Dr. Nikole Jacobs   
   
                      EO #       0.1 103/ul Normal     0.0-0.7    [facility]  
   
                                        Comment on above:   Performed By: #### U  
MICRO, ERUR ####  
[facility]  
[address] [place] [address]  
Dr. Nikole Jacobs   
   
                                                    Eosinophils/100 WBC   
(Bld)           0.9 %           Normal          0.9-7.0         [facility]  
   
                                        Comment on above:   Performed By: #### U  
MICRO, ERUR ####  
[facility]  
[address] [place] [address]  
Dr. Nikole Jacobs   
   
                                                    Erythrocyte   
distribution width   
(RBC) [Ratio]   13.6 %          Normal          11.0-15.0       [facility]  
   
                                        Comment on above:   Performed By: #### U  
MICRO, ERUR ####  
[facility]  
[address] [place] [address]  
Dr. Nikole Jacobs   
   
                                                    Hematocrit (Bld)   
[Volume fraction] 33.4 %          Critically low  36.0-48.0       [facility]  
   
                                        Comment on above:   Performed By: #### U  
MICRO, ERUR ####  
[facility]  
[address] [place] [address]  
Dr. Nikole Jacobs   
   
                                                    Hemoglobin (Bld)   
[Mass/Vol]      10.9 g/dL       Critically low  12.0-16.0       [facility]  
   
                                        Comment on above:   Performed By: #### U  
MICRO, ERUR ####  
[facility]  
[address] [place] [address]  
Dr. Nikole Jacobs   
   
                      IG #       0.21 10e3/ul Critically high 0.00-0.03  [facility]  
   
                                        Comment on above:   Performed By: #### U  
MICRO, ERUR ####  
[facility]  
[address] [address]  
Dr. Nikole Jacobs   
   
                      IG %       1.5 %      Critically high 0.0-0.5    [facility]  
   
                                        Comment on above:   Performed By: #### U  
MICRO, ERUR ####  
[facility]  
[address] [address]  
Dr. Nikole Jacobs   
   
                      LYMPH #    2.8 103/ul Normal     1.2-3.8    [facility]  
   
                                        Comment on above:   Performed By: #### U  
MICRO, ERUR ####  
[facility]  
[address] [address]  
Dr. Nikole Jacobs   
   
                                                    Lymphocytes/100 WBC   
(Bld)           19.9 %          Critically low  20.5-60.0       [facility]  
   
                                        Comment on above:   Performed By: #### U  
MICRO, ERUR ####  
[facility]  
[address] [address]  
Dr. Nikole Jacobs   
   
                      MANUAL DIFF REQ NO         Normal                [facility]  
   
                                        Comment on above:   Performed By: #### U  
MICRO, ERUR ####  
[facility]  
[address] [address]  
Dr. Nikole Jacobs   
   
                                                    MCH (RBC) [Entitic   
mass]           31.6 pg         Normal          26.7-34.0       [facility]  
   
                                        Comment on above:   Performed By: #### U  
MICRO, ERUR ####  
[facility]  
[address] [address]  
Dr. Nikole Jacobs   
   
                                                    MCHC (RBC)   
[Mass/Vol]      32.6 g/dL       Normal          29.9-35.2       [facility]  
   
                                        Comment on above:   Performed By: #### U  
MICRO, ERUR ####  
[facility]  
[address] [address]  
Dr. Nikole Jacobs   
   
                                                    MCV (RBC) [Entitic   
vol]            96.8 fL         Normal          81.0-99.0       [facility]  
   
                                        Comment on above:   Performed By: #### U  
MICRO, ERUR ####  
[facility]  
[address] [address]  
Dr. Nikole Jacobs   
   
                      MONO #     1.3 103/ul Critically high 0.3-0.8    [facility]  
   
                                        Comment on above:   Performed By: #### U  
MICRO, ERUR ####  
[facility]  
[address] [address]  
Dr. Nikole Jacobs   
   
                                                    Monocytes/100 WBC   
(Bld)           9.1 %           Normal          1.7-12.0        [facility]  
   
                                        Comment on above:   Performed By: #### U  
MICRO, ERUR ####  
[facility]  
[address] [address]  
Dr. Nikole Jacobs   
   
                      NEUT #     9.5 103/ul Critically high 1.4-6.5    [facility]  
   
                                        Comment on above:   Performed By: #### U  
MICRO, ERUR ####  
[facility]  
[address] [place] [address]  
Dr. Nikole Jacobs   
   
                                                    Neutrophils/100 WBC   
(Bld)           68.2 %          Normal          43.0-75.0       [facility]  
   
                                        Comment on above:   Performed By: #### U  
MICRO, ERUR ####  
[facility]  
[address] [place] [address]  
Dr. Nikole Jacobs   
   
                                                    Platelet mean   
volume (Bld)   
[Entitic vol]   9.4 fL          Critically low  9.5-13.5        [facility]  
   
                                        Comment on above:   Performed By: #### U  
MICRO, ERUR ####  
[facility]  
[address] [place] [address]  
Dr. Nikole Jacobs   
   
                      PLT        476 103/ul Critically high 150-450    [facility]  
   
                                        Comment on above:   Performed By: #### U  
MICRO, ERUR ####  
[facility]  
[address] [place] [address]  
Dr. Nikole Jacobs   
   
                      RBC        3.45 106/ul Critically low 4.20-5.40  [facility]  
   
                                        Comment on above:   Performed By: #### U  
MICRO, ERUR ####  
[facility]  
[address] [place] [address]  
Dr. Nikole Jacobs   
   
                      WBC        14.0 103/ul Critically high 4.0-11.0   [facility]  
   
                                        Comment on above:   Performed By: #### U  
MICRO, ERUR ####  
[facility]  
[address] [place] [address]  
Dr. Nikole Jacobs   
   
                                                    PROF 14(COMP METB)on   
023   
   
                      Albumin [Mass/Vol] 3.6 g/dL   Normal     3.4-5.0    [facility]  
   
                                        Comment on above:   Performed By: #### C  
MP ####  
[facility]  
[address] [place] [address]  
Dr. Nikole Jacobs   
   
                                                    Albumin/Globulin   
[Mass ratio]    1.2 {ratio}     Normal                          [facility]  
   
                                        Comment on above:   Performed By: #### C  
MP ####  
[facility]  
[address] [place] [address]  
Dr. Nikole Jacobs   
   
                                                    ALP [Catalytic   
activity/Vol]   83 U/L          Normal                    [facility]  
   
                                        Comment on above:   Performed By: #### C  
MP ####  
[facility]  
[address] [place] [address]  
Dr. Nikole Jacobs   
   
                                                    ALT [Catalytic   
activity/Vol]   24 U/L          Normal          14-59           [facility]  
   
                                        Comment on above:   Performed By: #### C  
MP ####  
[facility]  
[address] [place] [address]  
Dr. Nikole Jacobs   
   
                                                    Anion gap   
[Moles/Vol]     14.4 mmol/L     Normal                          [facility]  
   
                                        Comment on above:   Performed By: #### C  
MP ####  
[facility]  
[address] [place] [address]  
Dr. Nikole Jacobs   
   
                                                    AST [Catalytic   
activity/Vol]   15 U/L          Normal          15-37           [facility]  
   
                                        Comment on above:   Performed By: #### C  
MP ####  
[facility]  
[address] [place] [address]  
Dr. Nikole Jacobs   
   
                                                    Bilirubin   
[Mass/Vol]      0.3 mg/dL       Normal          0.2-1.0         [facility]  
   
                                        Comment on above:   Performed By: #### C  
MP ####  
[facility]  
[address] [place] [address]  
Dr. Nikole Jacobs   
   
                      Calcium [Mass/Vol] 9.3 mg/dL  Normal     8.5-10.1   [facility]  
   
                                        Comment on above:   Performed By: #### C  
MP ####  
[facility]  
[address] [place] [address]  
Dr. Nikole Jacobs   
   
                                                    Chloride   
[Moles/Vol]     100 mmol/L      Normal                    [facility]  
   
                                        Comment on above:   Performed By: #### C  
MP ####  
[facility]  
[address] [address]  
Dr. Nikole Jacobs   
   
                      CO2 [Moles/Vol] 26.8 mmol/L Normal     21.0-32.0  [facility]  
   
                                        Comment on above:   Performed By: #### C  
MP ####  
[facility]  
[address] [address]  
Dr. Nikole Jacobs   
   
                                                    Creatinine   
[Mass/Vol]      2.20 mg/dL      Critically high 0.55-1.02       [facility]  
   
                                        Comment on above:   Performed By: #### C  
MP ####  
[facility]  
[address] [address]  
Dr. Nikole Jacobs   
   
                      EGFR-AF AMERICAN 27 mL/min/1.73m2 Critically low >=60       
  [facility]  
   
                                        Comment on above:   Performed By: #### C  
MP ####  
[facility]  
[address] [address]  
Dr. Nikole Jacobs   
   
                                                    EGFR-NON AF   
AMERICAN        22 mL/min/1.73m2 Critically low  >=60            [facility]  
   
                                        Comment on above:   Performed By: #### C  
MP ####  
[facility]  
[address] [address]  
Dr. Nikole Jacobs   
   
                                                    Globulin (S)   
[Mass/Vol]      3.1 g/dL        Normal                          [facility]  
   
                                        Comment on above:   Performed By: #### C  
MP ####  
[facility]  
[address] [address]  
Dr. iNkole Jacobs   
   
                      Glucose [Mass/Vol] 126 mg/dL  Critically high      [facility]  
   
                                        Comment on above:   Performed By: #### C  
MP ####  
[facility]  
[address] [address]  
Dr. Nikole Jacobs   
   
                                                    Potassium   
[Moles/Vol]     4.2 mmol/L      Normal          3.5-5.1         [facility]  
   
                                        Comment on above:   Performed By: #### C  
MP ####  
[facility]  
[address] [address]  
Dr. Nikole Jacobs   
   
                      Protein [Mass/Vol] 6.7 g/dL   Normal     6.4-8.2    [facility]  
   
                                        Comment on above:   Performed By: #### C  
MP ####  
[facility]  
[address] [place], [place] [address]  
Dr. Nikole Jacobs   
   
                      Sodium [Moles/Vol] 137 mmol/L Normal     136-145    [facility]  
   
                                        Comment on above:   Performed By: #### C  
MP ####  
[facility]  
[address] [place], [place] [address]  
Dr. Nikole Jacobs   
   
                                                    Urea nitrogen   
[Mass/Vol]      39.0 mg/dL      Critically high 7.0-18.0        [facility]  
   
                                        Comment on above:   Performed By: #### C  
MP ####  
[facility]  
[address] [place], [place] [address]  
Dr. Nikole Jacobs   
   
                                                    Urea   
nitrogen/Creatinine   
[Mass ratio]    17.7 mg/mg      Normal                          [facility]  
   
                                        Comment on above:   Performed By: #### C  
MP ####  
[facility]  
[address] [place], [place] [address]  
Dr. Nikole Jacobs   
   
                                                    MRI LSPINE WO CONon 20   
   
                                        MRI LSPINE WO CON   EXAMINATION: MRI   
LSPINE WO CON  
HISTORY: Spinal   
stenosis  
COMPARISON: No   
relevant comparison   
available.  
TECHNIQUE: A variety   
of imaging planes and   
parameters were   
utilized for  
visualization of   
suspected pathology.  
FINDINGS:  
For the purposes of   
numbering, sagittal T2   
image # 8 extends from   
the T10  
vertebral body   
superiorly to the S3   
level inferiorly.  
PARASPINAL AREA:   
Normal with no visible   
mass.  
BONES: 4 mm   
anterolisthesis of L4   
in relation L5.   
Moderate diffuse   
degenerative  
spondylosis. Areas of   
signal abnormality in   
the T12 and L1   
vertebral body likely  
representing   
hemangiomas.  
CORD/CAUDA EQUINA:   
Normal caliber,   
contour, and signal   
intensity.  
DISC LEVELS:  
12-L1: Moderate disc   
space narrowing and   
disc desiccation. Mild   
posterior disc  
protrusion. No central   
canal or foraminal   
stenosis  
L1-L2: Moderate   
degenerative disc   
disease is present   
without visible neural  
impingement.  
L2-L3: Early   
degenerative disc   
disease is present   
without focal   
protrusion or  
neural impingement.  
L3-L4: Early   
degenerative disc   
disease is present   
without focal   
protrusion or  
neural impingement.  
L4-L5: 4 mm   
anterolisthesis of L4   
on L5. Moderate   
diffuse   
bulge/pseudobulge with  
ligamentum flavum   
hypertrophy and facet   
osteoarthropathy. No   
central canal  
stenosis. Mild right   
and no left foraminal   
stenosis  
L5-S1: Disc   
desiccation. Extension   
of the disc into the   
inferior endplate of   
L5,  
Schmorl's node. Mild   
broad-based posterior   
disc herniation the   
protrusion type  
extending up to 3.2   
mm. No central or   
right foraminal   
stenosis. Mild   
narrowing  
of the left neural   
foramen.  
IMPRESSION:  
Degenerative changes   
resulting in mild   
right L4-L5 and left   
L5-S1 neural  
foraminal stenosis  
Electronically   
authenticated by:   
MARV HERNANDEZ Date:   
2023 16:39    Normal                                  [facility]  
   
                                                    CBC AUTO DIFFon 2022   
   
                      BASO #     0.1 103/ul Normal     0.0-0.1    [facility]  
   
                                        Comment on above:   Performed By: #### C  
BC ####  
[facility]  
[address] [place] [address]  
Dr. Nikole Jacobs   
   
                                                    Basophils/100 WBC   
(Bld)           0.5 %           Normal          0.2-2.0         [facility]  
   
                                        Comment on above:   Performed By: #### C  
BC ####  
[facility]  
[address] [place] [address]  
Dr. Nikole Jacobs   
   
                      EO #       0.1 103/ul Normal     0.0-0.7    [facility]  
   
                                        Comment on above:   Performed By: #### C  
BC ####  
[facility]  
[address] [place] [address]  
Dr. Nikole Jacobs   
   
                                                    Eosinophils/100 WBC   
(Bld)           1.1 %           Normal          0.9-7.0         [facility]  
   
                                        Comment on above:   Performed By: #### C  
BC ####  
[facility]  
[address] [place] [address]  
Dr. Nikole Jacobs   
   
                                                    Erythrocyte   
distribution width   
(RBC) [Ratio]   14.2 %          Normal          11.0-15.0       [facility]  
   
                                        Comment on above:   Performed By: #### C  
BC ####  
[facility]  
[address] [place] [address]  
Dr. Nikole Jacobs   
   
                                                    Hematocrit (Bld)   
[Volume fraction] 32.7 %          Critically low  36.0-48.0       [facility]  
   
                                        Comment on above:   Performed By: #### C  
BC ####  
[facility]  
[address] [place] [address]  
Dr. Nikole Jacobs   
   
                                                    Hemoglobin (Bld)   
[Mass/Vol]      10.9 g/dL       Critically low  12.0-16.0       [facility]  
   
                                        Comment on above:   Performed By: #### C  
BC ####  
[facility]  
[address] [place] [address]  
Dr. Nikole Jacobs   
   
                      IG #       0.22 10e3/ul Critically high 0.00-0.03  [facility]  
   
                                        Comment on above:   Performed By: #### C  
BC ####  
[facility]  
[address] [place] [address]  
Dr. Nikole Jacobs   
   
                      IG %       2.2 %      Critically high 0.0-0.5    [facility]  
   
                                        Comment on above:   Performed By: #### C  
BC ####  
[facility]  
[address] [place] [address]  
Dr. Nikole Jacobs   
   
                      LYMPH #    2.1 103/ul Normal     1.2-3.8    [facility]  
   
                                        Comment on above:   Performed By: #### C  
BC ####  
[facility]  
[address] [place] [address]  
Dr. Nikole Jacobs   
   
                                                    Lymphocytes/100 WBC   
(Bld)           20.9 %          Normal          20.5-60.0       [facility]  
   
                                        Comment on above:   Performed By: #### C  
BC ####  
[facility]  
[address] [place] [address]  
Dr. Nikole Jacobs   
   
                      MANUAL DIFF REQ NO         Normal                [facility]  
   
                                        Comment on above:   Performed By: #### C  
BC ####  
[facility]  
[address] [place] [address]  
Dr. Nikole Jacobs   
   
                                                    MCH (RBC) [Entitic   
mass]           31.9 pg         Normal          26.7-34.0       [facility]  
   
                                        Comment on above:   Performed By: #### C  
BC ####  
[facility]  
[address] [place] [address]  
Dr. Nikole Jacobs   
   
                                                    MCHC (RBC)   
[Mass/Vol]      33.3 g/dL       Normal          29.9-35.2       [facility]  
   
                                        Comment on above:   Performed By: #### C  
BC ####  
[facility]  
[address] [place] [address]  
Dr. Nikole Jacobs   
   
                                                    MCV (RBC) [Entitic   
vol]            95.6 fL         Normal          81.0-99.0       [facility]  
   
                                        Comment on above:   Performed By: #### C  
BC ####  
[facility]  
[address] [address]  
Dr. Nikole Jacobs   
   
                      MONO #     1.0 103/ul Critically high 0.3-0.8    [facility]  
   
                                        Comment on above:   Performed By: #### C  
BC ####  
[facility]  
[address] [address]  
Dr. Nikole Jacobs   
   
                                                    Monocytes/100 WBC   
(Bld)           10.1 %          Normal          1.7-12.0        [facility]  
   
                                        Comment on above:   Performed By: #### C  
BC ####  
[facility]  
[address] [place] [address]  
Dr. Nikole Jacobs   
   
                      NEUT #     6.4 103/ul Normal     1.4-6.5    [facility]  
   
                                        Comment on above:   Performed By: #### C  
BC ####  
[facility]  
[address] [place] [address]  
Dr. Nikole Jacobs   
   
                                                    Neutrophils/100 WBC   
(Bld)           65.2 %          Normal          43.0-75.0       [facility]  
   
                                        Comment on above:   Performed By: #### C  
BC ####  
[facility]  
[address] [place] [address]  
Dr. Nikole Jacobs   
   
                                                    Platelet mean   
volume (Bld)   
[Entitic vol]   8.9 fL          Critically low  9.5-13.5        [facility]  
   
                                        Comment on above:   Performed By: #### C  
BC ####  
[facility]  
[address] [place] [address]  
Dr. Nikole Jacobs   
   
                      PLT        409 103/ul Normal     150-450    [facility]  
   
                                        Comment on above:   Performed By: #### C  
BC ####  
[facility]  
[address] [place] [address]  
Dr. Nikole Jacobs   
   
                      RBC        3.42 106/ul Critically low 4.20-5.40  [facility]  
   
                                        Comment on above:   Performed By: #### C  
BC ####  
[facility]  
[address] [place] [address]  
Dr. Nikole Jacobs   
   
                      WBC        9.8 103/ul Normal     4.0-11.0   [facility]  
   
                                        Comment on above:   Performed By: #### C  
BC ####  
[facility]  
[address] [address]  
Dr. Nikole Jacobs   
   
                                                    PROF 14(COMP METB)on   
022   
   
                      Albumin [Mass/Vol] 3.3 g/dL   Critically low 3.4-5.0    Th  
e [facility]  
   
                                        Comment on above:   Performed By: #### C  
MP ####  
[facility]  
[address] [place] [address]  
Dr. Nikole Jacobs   
   
                                                    Albumin/Globulin   
[Mass ratio]    1.0 {ratio}     Normal                          [facility]  
   
                                        Comment on above:   Performed By: #### C  
MP ####  
[facility]  
[address] [place] [address]  
Dr. Nikole Jacobs   
   
                                                    ALP [Catalytic   
activity/Vol]   79 U/L          Normal                    [facility]  
   
                                        Comment on above:   Performed By: #### C  
MP ####  
[facility]  
[address] [place] [address]  
Dr. Nikole Jacobs   
   
                                                    ALT [Catalytic   
activity/Vol]   31 U/L          Normal          14-59           [facility]  
   
                                        Comment on above:   Performed By: #### C  
MP ####  
[facility]  
[address] [place] [address]  
Dr. Nikole Jacobs   
   
                                                    Anion gap   
[Moles/Vol]     13.3 mmol/L     Normal                          [facility]  
   
                                        Comment on above:   Performed By: #### C  
MP ####  
[facility]  
[address] [place] [address]  
Dr. Nikole Jacobs   
   
                                                    AST [Catalytic   
activity/Vol]   20 U/L          Normal          15-37           [facility]  
   
                                        Comment on above:   Performed By: #### C  
MP ####  
[facility]  
[address] [place] [address]  
Dr. Nikole Jacobs   
   
                                                    Bilirubin   
[Mass/Vol]      0.5 mg/dL       Normal          0.2-1.0         [facility]  
   
                                        Comment on above:   Performed By: #### C  
MP ####  
[facility]  
[address] [address]  
Dr. Nikole Jacobs   
   
                      Calcium [Mass/Vol] 9.2 mg/dL  Normal     8.5-10.1   [facility]  
   
                                        Comment on above:   Performed By: #### C  
MP ####  
[facility]  
[address] [address]  
Dr. Nikole Jacobs   
   
                                                    Chloride   
[Moles/Vol]     101 mmol/L      Normal                    [facility]  
   
                                        Comment on above:   Performed By: #### C  
MP ####  
[facility]  
[address] [address]  
Dr. Nikole Jacobs   
   
                      CO2 [Moles/Vol] 27.9 mmol/L Normal     21.0-32.0  [facility]  
   
                                        Comment on above:   Performed By: #### C  
MP ####  
[facility]  
[address] [address]  
Dr. Nikole Jacobs   
   
                                                    Creatinine   
[Mass/Vol]      1.36 mg/dL      Critically high 0.55-1.02       [facility]  
   
                                        Comment on above:   Performed By: #### C  
MP ####  
[facility]  
[address] [address]  
Dr. Nikole Jacobs   
   
                      EGFR-AF AMERICAN 47 mL/min/1.73m2 Critically low >=60       
  [facility]  
   
                                        Comment on above:   Performed By: #### C  
MP ####  
[facility]  
[address] [address]  
Dr. Nikole Jacobs   
   
                                                    EGFR-NON AF   
AMERICAN        39 mL/min/1.73m2 Critically low  >=60            [facility]  
   
                                        Comment on above:   Performed By: #### C  
MP ####  
[facility]  
[address] [address]  
Dr. Nikole Jacobs   
   
                                                    Globulin (S)   
[Mass/Vol]      3.4 g/dL        Normal                          [facility]  
   
                                        Comment on above:   Performed By: #### C  
MP ####  
[facility]  
[address] [address]  
Dr. Nikole Jacobs   
   
                      Glucose [Mass/Vol] 120 mg/dL  Critically high      T  
[facility]  
   
                                        Comment on above:   Performed By: #### C  
MP ####  
[facility]  
[address] [address]  
Dr. Nikole Jacobs   
   
                                                    Potassium   
[Moles/Vol]     5.2 mmol/L      Critically high 3.5-5.1         [facility]  
   
                                        Comment on above:   Performed By: #### C  
MP ####  
[facility]  
[address] [address]  
Dr. Nikole Jacobs   
   
                      Protein [Mass/Vol] 6.7 g/dL   Normal     6.4-8.2    [facility]  
   
                                        Comment on above:   Performed By: #### C  
MP ####  
[facility]  
[address] [address]  
Dr. Nikole Jacobs   
   
                      Sodium [Moles/Vol] 137 mmol/L Normal     136-145    [facility]  
   
                                        Comment on above:   Performed By: #### C  
MP ####  
[facility]  
[address] [address]  
Dr. Nikole Jacobs   
   
                                                    Urea nitrogen   
[Mass/Vol]      32.0 mg/dL      Critically high 7.0-18.0        [facility]  
   
                                        Comment on above:   Performed By: #### C  
MP ####  
[facility]  
[address] [place] [address]  
Dr. Nikole Jacobs   
   
                                                    Urea   
nitrogen/Creatinine   
[Mass ratio]    23.5 mg/mg      Normal                          [facility]  
   
                                        Comment on above:   Performed By: #### C  
MP ####  
[facility]  
[address] [place] [address]  
Dr. Nikole Jacobs   
   
                                                    BNPon 12-   
   
                                                    Natriuretic peptide   
B (Bld) [Mass/Vol] 455.0 pg/mL     Normal          <=900.0         [facility]  
   
                                        Comment on above:   Performed By: #### C  
MP ####  
[facility]  
[address] [place] [address]  
Dr. Nikole Jacobs   
   
                                                    CARDIAC SUYAPA ADMITon 12-15-2  
022   
   
                                                    CK [Catalytic   
activity/Vol]   61 U/L          Normal                    [facility]  
   
                                        Comment on above:   Performed By: #### C  
MP ####  
[facility]  
[address] [place] [address]  
Dr. Nikole Jacobs   
   
                      CK.MB [Mass/Vol] 3.26 ng/mL Normal     <=3.60     [facility]  
   
                                        Comment on above:   Performed By: #### C  
MP ####  
[facility]  
[address] [place] [address]  
Dr. Nikole Jacobs   
   
                      HSTROP     27.9 pg/mL Normal     4.0-51.3   [facility]  
   
                                        Comment on above:   Result Comment: CUT-  
OFF POINTS HAVE BEEN ESTABLISHED BASED ON   
THE   
FOURTH UNIVERSAL DEFINITIONS OF MYOCARDIAL  
INFARCTION. THE UPPER REFERENCE LIMIT (URL) OF TROPONIN, DEFINED   
AS THE 99TH PERCENTILE OF  
cTnI DISTRIBUTION IN A REFERENCE POPULATION, HAS BEEN CONFIRMED AS   
THE DECISION THRESHOLD  
FOR MI DIAGNOSIS.   
   
                                                            Performed By: #### C  
MP ####  
[facility]  
[address] [place] [address]  
Dr. Nikole Jacobs   
   
                      YOLI        113 ng/mL  Critically high 9-82       [facility]  
   
                                        Comment on above:   Performed By: #### C  
MP ####  
[facility]  
[address] [place] [address]  
Dr. Nikole Jacobs   
   
                                                    CBC AUTO DIFFon 12-   
   
                      BASO #     0.1 103/ul Normal     0.0-0.1    [facility]  
   
                                        Comment on above:   Performed By: #### C  
BC ####  
[facility]  
[address] [place] [address]  
Dr. Nikole Jacobs   
   
                                                    Basophils/100 WBC   
(Bld)           0.4 %           Normal          0.2-2.0         [facility]  
   
                                        Comment on above:   Performed By: #### C  
BC ####  
[facility]  
[address] [place] [address]  
Dr. Nikole Jacobs   
   
                      EO #       0.1 103/ul Normal     0.0-0.7    [facility]  
   
                                        Comment on above:   Performed By: #### C  
BC ####  
[facility]  
[address] [place] [address]  
Dr. Nikole Jacobs   
   
                                                    Eosinophils/100 WBC   
(Bld)           0.5 %           Critically low  0.9-7.0         [facility]  
   
                                        Comment on above:   Performed By: #### C  
BC ####  
[facility]  
[address] [place] [address]  
Dr. Nikole Jacobs   
   
                                                    Erythrocyte   
distribution width   
(RBC) [Ratio]   13.6 %          Normal          11.0-15.0       [facility]  
   
                                        Comment on above:   Performed By: #### C  
BC ####  
[facility]  
[address] [place] [address]  
Dr. Nikole Jacobs   
   
                                                    Hematocrit (Bld)   
[Volume fraction] 34.5 %          Critically low  36.0-48.0       [facility]  
   
                                        Comment on above:   Performed By: #### C  
BC ####  
[facility]  
[address] [address]  
Dr. Nikole Jacobs   
   
                                                    Hemoglobin (Bld)   
[Mass/Vol]      11.5 g/dL       Critically low  12.0-16.0       [facility]  
   
                                        Comment on above:   Performed By: #### C  
BC ####  
[facility]  
[address] [address]  
Dr. Nikloe Jacobs   
   
                      IG #       0.48 10e3/ul Critically high 0.00-0.03  [facility]  
   
                                        Comment on above:   Performed By: #### C  
BC ####  
[facility]  
[address] [place] [address]  
Dr. Nikole Jacobs   
   
                      IG %       3.2 %      Critically high 0.0-0.5    [facility]  
   
                                        Comment on above:   Performed By: #### C  
BC ####  
[facility]  
[address] [place] [address]  
Dr. Nikole Jacobs   
   
                      LYMPH #    1.8 103/ul Normal     1.2-3.8    [facility]  
   
                                        Comment on above:   Performed By: #### C  
BC ####  
[facility]  
[address] [place] [address]  
Dr. Nikole Jacobs   
   
                                                    Lymphocytes/100 WBC   
(Bld)           12.1 %          Critically low  20.5-60.0       [facility]  
   
                                        Comment on above:   Performed By: #### C  
BC ####  
[facility]  
[address] [place] [address]  
Dr. Nikole Jacobs   
   
                      MANUAL DIFF REQ NO         Normal                [facility]  
   
                                        Comment on above:   Performed By: #### C  
BC ####  
[facility]  
[address] [place] [address]  
Dr. Nikole Jacobs   
   
                                                    MCH (RBC) [Entitic   
mass]           31.9 pg         Normal          26.7-34.0       [facility]  
   
                                        Comment on above:   Performed By: #### C  
BC ####  
[facility]  
[address] [place] [address]  
Dr. Nikole Jacobs   
   
                                                    MCHC (RBC)   
[Mass/Vol]      33.3 g/dL       Normal          29.9-35.2       [facility]  
   
                                        Comment on above:   Performed By: #### C  
BC ####  
[facility]  
[address] [id number]  
Dr. Nikole Jacobs   
   
                                                    MCV (RBC) [Entitic   
vol]            95.6 fL         Normal          81.0-99.0       [facility]  
   
                                        Comment on above:   Performed By: #### C  
BC ####  
[facility]  
[address] [id number]  
Dr. Nikole Jacobs   
   
                      MONO #     1.4 103/ul Critically high 0.3-0.8    [facility]  
   
                                        Comment on above:   Performed By: #### C  
BC ####  
[facility]  
[address] [address]  
Dr. Nikole Jacobs   
   
                                                    Monocytes/100 WBC   
(Bld)           9.6 %           Normal          1.7-12.0        [facility]  
   
                                        Comment on above:   Performed By: #### C  
BC ####  
[facility]  
[address] [address]  
Dr. Nikole Jacobs   
   
                      NEUT #     11.1 103/ul Critically high 1.4-6.5    [facility]  
   
                                        Comment on above:   Performed By: #### C  
BC ####  
[facility]  
[address] [address]  
Dr. Nikole Jacobs   
   
                                                    Neutrophils/100 WBC   
(Bld)           74.2 %          Normal          43.0-75.0       [facility]  
   
                                        Comment on above:   Performed By: #### C  
BC ####  
[facility]  
[address] [address]  
Dr. Nikole Jacobs   
   
                                                    Platelet mean   
volume (Bld)   
[Entitic vol]   9.4 fL          Critically low  9.5-13.5        [facility]  
   
                                        Comment on above:   Performed By: #### C  
BC ####  
[facility]  
[address] [address]  
Dr. Nikole Jacobs   
   
                      PLT        415 103/ul Normal     150-450    [facility]  
   
                                        Comment on above:   Performed By: #### C  
BC ####  
[facility]  
[address] [id number]  
Dr. Nikole Jacobs   
   
                      RBC        3.61 106/ul Critically low 4.20-5.40  [facility]  
   
                                        Comment on above:   Performed By: #### C  
BC ####  
[facility]  
[address] [address]  
Dr. Nikole Jacobs   
   
                      WBC        15.0 103/ul Critically high 4.0-11.0   [facility]  
   
                                        Comment on above:   Performed By: #### C  
BC ####  
[facility]  
[address] [place] [address]  
Dr. Nikole Jacobs   
   
                                                    FREE THYROXINE INDEX T7on 12  
-   
   
                      FTI        2.70       Normal     1.30-4.50  [facility]  
   
                                        Comment on above:   Performed By: #### C  
MP ####  
[facility]  
[address] [place] [address]  
Dr. Nikole Jacobs   
   
                      T3U        36.0 %     Normal     30.0-39.0  [facility]  
   
                                        Comment on above:   Performed By: #### C  
MP ####  
[facility]  
[address] [place] [address]  
Dr. Nikole Jacobs   
   
                      T4 [Mass/Vol] 7.50 ug/dL Normal     4.80-13.90 [facility]  
   
                                        Comment on above:   Performed By: #### C  
MP ####  
[facility]  
[address] [place] [address]  
Dr. Nikole Jacobs   
   
                                                    IRONon 12-   
   
                      Iron [Mass/Vol] 104.0 ug/dL Normal     50.0-170.0 [facility]  
   
                                        Comment on above:   Performed By: #### C  
MP ####  
[facility]  
[address] [place] [address]  
Dr. Nikole Jacobs   
   
                                                    PROF 14(COMP METB)on 12-15-2  
022   
   
                      Albumin [Mass/Vol] 3.3 g/dL   Critically low 3.4-5.0    [facility]  
   
                                        Comment on above:   Performed By: #### C  
MP ####  
[facility]  
[address] [place] [address]  
Dr. Nikole Jacobs   
   
                                                    Albumin/Globulin   
[Mass ratio]    1.0 {ratio}     Normal                          [facility]  
   
                                        Comment on above:   Performed By: #### C  
MP ####  
[facility]  
[address] [place] [address]  
Dr. Nikole Jacobs   
   
                                                    ALP [Catalytic   
activity/Vol]   69 U/L          Normal                    [facility]  
   
                                        Comment on above:   Performed By: #### C  
MP ####  
[facility]  
[address] [address]  
Dr. Nikole Jacobs   
   
                                                    ALT [Catalytic   
activity/Vol]   36 U/L          Normal          14-59           [facility]  
   
                                        Comment on above:   Performed By: #### C  
MP ####  
[facility]  
[address] [address]  
Dr. Nikole Jacobs   
   
                                                    Anion gap   
[Moles/Vol]     13.6 mmol/L     Normal                          [facility]  
   
                                        Comment on above:   Performed By: #### C  
MP ####  
[facility]  
[address] [address]  
Dr. Nikole Jacobs   
   
                                                    AST [Catalytic   
activity/Vol]   16 U/L          Normal          15-37           [facility]  
   
                                        Comment on above:   Performed By: #### C  
MP ####  
[facility]  
[address] [place] [address]  
Dr. Nikole Jacobs   
   
                                                    Bilirubin   
[Mass/Vol]      0.6 mg/dL       Normal          0.2-1.0         [facility]  
   
                                        Comment on above:   Performed By: #### C  
MP ####  
[facility]  
[address] [place] [address]  
Dr. Nikole Jacobs   
   
                      Calcium [Mass/Vol] 9.1 mg/dL  Normal     8.5-10.1   [facility]  
   
                                        Comment on above:   Performed By: #### C  
MP ####  
[facility]  
[address] [place] [address]  
Dr. Nikole Jacobs   
   
                                                    Chloride   
[Moles/Vol]     103 mmol/L      Normal                    [facility]  
   
                                        Comment on above:   Performed By: #### C  
MP ####  
[facility]  
[address] [place] [address]  
Dr. Nikole Jacobs   
   
                      CO2 [Moles/Vol] 27.4 mmol/L Normal     21.0-32.0  [facility]  
   
                                        Comment on above:   Performed By: #### C  
MP ####  
[facility]  
[address] [place] [address]  
Dr. Nikole Jacobs   
   
                                                    Creatinine   
[Mass/Vol]      1.90 mg/dL      Critically high 0.55-1.02       [facility]  
   
                                        Comment on above:   Performed By: #### C  
MP ####  
[facility] Laboratory  
[address] [place] [address]  
Dr. Nikole Jacobs   
   
                      EGFR-AF AMERICAN 32 mL/min/1.73m2 Critically low >=60       
  [facility]  
   
                                        Comment on above:   Performed By: #### C  
MP ####  
[facility]  
[address] [address]  
Dr. Nikole Jacobs   
   
                                                    EGFR-NON AF   
AMERICAN        27 mL/min/1.73m2 Critically low  >=60            [facility]  
   
                                        Comment on above:   Performed By: #### C  
MP ####  
[facility]  
[address] [address]  
Dr. Nikole Jacobs   
   
                                                    Globulin (S)   
[Mass/Vol]      3.4 g/dL        Normal                          [facility]  
   
                                        Comment on above:   Performed By: #### C  
MP ####  
[facility]  
[address] [address]  
Dr. Nikole Jacobs   
   
                      Glucose [Mass/Vol] 122 mg/dL  Critically high      [facility]  
   
                                        Comment on above:   Performed By: #### C  
MP ####  
[facility]  
[address] [address]  
Dr. Nikole Jacobs   
   
                                                    Potassium   
[Moles/Vol]     5.0 mmol/L      Normal          3.5-5.1         [facility]  
   
                                        Comment on above:   Performed By: #### C  
MP ####  
[facility]  
[address] [address]  
Dr. Nikole Jacobs   
   
                      Protein [Mass/Vol] 6.7 g/dL   Normal     6.4-8.2    [facility]  
   
                                        Comment on above:   Performed By: #### C  
MP ####  
[facility]  
[address] [address]  
Dr. Nikole Jacobs   
   
                      Sodium [Moles/Vol] 139 mmol/L Normal     136-145    [facility]  
   
                                        Comment on above:   Performed By: #### C  
MP ####  
[facility]  
[address] [address]  
Dr. Nikole Jacobs   
   
                                                    Urea nitrogen   
[Mass/Vol]      55.0 mg/dL      Critically high 7.0-18.0        [facility]  
   
                                        Comment on above:   Performed By: #### C  
MP ####  
[facility]  
[address] [address]  
Dr. Nikole Jacobs   
   
                                                    Urea   
nitrogen/Creatinine   
[Mass ratio]    28.9 mg/mg      Normal                          [facility]  
   
                                        Comment on above:   Performed By: #### C  
MP ####  
[facility]  
[address] [place], [place] [address]  
Dr. Nikole Jacobs   
   
                                                    TSHon 12-   
   
                      TSH        1.585 uIU/mL Normal     0.358-3.740 [facility]  
   
                                        Comment on above:   Performed By: #### C  
MP ####  
[facility]  
[address] [place], [place] [address]  
Dr. Nikole Jacobs   
   
                                                    SARS-CoV-2on 2022   
   
                                                    SARS-CoV-2   
(COVID-19) RNA   
ROMI+probe Ql (Unsp   
spec)                           Normal                          [facility]  
   
                                        Comment on above:   Performed By: #### C  
OVID ####  
[facility]  
[address] [place] [address]  
[phone]  
: Silvio Nicole MD  
[facility]  
[address]   
[place] [address] [phone]  
: Marv Obregon MD   
   
                                                    SARS-CoV-2   
(COVID-19) RNA   
ROMI+probe Ql (Unsp   
spec)           Not detected    Normal          [facility]  
   
                                        Comment on above:   Result Comment:  
The specimen is NEGATIVE for SARS-CoV-2, the novel coronavirus   
associated with  
COVID-19.  
A negative result does not rule out COVID-19.  
Christine SARS-CoV-2 for use on the [employer] is a   
real-time RT-PCR  
test intended for the qualitative detection of nucleic acids from   
SARS-CoV-2  
in clinician-collected nasal, nasopharyngeal, and oropharyngeal   
swab specimens  
from individuals who meet COVID-19 clinical and/or epidemiological   
criteria.  
Christine SARS-CoV-2 is for use only under Emergency Use Authorization   
(EUA) in  
laboratories certified under Clinical Laboratory Improvement   
Amendments of 1988  
(CLIA), 42 U.S.C. ?263a, that meet requirements to perform high or   
moderate  
complexity tests.  
An individual without symptoms of COVID-19 and who is not shedding   
SARS-CoV-2  
virus would expect to have a negative (not detected) result in   
this assay.  
Fact sheet for Healthcare Providers:   
https://www.fda.gov/media/531230/download  
Fact sheet for Patients: https://www.fda.gov/media/102277/download  
METHODOLOGY: RT-PCR   
   
                                                            Performed By: #### C  
OVID ####  
[address] [address]  
[phone]  
: Silvio Nicole MD  
[address] [place] [address] [phone]  
: Marv Obregon MD   
   
                                                    SARS-CoV-2on 2022   
   
                                                    SARS-CoV-2   
(COVID-19) RNA   
ROMI+probe Ql (Unsp   
spec)           .NASOPHARYNGEAL SWAB Normal                          [facility]  
   
                                        Comment on above:   Performed By: #### C  
OVID ####  
[address] [address]  
[phone]  
: Silvio Nicole MD  
[address] [place] [address] [phone]  
: Marv Obregon MD   
   
                                                    SARS-CoV-2on 2022   
   
                                                    SARS-CoV-2   
(COVID-19) RNA   
ROMI+probe Ql (Unsp   
spec)                           Normal                          [facility]  
   
                                        Comment on above:   Performed By: #### C  
OVID ####  
[address] [address]  
[phone]  
: Silvio Nicole MD  
[facility]  
[address] [place] [address]   
[place] [address] [phone]  
: Marv Obregon MD   
   
                                                    SARS-CoV-2   
(COVID-19) RNA   
ROMI+probe Ql (Unsp   
spec)           Not detected    Normal          [facility]          [facility]  
   
                                        Comment on above:   Result Comment:  
The specimen is NEGATIVE for SARS-CoV-2, the novel coronavirus   
associated with  
COVID-19.  
A negative result does not rule out COVID-19.  
Christine SARS-CoV-2 for use on the [employer] is a   
real-time RT-PCR  
test intended for the qualitative detection of nucleic acids from   
SARS-CoV-2  
in clinician-collected nasal, nasopharyngeal, and oropharyngeal   
swab specimens  
from individuals who meet COVID-19 clinical and/or epidemiological   
criteria.  
Christine SARS-CoV-2 is for use only under Emergency Use Authorization   
(EUA) in  
laboratories certified under Clinical Laboratory Improvement   
Amendments of 1988  
(CLIA), 42 U.S.C. ?263a, that meet requirements to perform high or   
moderate  
complexity tests.  
An individual without symptoms of COVID-19 and who is not shedding   
SARS-CoV-2  
virus would expect to have a negative (not detected) result in   
this assay.  
Fact sheet for Healthcare Providers:   
https://www.fda.gov/media/114558/download  
Fact sheet for Patients: https://www.fda.gov/media/364237/download  
METHODOLOGY: RT-PCR   
   
                                                            Performed By: #### C  
OVID ####  
[address] [address]  
[phone]  
: Silvio Nicole MD  
[address] [place] [address] [phone]  
: Marv Obregon MD   
   
                                                    SARS-CoV-2on 2022   
   
                                                    SARS-CoV-2   
(COVID-19) RNA   
ROMI+probe Ql (Unsp   
spec)           .NASOPHARYNGEAL SWAB Normal                          [facility]  
   
                                        Comment on above:   Performed By: #### C  
OVID ####  
[address] [address]  
[phone]  
: Silvio Nicole MD  
[address] [place] [address] [phone]  
: Marv Obregon MD   
   
                                                    CBC with Diffon 2022   
   
                      Abs. Basophil 0.05 k/uL  Normal     0.00-0.20  [facility]  
   
                                        Comment on above:   Performed By: #### C  
DP, CP, LIPRF, TSH, FT3, VD25, T4, GLYHGB   
####  
[address] [id number] [phone]  
: Silvio Nicole MD   
   
                      Abs.Imm.Granulocyte 0.08 k/uL  Normal     0.00-0.30  [facility]  
   
                                        Comment on above:   Performed By: #### C  
DP, CP, LIPRF, TSH, FT3, VD25, T4, GLYHGB   
####  
[facility]  
[address] [place] [address]  
[phone]  
: Silvio Nicole MD   
   
                                                    Abs.Neutrophil   
(Seg)           11.43 k/uL      High            1.50-8.10       [facility]  
   
                                        Comment on above:   Performed By: #### C  
DP, CP, LIPRF, TSH, FT3, VD25, T4, GLYHGB   
####  
[facility]  
[address] [place] [address]  
[phone]  
: Silvio Nicole MD   
   
                                                    Basophils/100 WBC   
(Bld)           0 %             Normal          0-2             [facility]  
   
                                        Comment on above:   Performed By: #### C  
DP, CP, LIPRF, TSH, FT3, VD25, T4, GLYHGB   
####  
[place] [address]  
[phone]  
: Silvio Nicole MD   
   
                                                    Eosinophils (Bld)   
[#/Vol]         0.05 10*3/uL    Normal          0.00-0.44       [facility]  
   
                                        Comment on above:   Performed By: #### C  
DP, CP, LIPRF, TSH, FT3, VD25, T4, GLYHGB   
####  
[facility]  
[address] [place] [address]  
[phone]  
: Silvio Nicole MD   
   
                                                    Eosinophils/100 WBC   
(Bld)           0 %             Low             1-4             [facility]  
   
                                        Comment on above:   Performed By: #### C  
DP, CP, LIPRF, TSH, FT3, VD25, T4, GLYHGB   
####  
[facility]  
[address] [place] [address]  
[phone]  
: Silvio Nicole MD   
   
                                                    Erythrocyte   
distribution width   
(RBC) [Ratio]   13.1 %          Normal          11.8-14.4       [facility]  
   
                                        Comment on above:   Performed By: #### C  
DP, CP, LIPRF, TSH, FT3, VD25, T4, GLYHGB   
####  
[address] [address]  
[phone]  
: Silvio Nicole MD   
   
                                                    Hematocrit (Bld)   
[Volume fraction] 39.4 %          Normal          36.3-47.1       [facility]  
   
                                        Comment on above:   Performed By: #### C  
DP, CP, LIPRF, TSH, FT3, VD25, T4, GLYHGB   
####  
[address] [address]  
[phone]  
: Silvio Nicole MD   
   
                                                    Hemoglobin (Bld)   
[Mass/Vol]      12.7 g/dL       Normal          11.9-15.1       [facility]  
   
                                        Comment on above:   Performed By: #### C  
DP, CP, LIPRF, TSH, FT3, VD25, T4, GLYHGB   
####  
[address] [address]  
[phone]  
: Silvio Nicole MD   
   
                                                    Immature   
granulocytes/100   
WBC (Bld)       1 %             High            0               [facility]  
   
                                        Comment on above:   Performed By: #### C  
DP, CP, LIPRF, TSH, FT3, VD25, T4, GLYHGB   
####  
[address] [address]  
[phone]  
: Silvio Nicole MD   
   
                                                    Lymphocytes (Bld)   
[#/Vol]         2.24 10*3/uL    Normal          1.10-3.70       [facility]  
   
                                        Comment on above:   Performed By: #### C  
DP, CP, LIPRF, TSH, FT3, VD25, T4, GLYHGB   
####  
[address] [address]  
[phone]  
: Silvio Nicole MD   
   
                                                    Lymphocytes/100 WBC   
(Bld)           15 %            Low             24-43           [facility]  
   
                                        Comment on above:   Performed By: #### C  
DP, CP, LIPRF, TSH, FT3, VD25, T4, GLYHGB   
####  
[address] [id number] [phone]  
: Silvio Nicole MD   
   
                                                    MCH (RBC) [Entitic   
mass]           31.4 pg         Normal          25.2-33.5       [facility]  
   
                                        Comment on above:   Performed By: #### C  
DP, CP, LIPRF, TSH, FT3, VD25, T4, GLYHGB   
####  
[address] [id number] [phone]  
: Silvio Nicole MD   
   
                                                    MCHC (RBC)   
[Mass/Vol]      32.2 g/dL       Normal          28.4-34.8       [facility]  
   
                                        Comment on above:   Performed By: #### C  
DP, CP, LIPRF, TSH, FT3, VD25, T4, GLYHGB   
####  
[address] [address]  
[phone]  
: Silvio Nicole MD   
   
                                                    MCV (RBC) [Entitic   
vol]            97.3 fL         Normal          82.6-102.9      [facility]  
   
                                        Comment on above:   Performed By: #### C  
DP, CP, LIPRF, TSH, FT3, VD25, T4, GLYHGB   
####  
[address] [address]  
[phone]  
: Silvio Nicole MD   
   
                                                    Monocytes (Bld)   
[#/Vol]         1.32 10*3/uL    High            0.10-1.20       [facility]  
   
                                        Comment on above:   Performed By: #### C  
DP, CP, LIPRF, TSH, FT3, VD25, T4, GLYHGB   
####  
[address] [address]  
[phone]  
: Silvio Nicole MD   
   
                                                    Monocytes/100 WBC   
(Bld)           9 %             Normal          3-12            [facility]  
   
                                        Comment on above:   Performed By: #### C  
DP, CP, LIPRF, TSH, FT3, VD25, T4, GLYHGB   
####  
[address] [id number] [phone]  
: Silvio Nicole MD   
   
                      Neutrophil (Seg) 75 %       High       36-65      [facility]  
   
                                        Comment on above:   Performed By: #### C  
DP, CP, LIPRF, TSH, FT3, VD25, T4, GLYHGB   
####  
[address] [address]  
[phone]  
: Silvio Nicole MD   
   
                      NRBC Automated 0.0 per 100 WBC Normal     0.0        [facility]  
   
                                        Comment on above:   Performed By: #### C  
DP, CP, LIPRF, TSH, FT3, VD25, T4, GLYHGB   
####  
[address] [address]  
[phone]  
: Silvio Nicole MD   
   
                                                    Platelet mean   
volume (Bld)   
[Entitic vol]   10.1 fL         Normal          8.1-13.5        [facility]  
   
                                        Comment on above:   Performed By: #### C  
DP, CP, LIPRF, TSH, FT3, VD25, T4, GLYHGB   
####  
[address] [address]  
[phone]  
: Silvio Nicole MD   
   
                                                    Platelets (Bld)   
[#/Vol]         411 10*3/uL     Normal          138-453         [facility]  
   
                                        Comment on above:   Performed By: #### C  
DP, CP, LIPRF, TSH, FT3, VD25, T4, GLYHGB   
####  
[address] [address]  
[phone]  
: Silvio Nicole MD   
   
                      RBC (Bld) [#/Vol] 4.05 10*6/uL Normal     3.95-5.11  [facility]  
   
                                        Comment on above:   Performed By: #### C  
DP, CP, LIPRF, TSH, FT3, VD25, T4, GLYHGB   
####  
[address] [address]  
[phone]  
: Silvio Nicole MD   
   
                      WBC (Bld) [#/Vol] 15.2 10*3/uL High       3.5-11.3   [facility]  
   
                                        Comment on above:   Performed By: #### C  
DP, CP, LIPRF, TSH, FT3, VD25, T4, GLYHGB   
####  
[address] [address] [phone]  
: Silvio Nicole MD   
   
                                                    Comp Metabolic Profon 2022   
   
                      (cont.)               Normal                [facility]  
   
                                        Comment on above:   Result Comment: Aver  
age GFR for 60-69 years old:  
85 mL/min/1.73sq m  
Chronic Kidney Disease:  
<60 mL/min/1.73sq m  
Kidney failure:  
<15 mL/min/1.73sq m  
eGFR calculated using average adult body mass. Additional eGFR   
calculator  
available at:  
http://www.[employer]/multiple_crcl_.htm   
   
                                                            Performed By: #### C  
DP, CP, LIPRF, TSH, FT3, VD25, T4, GLYHGB ####  
[address] [id number] [phone]  
: Silvio Nicole MD   
   
                      Albumin [Mass/Vol] 4.8 g/dL   Normal     3.5-5.2    [facility]  
   
                                        Comment on above:   Performed By: #### C  
DP, CP, LIPRF, TSH, FT3, VD25, T4, GLYHGB   
####  
[facility]  
[address] [place] [id number] [phone]  
: Silvio Nicole MD   
   
                      Albumin/Glob Ratio 1.9        Normal     1.0-2.5    [facility]  
   
                                        Comment on above:   Performed By: #### C  
DP, CP, LIPRF, TSH, FT3, VD25, T4, GLYHGB   
####  
[address] [address]  
[phone]  
: Silvio Nicole MD   
   
                      Alkaline Phos 91 U/L     Normal          [facility]  
   
                                        Comment on above:   Performed By: #### C  
DP, CP, LIPRF, TSH, FT3, VD25, T4, GLYHGB   
####  
[address] [address]  
[phone]  
: Silvio Nicole MD   
   
                                                    ALT [Catalytic   
activity/Vol]   17 U/L          Normal          5-33            [facility]  
   
                                        Comment on above:   Performed By: #### C  
DP, CP, LIPRF, TSH, FT3, VD25, T4, GLYHGB   
####  
[address] [address]  
[phone]  
: Silvio Nicole MD   
   
                                                    Anion gap   
[Moles/Vol]     19 mmol/L       High            9-17            [facility]  
   
                                        Comment on above:   Performed By: #### C  
DP, CP, LIPRF, TSH, FT3, VD25, T4, GLYHGB   
####  
[address] [address]  
[phone]  
: Silvio Nicole MD   
   
                                                    AST [Catalytic   
activity/Vol]   17 U/L          Normal          <32             [facility]  
   
                                        Comment on above:   Performed By: #### C  
DP, CP, LIPRF, TSH, FT3, VD25, T4, GLYHGB   
####  
[address] [address]  
[phone]  
: Silvio Nicole MD   
   
                                                    Bilirubin   
[Mass/Vol]      0.27 mg/dL      Low             0.3-1.2         [facility]  
   
                                        Comment on above:   Performed By: #### C  
DP, CP, LIPRF, TSH, FT3, VD25, T4, GLYHGB   
####  
[address] [address]  
[phone]  
: Silvio Nicole MD   
   
                      Calcium [Mass/Vol] 9.6 mg/dL  Normal     8.6-10.4   [facility]  
   
                                        Comment on above:   Performed By: #### C  
DP, CP, LIPRF, TSH, FT3, VD25, T4, GLYHGB   
####  
[address] [address]  
[phone]  
: Silvio Nicole MD   
   
                                                    Chloride   
[Moles/Vol]     96 mmol/L       Low                       [facility]  
   
                                        Comment on above:   Performed By: #### C  
DP, CP, LIPRF, TSH, FT3, VD25, T4, GLYHGB   
####  
[facility]  
[address] [place] [address]  
[phone]  
: Silvio Nicole MD   
   
                      CO2 [Moles/Vol] 20 mmol/L  Normal     20-31      [facility]  
   
                                        Comment on above:   Performed By: #### C  
DP, CP, LIPRF, TSH, FT3, VD25, T4, GLYHGB   
####  
[facility]  
[address] [place] [address]  
[phone]  
: Silvio Nicole MD   
   
                                                    Creatinine   
[Mass/Vol]      1.06 mg/dL      High            0.50-0.90       [facility]  
   
                                        Comment on above:   Performed By: #### C  
DP, CP, LIPRF, TSH, FT3, VD25, T4, GLYHGB   
####  
[facility]  
[address] [place] [address]  
[phone]  
: Silvio Nicole MD   
   
                      GFR, Amer >60        Normal     >60        [facility]  
   
                                        Comment on above:   Performed By: #### C  
DP, CP, LIPRF, TSH, FT3, VD25, T4, GLYHGB   
####  
[facility]  
[address] [place] [address]  
[phone]  
: Silvio Nicole MD   
   
                                                    GFR,non    
Amer            52 mL/min       Low             >60             [facility]  
   
                                        Comment on above:   Performed By: #### C  
DP, CP, LIPRF, TSH, FT3, VD25, T4, GLYHGB   
####  
[facility]  
[address] [place] [address]  
[phone]  
: Silvio Nicole MD   
   
                      Glucose [Mass/Vol] 101 mg/dL  High       70-99      [facility]  
   
                                        Comment on above:   Performed By: #### C  
DP, CP, LIPRF, TSH, FT3, VD25, T4, GLYHGB   
####  
[facility]  
[address] [place] [address]  
[phone]  
: Silvio Nicole MD   
   
                                                    Potassium   
[Moles/Vol]     4.4 mmol/L      Normal          3.7-5.3         [facility]  
   
                                        Comment on above:   Performed By: #### C  
DP, CP, LIPRF, TSH, FT3, VD25, T4, GLYHGB   
####  
[facility]  
[address] [place] [address]  
[phone]  
: Silvio Nicole MD   
   
                      Protein [Mass/Vol] 7.3 g/dL   Normal     6.4-8.3    [facility]  
   
                                        Comment on above:   Performed By: #### C  
DP, CP, LIPRF, TSH, FT3, VD25, T4, GLYHGB   
####  
[facility]  
[address] [place] [address]  
[phone]  
: Silvio Nicole MD   
   
                      Sodium [Moles/Vol] 135 mmol/L Normal     135-144    [facility]  
   
                                        Comment on above:   Performed By: #### C  
DP, CP, LIPRF, TSH, FT3, VD25, T4, GLYHGB   
####  
[facility]  
[address] [place] [address]  
[phone]  
: Silvio Nicole MD   
   
                                                    Urea nitrogen   
[Mass/Vol]      27 mg/dL        High            8-23            [facility]  
   
                                        Comment on above:   Performed By: #### C  
DP, CP, LIPRF, TSH, FT3, VD25, T4, GLYHGB   
####  
[facility]  
[address] [place] [address]  
[phone]  
: Silvio Nicole MD   
   
                                                    Comprehensive Metabolic Pane  
[facility] 2022   
   
                          Albumin [Mass/Vol] 4.8 g/dL                  3.5 - 5.2  
   
g/dL                                    [facility]  
   
                                                    Albumin/Globulin   
[Mass ratio]    1.9 {ratio}                                     [facility]  
   
                                                    ALP (Bld)   
[Catalytic   
activity/Vol]   91 U/L                          35 - 104 U/L    [facility]  
   
                                                    ALT [Catalytic   
activity/Vol]   17 U/L                          5 - 33 U/L      [facility]  
   
                                                    Anion gap   
[Moles/Vol]     19 mmol/L       High            9 - 17 mmol/L   [facility]  
   
                                                    AST [Catalytic   
activity/Vol]   17 U/L                          <32             [facility]  
   
                                                    Bilirubin   
[Mass/Vol]          0.27 mg/dL          Low                 0.3 - 1.2   
mg/dL                                   [facility]  
   
                          Calcium [Mass/Vol] 9.6 mg/dL                 8.6 - 10.  
4   
mg/dL                                   [facility]  
   
                                                    Chloride   
[Moles/Vol]         96 mmol/L           Low                 98 - 107   
mmol/L                                  [facility]  
   
                          CO2 [Moles/Vol] 20 mmol/L                 20 - 31   
mmol/L                                  [facility]  
   
                                                    Creatinine   
[Mass/Vol]          1.06 mg/dL          High                0.50 - 0.90   
mg/dL                                   [facility]  
   
                                                    Free PSA/Total PSA   
[Mass fraction]     7.3 g/dL                                6.4 - 8.3   
g/dL                                    [facility]  
   
                                                    GFR    
American        >60                             >60 mL/min      [facility]  
   
                                                    GFR Non-   
American        52 mL/min       Low             >60             [facility]  
   
                                                    GFR/1.73 sq   
M.predicted MDRD   
(S/P/Bld) [Vol   
rate/Area]                                                      [facility]  
   
                                        Comment on above:   Average GFR for 60-6  
9 years old:  
85 mL/min/1.73sq m  
Chronic Kidney Disease:  
<60 mL/min/1.73sq m  
Kidney failure:  
<15 mL/min/1.73sq m  
  
  
eGFR calculated using average adult body mass. Additional eGFR   
calculator available at:  
  
http://www.[employer]/multiple_crcl_2012.htm  
  
  
   
   
                      Glucose [Mass/Vol] 101 mg/dL  High       70 - 99 mg/dL [facility]  
   
                                                    Interpretation and   
review of   
laboratory results Abnormal                                        [facility]  
   
                                                    Potassium   
[Moles/Vol]         4.4 mmol/L                              3.7 - 5.3   
mmol/L                                  [facility]  
   
                          Sodium [Moles/Vol] 135 mmol/L                135 - 144  
   
mmol/L                                  [facility]  
   
                                                    Urea nitrogen   
(BldV) [Mass/Vol] 27 mg/dL        High            8 - 23 mg/dL    [facility]  
   
                                                    Hemoglobin A1Con 2022   
   
                      Glucose [Mass/Vol] 146 mg/dL  Normal                [facility]  
   
                                        Comment on above:   Result Comment: The   
ADA and AACC recommend providing the   
estimated   
average glucose result to  
permit better patient understanding of their HBA1c result.   
   
                                                            Performed By: #### C  
DP, CP, LIPRF, TSH, FT3, VD25, T4, GLYHGB ####  
[address] [id number] [phone]  
: Silvio Nicole MD   
   
                                                    HbA1c (Bld) [Mass   
fraction]       6.7 %           High            4.0-6.0         [facility]  
   
                                        Comment on above:   Performed By: #### C  
DP, CP, LIPRF, TSH, FT3, VD25, T4, GLYHGB   
####  
[address] [id number] [phone]  
: Silvio Nicole MD   
   
                                                    Lipid Prof, Fastingon 2022   
   
                                                    Cholesterol   
[Mass/Vol]      193 mg/dL       Normal          <200            [facility]  
   
                                        Comment on above:   Result Comment:  
Cholesterol Guidelines:  
<200 Desirable  
200-240 Borderline  
>240 Undesirable   
   
                                                            Performed By: #### C  
DP, CP, LIPRF, TSH, FT3, VD25, T4, GLYHGB ####  
[address] [id number] [phone]  
: Silvio Nicole MD   
   
                                                    Cholesterol in HDL   
[Mass/Vol]      57 mg/dL        Normal          >40             [facility]  
   
                                        Comment on above:   Result Comment:  
HDL Guidelines:  
<40 Undesirable  
40-59 Borderline  
>59 Desirable   
   
                                                            Performed By: #### C  
DP, CP, LIPRF, TSH, FT3, VD25, T4, GLYHGB ####  
[address] [id number] [phone]  
: Silvio Nicole MD   
   
                                                    Cholesterol in LDL   
[Mass/Vol]      116 mg/dL       Normal          0-130           [facility]  
   
                                        Comment on above:   Result Comment:  
LDL Guidelines:  
<100 Desirable  
100-129 Near to/above Desirable  
130-159 Borderline  
>159 Undesirable  
Direct (measured) LDL and calculated LDL are not interchangeable   
tests.   
   
                                                            Performed By: #### C  
DP, CP, LIPRF, TSH, FT3, VD25, T4, GLYHGB ####  
[address] [id number] [phone]  
: Silvio Nicole MD   
   
                                                    Cholesterol.total/C  
holesterol in HDL   
[Mass ratio]    3.4 {ratio}     Normal          <5              [facility]  
   
                                        Comment on above:   Performed By: #### C  
DP, CP, LIPRF, TSH, FT3, VD25, T4, GLYHGB   
####  
[employer]  
[facility] [place] [address] [phone]  
: Silvio Nicole MD   
   
                                                    Triglyceride,Fastin  
g               100 mg/dL       Normal          <150            [facility]  
   
                                        Comment on above:   Result Comment:  
Triglyceride Guidelines:  
<150 Desirable  
150-199 Borderline  
200-499 High  
>499 Very high  
Based on AHA Guidelines for fasting triglyceride, 2012.   
   
                                                            Performed By: #### C  
DP, CP, LIPRF, TSH, FT3, VD25, T4, GLYHGB ####  
[facility]  
[facility] [place] [address] [phone]  
: Silvio Nicole MD   
   
                                                    Lipid, Fastingon 2022   
   
                                                    Cholesterol   
[Mass/Vol]      193 mg/dL                       <200            [facility]  
   
                                        Comment on above:     
Cholesterol Guidelines:  
<200 Desirable  
200-240 Borderline  
>240 Undesirable  
  
  
   
   
                                                    Cholesterol in HDL   
[Mass/Vol]      57 mg/dL                        >40             [facility]  
   
                                        Comment on above:     
HDL Guidelines:  
<40 Undesirable  
40-59 Borderline  
>59 Desirable  
  
  
   
   
                                                    Cholesterol in LDL   
[Mass/Vol]      116 mg/dL                       0 - 130 mg/dL   [facility]  
   
                                        Comment on above:     
LDL Guidelines:  
<100 Desirable  
100-129 Near to/above Desirable  
130-159 Borderline  
>159 Undesirable  
  
Direct (measured) LDL and calculated LDL are not interchangeable   
tests.  
  
   
   
                                                    Cholesterol.total/C  
holesterol in HDL   
[Mass ratio]    3.4 {ratio}                     <5              [facility]  
   
                                                    Triglyceride,   
Fasting         100 mg/dL                       <150            [facility]  
   
                                        Comment on above:     
Triglyceride Guidelines:  
<150 Desirable  
150-199 Borderline  
200-499 High  
>499 Very high  
Based on AHA Guidelines for fasting triglyceride, 2012.  
  
  
   
   
                                                    No Panel Informationon    
   
                                                                  [facility]  
   
                                                    T3, Freeon 2022   
   
                      Free T3 [Mass/Vol] 4.17 pg/mL Normal     2.02-4.43  [facility]  
   
                                        Comment on above:   Performed By: #### C  
DP, CP, LIPRF, TSH, FT3, VD25, T4, GLYHGB   
####  
[facility]  
[address] [place] [address] [phone]  
: Silvio Nicole MD   
   
                                                    TSHon 2022   
   
                      TSH Qn     3.06 m[IU]/L                       [facility]  
   
                                                    Thyroid Stim. Horm.on 2022   
   
                      Thyroid Stim. Horm. 3.06 uIU/mL Normal     0.30-5.00  [facility]  
   
                                        Comment on above:   Performed By: #### C  
DP, CP, LIPRF, TSH, FT3, VD25, T4, GLYHGB   
####  
[facility]  
[address] [place] [address] [phone]  
: Silvio Nicole MD   
   
                                                    Thyroxine T4on 2022   
   
                      T4 [Mass/Vol] 6.5 ug/dL  Normal     4.5-10.9   [facility]  
   
                                        Comment on above:   Performed By: #### C  
DP, CP, LIPRF, TSH, FT3, VD25, T4, GLYHGB   
####  
[facility]  
[address] [place] [address] [id number] [phone]  
: Silvio Nicole MD   
   
                                                    Vitamin D 25 [place]on 2022  
   
   
                      Vitamin D 25 [place] 57.6 ng/mL Normal     >29.9      [facility]  
   
                                        Comment on above:   Result Comment:  
Reference Range:  
Vitamin D status Range  
Deficiency <20 ng/mL  
Mild Deficiency 20-30 ng/mL  
Sufficiency  ng/mL  
Toxicity >100 ng/mL   
   
                                                            Performed By: #### C  
DP, CP, LIPRF, TSH, FT3, VD25, T4, GLYHGB ####  
[address] [address] [phone]  
: Silvio Nicole MD   
   
                                                    CBC with Auto Differentialon  
 2022   
   
                      Absolute Eos # 0.05                             [facility]  
th  
   
                                                    Absolute Immature   
Granulocyte     0.08                                            [facility]  
   
                      Absolute Lymph # 2.24                             [facility]  
alth  
   
                      Absolute Mono # 1.32       High                  [facility]  
lt  
   
                                                    Basophils (Bld)   
[#/Vol]         0.05 10*3/uL                                    [facility]  
   
                                                    Basophils/100 WBC   
(Bld)           0 %                             0 - 2 %         [facility]  
   
                                                    Eosinophils/100 WBC   
(Bld)           0 %             Low             1 - 4 %         [facility]  
   
                                                    Hematocrit (Bld)   
[Volume fraction] 39.4 %                          36.3 - 47.1 %   [facility]  
   
                                                    Hemoglobin.gastroin  
testinal spec 1 Ql   
(Stl)               12.7 g/dL                               11.9 - 15.1   
g/dL                                    [facility]  
   
                                                    Immature   
granulocytes/100   
WBC (Bld)       1 %             High            0               [facility]  
   
                                                    Interpretation and   
review of   
laboratory results Abnormal                                        [facility]  
   
                                                    Lymphocytes/100 WBC   
(Bld)           15 %            Low             24 - 43 %       [facility]  
   
                                                    MCH (RBC) [Entitic   
mass]               31.4 pg                                 25.2 - 33.5   
pg                                      [facility]  
   
                                                    MCHC (RBC)   
[Mass/Vol]          32.2 g/dL                               28.4 - 34.8   
g/dL                                    [facility]  
   
                                                    MCV (RBC) [Entitic   
vol]                97.3 fL                                 82.6 - 102.9   
fL                                      [facility]  
   
                                                    Monocytes/100 WBC   
(Bld)           9 %                             3 - 12 %        [facility]  
   
                          NRBC Automated 0.0                       0.0 per 100   
WBC                                     [facility]  
   
                                                    Platelet   
distribution width   
(Bld) [Ratio]   13.1 %                          11.8 - 14.4 %   [facility]  
   
                                                    Platelet mean   
volume (Bld)   
[Entitic vol]   10.1 fL                         8.1 - 13.5 fL   [facility]  
   
                                                    Platelets (Bld)   
[#/Vol]         411 10*3/uL                                     [facility]  
   
                          RBC (Bld) [#/Vol] 4.05 10*6/uL              3.95 - 5.1  
1   
m/uL                                    [facility]  
   
                                                    Segmented   
neutrophils/100 WBC   
(Bld)           75 %            High            36 - 65 %       [facility]  
   
                      Segs Absolute 11.43      High                  [facility]  
h  
   
                      WBC (Bld) [#/Vol] 15.2 10*3/uL [facility]  
  
  
  
Vital Signs  
  
  
                          Date Time    Vital Sign   Value        Performing   
Clinician                               Facility  
   
                                                    02-   
14:      Body height     158.75 cm       [facility]  
Work Phone:   
[phone]  
   
                                                    02-   
14:                              Body mass index (BMI)   
[Ratio]             37.08 kg/m2         [facility]  
Work Phone:   
[phone]  
   
                                                    02-   
14:                              Body surface area   
Derived from formula 2.03 m2             [facility]  
Work Phone:   
[phone]  
   
                                                    02-   
14:      Body weight     93.44 kg        [facility]  
Work Phone:   
[phone]  
   
                                                    02-   
14:                              Diastolic blood   
pressure            64 mm[Hg]           [facility]  
Work Phone:   
[phone]  
   
                                                    02-   
14:      Heart rate      80 /min         [facility]  
Work Phone:   
[phone]  
   
                                                    02-   
14:                              Systolic blood   
pressure            122 mm[Hg]          [facility]  
Work Phone:   
[phone]  
   
                                                    2024   
15:                              Blood Pressure   
Location                                            [facility]  
Work Phone:   
[phone]                           General Surgery   
[place]  
   
                                                    2024   
15:                              Diastolic blood   
pressure                  86 mm[Hg]                 [facility]  
Work Phone:   
[phone]                           General Surgery   
[place]  
   
                                                    2024   
15:          Heart rate          72 /min             [facility]  
Work Phone:   
[phone]                           General Surgery   
[place]  
   
                                                    2024   
15:          Respiratory rate    16 /min             [facility]  
Work Phone:   
[phone]                           General Surgery   
[place]  
   
                                                    2024   
15:                              Systolic blood   
pressure                  130 mm[Hg]                [facility]  
Work Phone:   
[phone]                           General Surgery   
[place]  
  
  
  
Encounters  
  
  
                          Encounter Date Encounter Type Care Provider Facility  
   
                          Start: 2024 ambulatory   [facility] Facility  
:[facility]  
   
                                Start: 02- Split Srvc      Krys Gonzalez  
rne  
Other Phone:   
[phone]                           [facility]  
   
                                                    Start: 2024  
End: 02-     ambulatory          Maki Veliz       Facility:[facility]  
   
                                                    Start: 2024  
End: 2024                         Patient encounter   
procedure                               [facility]  
Work Phone:   
[phone]                           General Surgery   
[facility] [place]  
Work Phone:   
[phone]  
   
                                                    Start: 2024  
End: 2024           ambulatory                MD Maki Veliz  
Work Phone:   
[phone]                          [facility]  
Work Phone:   
[phone]  
   
                                                    Start: 2024  
End: 2024           Departed Referred         MD Maki Veliz  
Work Phone:   
[phone]                          [facility]   
[facility]  
   
                                                    Start: 2024  
End: 2024     ambulatory          Maki Veliz         Facility: [place]  
   
                                                    Start: 2024  
End: 2024                         Patient encounter   
procedure                               [facility]  
Work Phone:   
[phone]                           General Surgery   
[facility] [place]  
Work Phone:   
[phone]  
   
                          Start: 2024 ambulatory   Oh JENELLE Facility:[facility]  
[facility]  
   
                                                    Start: 2023  
End: 2023     ambulatory          JOSE UNDERWOOD LEIGH  Not Available  
   
                                                    Start: 2023  
End: 2023           ambulatory                Delmis Vizcarra MD                           Facility:[facility]  
   
                                                    Start: 2023  
End: 2023     ambulatory          MAKI VELIZ         Facility:[facility]  
   
                                                    Start: 2023  
End: 2023     ambulatory          Jose Abraham Facility:[facility]  
   
                                                    Start: 2023  
End: 2023           ambulatory                NARENDRANATH   
[facility] .                          Facility:  
   
                                                    Start: 2023  
End: 2023           ambulatory                NARENDRANATH   
[place]ATHY .                          Facility:[facility]  
   
                                                    Start: 2023  
End: 2023           ambulatory                NARENDRANATH   
[place] .                          Facility:[facility]  
   
                                                    Start: 2023  
End: 2023           ambulatory                NARENDRANATH   
[place] .                          Facility:[facility]  
   
                                                    Start: 2023  
End: 2023     ambulatory          DR MAKI VELIZ .    Facility:[facility]  
   
                                                    Start: 2023  
End: 2023     ambulatory          DR MAKI VELIZ .    Facility:[facility]  
   
                                                    Start: 2023  
End: 2023     ambulatory          DR SAMEER HAGAN .  Facility:[facility]  
   
                                                    Start: 2023  
End: 2023     ambulatory          DR MAKI VELIZ .    Facility:[facility]  
   
                                                    Start: 2023  
End: 2023     ambulatory          DR MAKI VELIZ .    Facility:[facility]  
   
                                                    Start: 2023  
End: 2023     ambulatory          MR REGGIE OCAMPO .  Facility:[facility]  
   
                                                    Start: 2022  
End: 2022                         Subsequent hospital   
visit by physician        Christi Freire PTA        [facility]  
   
                                                    Start: 2022  
End: 2022     ambulatory          DR MAKI VELIZ .    Facility:[facility]  
   
                                                    Start: 12-  
End: 2022     ambulatory          DR MAKI VELIZ .    Facility:[facility]  
   
                                                    Start: 2022  
End: 2022     ambulatory          REGGIE OCAMPO       [facility]  
l  
   
                                                    Start: 2022  
End: 2022     ambulatory          MAKI VELIZ       [facility]  
l  
   
                                                    Start: 2022  
End: 2022                         Subsequent hospital   
visit by physician        Christi Freire PTA        [facility]  
   
                                        Comment on above:   Arrived   
   
                                                    Start: 2022  
End: 2022     ambulatory          MAKI Ritchie [facility]  
l  
   
                                                    Start: 2022  
End: 2022                         Subsequent hospital   
visit by physician        Patel Easley PT        [facility]  
   
                                        Comment on above:   Arrived   
   
                                                    Start: 10-  
End: 10-     ambulatory          DR SAMEER HAGAN .  Facility:[facility]  
   
                                                    Start: 2022  
End: 2022     ambulatory          DR SAMEER HAGAN .  Facility:[facility]  
   
                                                    Start: 2022  
End: 2022     ambulatory          DR SAMEER HAGAN .  Facility:[facility]  
   
                                                    Start: 2022  
End: 2022                         Subsequent hospital   
visit by physician                      [facility] Covid Screening   
Schedule                                [facility] Covid Screening  
   
                                        Comment on above:   Arrived   
   
                                                    Start: 2022  
End: 2022     ambulatory          MAKI Ritchie [facility]  
   
                                                    Start: 2022  
End: 2022     ambulatory          DR SAMEER HAGAN .  Facility:[facility]  
   
                          Start: 2022 ambulatory   DR SAMEER HAGAN . Faci  
lity:[facility]  
   
                                                    Start: 2022  
End: 2022     ambulatory          MAKI Ritchie [facility]  
   
                                                    Start: 2022  
End: 2022                         Subsequent hospital   
visit by physician                      [facility]   
Schedule                                [facility] Covid Screening  
   
                                        Comment on above:   Arrived   
   
                                                    Start: 2022  
End: 2022     ambulatory          DR SAMEER HAGAN .  Facility:[facility]  
   
                                                    Start: 2022  
End: 2022     ambulatory          DR SAMEER HAGAN .  Facility:[facility]  
   
                                                    Start: 2022  
End: 2022     ambulatory          DR SAMEER HAGAN .  Facility:[facility]  
   
                                                    Start: 2022  
End: 2022     ambulatory          MAKI Ritchie [facility]  
   
                                                    Start: 2022  
End: 2022                         Subsequent hospital   
visit by physician                      Maki Veliz MD  
Work Phone:   
[phone]                          [facility] LAB DOCTOR  
   
                                                    Start: 2022  
End: 2022     ambulatory          DR SAMEER HAGAN .  Facility:  
  
  
  
Procedures  
  
  
                          Date         Procedure    Procedure Detail Performing   
Clinician  
   
                                                    Start:   
02-                              Nerve conduction studies 5-6   
studies                                             Krys Pritchett  
   
                                                    Start:   
2022          Comprehensive metabolic panel                     Maki Veliz MD  
Work Phone:   
[phone]  
   
                                                    Start:   
2022          Lipid panel                             Maki Veliz MD  
Work Phone:   
[phone]  
   
                                                    Start:   
2015                              Microscopic observation   
[Identifier] in Cervix by Cyto   
stain                                               Maki Veliz MD  
Work Phone:   
[phone]  
   
                                       Arthroplasty of left shoulder              
  Oh SALMERON  
Work Phone:   
[phone]  
   
                                       Bladder excision              Oh NIL  
L  
Work Phone:   
[phone]  
   
                                                    Comment on   
above:                                  back   
   
                                       Cholecystectomy              Oh SALMERON  
Work Phone:   
[phone]  
   
                                       Colonoscopy               Oh GEORGESL  
Work Phone:   
[phone]  
   
                                       Esophagogastroduodenoscopy              ОЛЕГ SALMERON  
Work Phone:   
[phone]  
   
                                       Excision of cyst              Oh ALANIZ  
Work Phone:   
[phone]  
   
                                                    Comment on   
above:                                  back   
   
                                       Ostectomy of calcaneus for spur            
    Oh SALMERON  
Work Phone:   
[phone]  
   
                                                            Radiofrequency ablat  
ion of nerve   
root of lumbar spine using   
fluoroscopic guidance                               Oh SALMERON  
Work Phone:   
[phone]  
   
                                       Repair of joint of left hip                
Oh SALMERON  
Work Phone:   
[phone]  
   
                                       Repair of joint of right hip               
 Oh SALMERON  
Work Phone:   
[phone]  
   
                                                            Repair of musculoten  
dinous cuff of   
shoulder                                            Oh SALMERON  
Work Phone:   
[phone]  
  
  
  
Plan of Treatment  
  
  
                          Date         Care Activity Detail       Author  
   
                          Start: 2027 Lipid panel  Lipids       [facility]  
   
                          Start: 2023 ambulatory   Ambulatory   Facility:H  
1  
   
                                                    Start: 2022  
End: 2022           Patient encounter procedure 2022 Appointment   
Physical Therapy   
Christi Freire PTA [facility]   
[facility]rapy  
   
                                                    Start: 2022  
End: 2022           Patient encounter procedure 2022 Appointment   
Physical Therapy   
Christi Freire PTA [facility]   
Therapy  
   
                                                    Start: 2022  
End: 2022           Patient encounter procedure 2022 Appointment   
Physical Therapy   
Christi Freire PTA [facility]   
Therapy  
   
                                                    Start: 2022  
End: 2022           Patient encounter procedure 2022 Appointment   
Physical Therapy   
Christi Freire PTA [facility]   
Therapy  
   
                                                    Start: 2022  
End: 2022           Patient encounter procedure 2022 Appointment   
Physical Therapy   
Christi Freire PTA [facility]   
Therapy  
   
                                        Start: 2022   Pneumococcal 65+ yea  
rs   
Vaccine (1 - PCV)                       Pneumococcal 65+ years   
Vaccine (1 - PCV)                       [facility]  
   
                          Start: 2022 Influenza vaccination              [facility]  
   
                                Start: 2022 Hemoglobin A1c measurement A1C  
 test (Diabetic or   
Prediabetic)                            [facility]  
   
                          Start: 2022 Influenza vaccination Flu vaccine (#  
1) [facility]  
   
                                        Start: 2018   Screening for malign  
ant   
neoplasm of cervix                                  [facility]  
   
                          Start: 2015 Creatinine measurement Creatinine     
[facility]  
   
                                        Start: 2015   Potassium [Moles/vol  
ume] in   
Serum or Plasma           Potassium                 [facility]  
   
                                Start: 2012 Screening for osteoporosis DEX  
A (modify frequency   
per FRAX score)                         [facility]  
   
                                        Start: 2007   Screening for malign  
ant   
neoplasm of breast        Breast cancer screen      [facility]  
   
                                Start: 2007 Shingles vaccine (1 of 2) Shin  
gles vaccine (1 of   
2)                                      [facility]  
   
                                        Start: 2002   Screening for malign  
ant   
neoplasm of colon                                   [facility]  
   
                          Start: 1997 Lipid panel  Lipids       [facility]  
   
                                        Start: 1987   Screening for malign  
ant   
neoplasm of cervix                      HPV (without or with   
Pap)                                    [facility]  
   
                                        Start: 1976   DTaP/Tdap/Td vaccine  
 (1 -   
Tdap)                                   DTaP/Tdap/Td vaccine   
(1 - Tdap)                              [facility]  
   
                          Start: 1975 Hepatitis C screening Hepatitis C sc  
reen [facility]  
   
                          Start: 1972 HIV screening HIV screen   [facility]  
   
                          Start: 1969 Depression Screen Depression Screen   
[facility]  
   
                          Start: 1962 COVID-19 Vaccine (1) COVID-19 Vaccin  
e (1) [facility]  
   
                          Start: 1958 COVID-19 Vaccine (#1) COVID-19 Vacci  
ne (#1) [facility]   
[employer]  
   
                                                      
End: 2022     COVID-19                                [facility]  
Work Phone:   
[phone]  
   
                                        Comment on above:   Once for 1 Occurrenc  
es starting 2022 until 2022   
   
                                                      
End: 2022     COVID-19                                [facility]  
Work Phone:   
[phone]  
   
                                        Comment on above:   Once for 1 Occurrenc  
es starting 2022 until 2022   
   
                                                      
End: 2022                         Hemoglobin   
A1c/Hemoglobin.total in   
Blood                                               [facility] [employer]  
Work Phone:   
[phone]  
   
                                        Comment on above:   Once for 1 Occurrenc  
es starting 2022 until 2022   
   
                                                      
End: 2022     T4                                      [employer]  
Work Phone:   
[phone]  
   
                                        Comment on above:   Once for 1 Occurrenc  
es starting 2022 until 2022   
   
                                                      
End: 2022                         Triiodothyronine (T3) Free   
[Mass/volume] in Serum or   
Plasma                                              [employer] Phone:   
[phone]  
   
                                        Comment on above:   Once for 1 Occurrenc  
es starting 2022 until 2022   
   
                                                      
End: 2022     Vitamin D 25 Hydroxy                     [employer] Phone:   
[phone]  
   
                                        Comment on above:   Once for 1 Occurrenc  
es starting 2022 until 2022   
  
  
  
Immunizations  
  
  
                      Immunization Date Immunization Notes      Care Provider Fa  
denzel  
   
                                                    NEGATED: Highlighted   
row has not   
occurred!2024                     influenza virus   
vaccine, unspecified   
formulation                                         Oh GEORGESCORBIN  
Work Phone:   
[phone]                           General Surgery   
[place]  
  
  
  
Payers  
  
  
                          Date         Payer Category Payer        Policy ID  
   
                          2024   Self-pay                    
   
                          2023   Unknown                     
   
                          2022   Medicare                    
   
                          2015   Unknown                   [id number] .2.84  
[id number]  
   
                          1960   Medicare                  [id number]  
   
                          1960   Unknown                   [id number]  
   
                          1960   Unknown                   [id number]  
   
                          1957   Unknown                   [id number] 2.16.  
[id number]  
   
                          1957   Unknown                   [id number] 2.16.8  
[id number]  
   
                          1957   Unknown                   [id number] 2.16.8  
[id number]  
   
                          1957   Unknown                   [id number] 2.16.8  
[id number]  
   
                          1957   Unknown                   [id number] 2.16.8  
[id number]  
   
                          1957   Unknown                   [id number] 2.16.8  
[id number]  
   
                          1957   Unknown                   [id number] 2.16.84  
[id number]  
   
                          1957   Unknown                   [id number] 2.16.84  
[id number]  
   
                          1957   Unknown                   [id number] 2.16.84  
[id number]  
   
                          1957   Unknown                   [id number] 2.16.84  
[id number]  
   
                          1957   Unknown                   [id number] 2.16.84  
[id number]  
   
                          1957   Unknown                   [id number] 2.16.84  
[id number]  
   
                          1957   Unknown                   [id number] 2.16.84  
[id number]  
   
                          1957   Unknown                   [id number] 2.16.84  
[id number]  
   
                          1957   Unknown                   [id number] 2.16.84  
[id number]  
   
                          1957   Unknown                   [id number] 2.16.84  
[id number]  
   
                          1957   Unknown                   [id number] 2.16.84  
[id number]  
   
                          1957   Unknown                   [id number] 2.16.84  
[id number]  
   
                          1957   Unknown                   [id number] 2.16.84  
[id number]  
   
                          1957   Unknown                   [id number] 2.16.84  
[id number]  
   
                          1957   Unknown                   [id number] 2.16.84  
[id number]  
   
                          1957   Unknown                   [id number] 2.16.84  
[id number]  
   
                          1957   Unknown                   [id number] 2.16.84  
[id number]  
   
                          1957   Unknown                   [id number] 2.16.84  
[id number]  
   
                          1957   Unknown                   [id number] 2.16.84  
[id number]  
   
                          1957   Unknown                   [id number] 2.16.84  
[id number]  
   
                          1957   Unknown                   [id number] 2.16.84  
[id number]  
   
                          1957   Unknown                   [id number] 2.16.84  
[id number]  
   
                          1957   Unknown                   [id number] 2.16.84  
[id number]  
   
                          1957   Unknown                   [id number] 2.16.8  
[id number]  
   
                          1957   Unknown                   [id number] 2.16.8  
[id number]  
   
                          1957   Unknown                   [id number] 2.16.  
[id number]  
   
                          1957   Unknown                   [id number] 2.16.840  
.[id number]  
   
                          1957   Unknown                   [id number] 2.16.8  
[id number]  
   
                          1957   Unknown                   [id number] 2.16.8  
[id number]  
   
                          1957   Unknown                   [id number] 2.16.8  
[id number]  
   
                                       Unknown                   [id number]  
   
                                       Unknown                   [id number] 2.16.  
[id number]  
  
  
  
Social History  
  
  
                          Date         Type         Detail       Facility  
   
                                                    Start: 2012  
End: 2024                         Tobacco smoking status   
NHIS                      Ex-smoker                 [employer]  
   
                                                      
End: 2012     History of tobacco use Current smoker      [employer] Phone:   
[phone]  
   
                                                    Start: 2012  
End: 2018                         Tobacco use and   
exposure                                Smokeless tobacco   
non-user                                [employer] Phone:   
[phone]  
   
                                Start: 2018 Alcohol intake  Current drinke  
r of   
alcohol (finding)                       [employer]  
Work Phone:   
[phone]  
   
                          Start: 1955 Sex Assigned At Birth Not on file  M  
[employer] Phone:   
[phone]  
   
                                                      
End: 2012     History of tobacco use Cigarette Smoker    [employer]  
Work Phone:   
[phone]  
   
                                       Tobacco smoking status Never        Gener  
al Surgery   
[place]  
   
                                       Sex Assigned At Birth Female       [facility]  
   
                          Start: 1900              *Tobacco     [employer]  
Work Phone:   
[phone]  
   
                          Start: 1955 Sex Assigned At Birth Female       F  
[facility]  
  
  
  
Functional Status  
  
  
                          Date         Assessment   Result       Facility  
   
                          2024   Functional Status N/A          [facility]  
[facility] [place]  
  
  
  
Clinical Notes 2022 to 2024  
  Christi Freire, PTA - 2022 3:45 PM ESTSdamian Easley, PT - 2022   
1:30 PM EST  
  
                                Note Date & Type Note            Facility  
   
                                        2024 Note     Chief Complaint  
consultation for nevus  
HPI Staff  
68 year old female presents on   
consultation from Dr. Veliz for   
nevus. Reports solid nodule to   
right neck for approximate;y one   
year. Denies change since first   
noted. Denies this being sore or   
tender. Never been inflamed or   
drained.  
History of Present Illness  
69 yo female with h/o DMII, htn,   
combined systolic/diastolic heart   
failure, hyperlipidemia, GERD,   
hypothyroidism, lumbar stenosis,   
referred for skin lesion on neck;   
patient reports 1 year h/o   
swelling right neck, slight   
increase in size, no pain or   
drainage; no h/o other similar   
lesions; on baby asa daily, no   
NSAIDs; no tobacco use.  
Review of Systems  
PHQ Score  
Initial Depression Screen Score:   
0 SCORE  
ROS - Provider  
Constitutional: no fever, no   
sweats, no weight loss.  
Eyes: no glasses, no blurred   
vision, no visual loss.  
ENMT: no dentures, no hoarseness,   
no swallowing difficulties, no   
hearing loss, no ear   
infection(s), no nose bleeds.  
Cardiovascular: normal blood   
pressure, no chest pain, regular   
heartbeat, no heart murmur.  
Respiratory: no shortness of   
breath, no cough, no asthma, no   
wheezing.  
Gastrointestinal: no nausea, no   
vomiting, no diarrhea, no   
constipation, no blood in stool,   
no change in bowel habits, no   
abdominal pain, no hepatitis.  
Genitourinary: no kidney stones,   
no urine infection, no dysuria.  
Musculoskeletal: no pain, no   
weakness.  
Skin: no changing moles, no rash,   
yes skin lumps.  
Neurologic: no seizures, no   
epilepsy, no headache.  
Psychiatric: no emotional or   
psychiatric problem.  
Heme/Lymph: no bleeding problems,   
no anemia, no blood clots, no   
transfusions.  
Allergy/Immunologic: no swollen   
lymph nodes/glands, no IV drug   
abuse.  
Other:  
Additional ROS info: Except as   
noted in the above Review of   
Systems and in the History of   
Present Illness, all other   
systems have been reviewed and   
are negative or noncontributory.  
  
Physical Exam  
Vitals & Measurements  
HR: 72(Peripheral) RR: 16 BP:   
130/86  
HT: 64 in HT: 162.5 cm WT: 92.8   
kg WT: 204.16 lb BMI: 35.14  
skin: 1 cm epidermal cyst right   
neck, no erythema or drainage,   
nontender.  
Assessment/Plan  
1. Epidermal cyst of neck (L72.0:   
Epidermal cyst)  
plan excisional biopsy under   
local anesthesia in the office,   
informed consent obtained.  
Follow-up  
No qualifying data available  
Problem List/Past Medical History  
Ongoing  
Acute combined systolic and   
diastolic heart failure..  
Anxiety  
BMI 35.0-35.9,adult  
Diabetes mellitus  
Epidermal cyst of neck  
Gastroesophageal reflux disease  
Hypercholesterolemia  
Hyperlipidemia  
Hypertensive disorder  
Hypothyroidism  
Lumbar spinal stenosis  
Morbid obesity  
Obese  
Historical  
No qualifying data  
Procedure/Surgical History  
Arthroplasty of left hip,   
Arthroplasty of left shoulder,   
Arthroplasty of right hip,   
Cholecystectomy, Colonoscopy,   
Cystectomy, EGD -   
esophagogastroduodenoscopy,   
Excision of calcaneal spur,   
Excision of cyst, Radiofrequency   
ablation of nerve root of lumbar   
spine using fluoroscopic   
guidance, Rotator cuff repair.  
Medications  
Acidophilus Probiotic Blend  
aspirin 81 mg Oral EC Tab, 81 mg=   
1 tab(s), Oral, Daily  
Cranberry  
Cytomel 5 mcg Tab, 5 mcg= 1   
tab(s), Oral, Daily  
duloxetine 30 mg Cap-DR, 1   
cap(s), Oral, BID  
Fish Oil 1200 mg oral capsule,   
1200 mg= 1 cap(s), Oral, BID  
hydrochlorothiazide 25 mg Tab, 25   
mg= 1 tab(s), Oral, Daily  
losartan 25 mg Tab, 25 mg= 1   
tab(s), Oral, Daily  
metformin 500 mg Tab, 500 mg= 1   
tab(s), Oral, BID  
metoprolol tartrate 100 mg Tab,   
100 mg= 1 tab(s), Oral, BID  
Pantoprazole 40 mg DR Tab, 40 mg=   
1 tab(s), Oral, Daily  
simvastatin 20 mg Tab, 20 mg= 1   
tab(s), Oral, qPM  
Vitamin D3 2000 intl units oral   
Tab, 2 tab(s), Oral, Daily  
Allergies  
Latex (Skin rash)  
Nickel (Skin rash)  
Percocet (Itching)  
Vicodin (Itching)  
penicillin (Rash)  
Social History  
Alcohol - Denies Alcohol Use,   
2024  
Substance Abuse - Denies   
Substance Abuse, 2024  
Tobacco  
Former smoker, quit more than 30   
days ago Tobacco Use:. Never   
Smokeless Tobacco Use:.   
Cigarettes, 0.25 per day. Started   
age 17.0 Years. Stopped age 54   
Years., 2024  
Family History  
Dementia: Mother.  
Immunizations  
Vaccine Date Status Comments  
influenza virus vaccine,   
inactivated - Not Given Patient   
Refuses                                 [facility]  
   
                                        Comment on above:   Result Comment: Elec  
tronically Signed By: JENELLE FAJARDO,   
Oh Le\Date and Time Signed: 24 16:02 EST   
   
                                        2023 Note     [id number]  
[id number]  
[id number]  
   
                                        2023 Note     Education Materials  
DR. MANDEL POST OPERATIVE   
SHOULDER INSTRUCTIONS  
SURGEONS WRITTEN INSTRUTCTIONS:  
1. If you have been given a cryo   
cuff after surgery you should use   
it as much as possible for the   
first 24-48 hours. After that it   
is optional. TIP: Many patients   
prefer to use it a little longer   
because it helps reduce pain  
2. You should wiggle your fingers   
frequently  
3. Change your dressings in 1   
day. If steri-strips have been   
applied DO NOT remove them. When   
the wound is clean and dry you   
may leave it open to air but   
again DO NOT remove any   
steri-strips that have been   
applied  
4. You may shower in 1 day but do   
not let the water stream directly   
strike the wound  
5. Do pendulum exercises for at   
least 10 minutes twice a day  
6. If you have any problems or   
concerns, please call the office   
at [phone]  
7. Follow up as scheduled               [facility]  
   
                                        2023 Note     [facility]  
Clinical Discharge Summary  
PERSON INFORMATION  
Name SHERRIE LOZADA Age 67   
Years  1955  
Sex FEMALE Language English PCP   
MAKI VELIZ  
Marital Status Single Phone Med   
Service Observation  
MRN [id number] Acct# Arrival   
2023 08:33:12  
Visit Reason SURGERY - LEFT   
REVERSE TOTAL SHOULDER Acuity LOS   
000 26:35  
Address:  
[address] [place] [address]  
Comment:  
PROVIDER INFORMATION  
VITALS INFORMATION  
Vital Sign Triage Latest  
Temp Oral 35.9 DegC 36.8 DegC  
Temp Temporal  
Temp Intravascular  
Temp Axillary  
Temp Rectal  
02 Sat 100 % 94 %  
Respiratory Rate 16 br/min 18   
br/min  
Peripheral Pulse Rate 56 bpm 70   
bpm  
Apical Heart Rate  
Blood Pressure 138 mmHg / 95 mmHg   
106 mmHg / 70 mmHg  
Comment:  
MEDICAL INFORMATION  
Allergy Info:  
Nickel; Latex; Vicodin; Percocet;   
penicillin  
Medication List:  
Medications That Were Updated -   
Follow Below Instructions  
Other Medications  
Updated: cranberry (Cranberry   
oral capsule) 1 tab(s) Oral 2   
times a day.  
Medications to Continue That Have   
Not Changed  
Other Medications  
ascorbic acid (Vitamin C 1000 mg   
oral tablet) 3 tab(s) Oral every   
day.  
aspirin (aspirin 81 mg oral   
delayed release tablet) 1 tab(s)   
Oral every day.  
baclofen (baclofen 10 mg oral   
tablet) 1 tab(s) Oral every day.  
biotin (biotin 5000 mcg oral   
capsule) 1 cap(s) Oral every day.  
cholecalciferol (Vitamin D3 1000   
intl units oral tablet) 1 tab(s)   
Oral 2 times a day.  
garlic (Garlic Oil oral capsule)   
1 cap(s) Oral 2 times a day.  
hydroCHLOROthiazide   
(hydroCHLOROthiazide 25 mg oral   
tablet) 1 tab(s) Oral every day.  
liothyronine (liothyronine 5 mcg   
oral tablet) 1 tab(s) Oral every   
day.  
losartan (losartan 25 mg oral   
tablet) 1 tab(s) Oral every day.  
magnesium gluconate (magnesium   
gluconate 250 mg oral tablet) 1   
tab(s) Oral every day.  
metFORMIN (metFORMIN 500 mg oral   
tablet) 1 tab(s) Oral 2 times a   
day.  
metoprolol (metoprolol tartrate   
25 mg oral tablet) 1 tab(s) Oral   
2 times a day.  
omega-3 polyunsaturated fatty   
acids (Fish Oil 1000 mg oral   
capsule) 1 cap(s) Oral 2 times a   
day.  
pantoprazole (pantoprazole 40 mg   
oral delayed release tablet) 1   
tab(s) Oral every day.  
simvastatin (simvastatin 20 mg   
oral tablet) 1 tab(s) Oral every   
day.  
Template Non-Formulary (beet   
root) Oral every day.  
Template Non-Formulary (veggie   
and fruit tablet) Oral 2 times a   
day.  
traMADol (traMADol 50 mg oral   
tablet) 1 tab(s) Oral Every 12   
hours scheduled time as needed as   
needed for pain.  
zinc gluconate (zinc (as   
gluconate) 50 mg oral tablet) 1   
tab(s) Oral every day.  
Comment:  
Lab and Radiology Results  
Laboratory or Other Results This   
Visit (last charted value for   
your 2023 visit)  
Chemistry  
2023 1:31 PM  
Glucose, POC: 108 mg/dL -- Normal   
range between ( 74 and 118 )  
Diagnostic Radiology  
2023 1:47 PM  
XR Shoulder 1 View Left: XR   
Shoulder 1 View Left  
Radiology Report  
2023 1:47 PM  
Radiology Report: Radiology   
Report  
DIET & ACTIVITY  
Patient Activity Level:  
Patient Diet:  
Regular  
Patient Activity Restrictions:  
DISCHARGE INFORMATION  
Discharge Disposition:  
Discharge Location:  
DEPART REASON INCOMPLETE   
INFORMATION  
PATIENT EDUCATION INFORMATION  
Instructions:  
Leigh- Post Op Shoulder   
(CUSTOM)  
Follow up:  
With: Address: When:  
Jose Abraham [address] [place] [address], [address] [place] [address]  
[phone] [place] [facility] (1)   
2023 10:45 AM  
DIAGNOSIS  
Arthritis of left glenohumeral   
joint  
Comment:  
PHYS DOC NOTES                          [facility]  
   
                                        2023 Note     [id number]  
[id number]  
[id number]                   [facility]  
   
                                        2023 Note     CLINICAL HISTORY: Po  
stoperative   
evaluation left shoulder   
arthroplasty  
COMPARISON: None available  
TECHNIQUE: Frontal view of the   
left shoulder.  
FINDINGS:  
Postsurgical changes of total   
reverse shoulder arthroplasty.   
Alignment appears anatomic on   
this single frontal view. No   
periprosthetic abnormality. Mild   
degenerative changes of the   
acromioclavicular joint.  
IMPRESSION:  
Postsurgical changes of left   
shoulder arthroplasty.  
***** Final *****  
Signed (Electronic Signature):   
Miguel Petit DO 23 4:32   
pm  
Technologist: YONG MARCUS                     [facility]  
   
                                        Comment on above:   Order Comment: erasmo munoz status post shoulder replacement   
   
                                        2023 Note     CONSULTATION  
CONSULTATION DATE: 2023  
TO: Maki Veliz M.D.  
CHIEF COMPLAINT: Includes severe   
bilateral hip pain, buttock pain,   
worse on  
the right than left side.  
HISTORY: She describes it at   
5-7/10 pain, burning in   
character, sensitive to  
light touch, also seems to   
increase with activity such as   
standing, walking and  
performing transitioning   
maneuvers. She feels most   
comfortable in the  
semi-recumbent position. Denies   
any change in bowel and bladder   
habits or new  
sensorimotor changes in the lower   
extremities  
EXAM: Notable for patient having   
dysesthesia and hyperesthesia   
overlying the  
distribution of the lateral   
cutaneous branch of the   
iliohypogastric nerve  
bilaterally, more significant on   
the right than the left side.  
IMPRESSION: Our impression is   
patient with chronic pain   
secondary to neuritis  
involving the lateral cutaneous   
branch of the iliohypogastric   
nerve bilaterally,  
worse on the right than the left   
side. She has undergone a   
diagnostic  
injection bilaterally, of the   
same nerve, under fluoroscopic   
guidance. She  
reports the pain symptoms were   
improved by 100% starting in the   
immediate post  
procedural period, lasting for   
several hours, with recurrence of   
pain back to  
her baseline.  
RECOMMENDATIONS: I recommend   
proceeding with a rhizotomy using   
radiofrequency  
ablation of the lateral cutaneous   
branch of the iliohypogastric   
nerve, starting  
on the right side initially and   
then proceeding to the left side,   
approximately  
1-2 weeks lateral. I have asked   
her to trial Zonegran 50 mg at   
h.s. and  
re-trial Zanaflex one week after   
starting her Zonegran.  
As part of providing excellent,   
safe, comprehensive care, the   
following was  
completed at our patient's visit:  
1. A medication reconciliation   
and review to ensure accurate   
knowledge of  
current/active medications,   
including asking our patients to   
inform us about  
any over-the-counter medications   
or herbal remedies/nutritional  
supplements/alternative remedies.  
2. A review to specifically   
ensure our patients have had   
annual screening for:  
elevated body mass index (BMI,   
see intake chart for exact   
total), tobacco use,  
screening for depression, and   
screening for unhealthy alcohol   
use. When  
screening is concerning, patients   
are provided with education and   
the specific  
recommendation to discuss the   
concerning health issue and   
treatment options  
with their primary care provider.       [facility]  
   
                                        2023 Note     CONSULTATION  
CONSULTATION DATE: 2023  
TO: Dr. Maki Veliz and Dr. Sherrie Osman  
CHIEF COMPLAINT: Includes severe   
bilateral lower back pain.  
HISTORY OF PRESENT ILLNESS: She   
presented today complaining of   
5-7/10 pain in  
her lower back, occurring   
bilaterally, describes it as   
sharp pain, increased  
with activities such as standing,   
walking and performing   
transitioning  
maneuvers. She feels most   
comfortable in the semi-recumbent   
position. Denies  
any change in bowel and bladder   
habits or new sensorimotor   
changes in the lower  
extremities.  
EXAM: Notable for the patient   
having a non-focal sensorimotor   
exam of the  
lower extremities. DTRs were   
symmetrical. She has a negative   
straight leg  
raise. She had no clinical   
myelopathy involving the lower   
extremities. The  
patient did have tenderness along   
the SI joints bilaterally, as   
well as  
positive bilateral Gaenslen's   
maneuver, pelvic compression test   
and Billy's  
test. Patient had significant   
myofascial dysfunction along the   
lumbar  
paravertebral muscles occurring   
bilaterally. She also had   
significant  
dysesthesias and hypoesthesia   
long the distribution of the   
lateral cutaneous  
and hypogastric nerve, also   
occurring bilaterally.  
IMPRESSION: Patient with chronic   
pain secondary to SI joint   
dysfunction.  
RECOMMENDATIONS: I recommended   
bilateral SI joint injection   
under fluoroscopy  
guidance. I have actually   
discontinued Flexeril secondary   
to ineffectiveness.  
Will trial her on Zanaflex 4 mg   
pill, half a pill to one pill up   
to b.i.d. as  
tolerated. I have asked her to   
discontinue her Xanax and have   
also recommended  
Narcan spray available. We did   
discuss these benzodiazepines and   
offered  
analgesics with the patient. All   
her questions answered. She   
agrees to  
proceed with the outlined plan.         [facility]  
   
                                        2023 Note     CONSULTATION  
CONSULTATION DATE: 2023  
HISTORY OF PRESENT ILLNESS: This   
is a 67-year-old female who   
returns to the  
clinic for a three month follow   
up. She was last seen at the end   
of October  
and had pain 8/10. At that time,   
she received bilateral trapezius   
and trigger  
point injections, which she   
reports next to no relief. The   
patient states her  
pain is 8/10 today. She has   
bilateral anterior shoulder pain,   
which she was  
told by her PCP is tendonitis,   
and diffuse lower back pain. She   
did go to Dr. Sherwood's office and they   
proceeded to do lumbar imaging,   
lumbar MRI which  
does show significant disc   
herniation between L5 and S1. The   
patient does have  
a Neurosurgery appointment   
pending with Dr. Sherrie Osman   
in [place]  
Neurosurgery. She also feels she   
is having spasms in her back. She   
can walk  
for short periods of time, but   
then she needs to sit down. She   
did have  
radiofrequency ablations in   
August of this year which   
afforded her minimal to  
no relief. Current medication   
include diclofenac 75 mg b.i.d.,   
tramadol 50 mg  
b.i.d. p.r.n., baclofen 10 mg,   
which she stopped taking due to   
being  
ineffective, and a vitamin   
regimen. She has recently been   
placed on metformin  
and has had approximately a 20   
pound weight loss. The patient is   
very  
discouraged with this pain at   
this time. We had discussed, in   
the past,  
increasing different pain   
medications, but she does have an   
allergy to  
hydrocodone and oxycodone.   
Patient does feel better when she   
bends forward or  
when she is sitting down.   
Standing and walking and doing   
housework are the  
most painful activities for her.  
Patient's REVIEW OF SYSTEMS /   
PAST MEDICAL HISTORY / ALLERGIES   
and IMAGES have  
been reviewed and noted on the   
chart.  
PHYSICAL EXAM:  
VITAL SIGNS: Blood pressure   
105/70, heart rate is 59.   
Temperature is 96.9.  
She is 5'4 , weighs 108 kg.  
GENERAL IMPRESSION: Pleasant,   
appropriate, obvious discomfort   
sitting in the  
chair.  
FOCUSED EXAM - BACK: Range of   
motion is guarded in lateral   
rotation and  
flexion/extension. Paravertebral   
muscles are taut but   
non-spasmodic.  
Tenderness is reproduced upon   
compression along the lower   
lumbar facets of L3,  
L4, L5 bilaterally. Josse's   
point is non-tender at this time.  
MUSCULOSKELETAL: Bilateral upper   
extremities motor is 4/5   
bilaterally. Range  
of motion of bilateral shoulders   
is guarded in lateral raises and   
adduction.  
Point tenderness along bilateral   
bicipital head tendon sheath and   
compression  
reproduces the patient's pain   
pattern. Lower extremities   
bilaterally motor is  
4/5. Patient walks unassisted   
with a stable gait.  
NEUROLOGICAL: Radicular sensory   
is intact. Negative   
polyneuropathy.  
DIAGNOSIS: Bilateral bicipital   
head tendon sheath tendonitis,   
lumbar  
degenerative disc disease, lumbar   
disc displacement and lumbar   
spondylosis.  
PLAN: Patient will receive   
bilateral bicipital tendon sheath   
injections in the  
office today, which she does   
consent to. We will increase her   
tramadol to 100  
mg b.i.d. We will also start her   
on Flexeril 10 mg q.h.s. and I   
encouraged her  
to use a menthol heat rub with   
heat application. We will see her   
in three  
months' time, unless otherwise   
indicated. We did ask that she   
call the office  
with an update after her   
Neurosurgery appointment in early   
February. Patient  
agrees with this plan.                  [facility]  
   
                                        2023 Note     CONSULTATION  
PROCEDURE DATE: 2023  
PREOPERATIVE DIAGNOSIS: Bilateral   
bicipital head sheath tendonitis.  
POSTOPERATIVE DIAGNOSIS:   
Bilateral bicipital head sheath   
tendonitis.  
PROCEDURE: Bilateral bicipital   
head tendon sheath injection.  
Subsequent to obtaining informed   
consent, the patient was placed   
in the upright  
sitting position. Alcohol prep   
was used to sterilize each   
location. A 25  
gauge needle with 0.125% Marcaine   
and 40 mg of Kenalog was placed   
was divided  
into two locations. The needle   
was placed to rest inside each   
bicipital head  
tendon. Negative heme. Medication   
was injected in a slow pattern.   
Patient  
tolerated the procedure well and   
will be followed up in the   
clinic.                                 [facility]  
   
                                                    2022 History of   
Present illness Narrative               Formatting of this note might be   
different from the original.  
[facility]  
Inpatient/Observation/Outpatient   
Rehabilitation  
  
Date: 2022  
Patient Name: Sherrie Lozada []   
Inpatient Acute/Observation [x]   
Outpatient  
: 1955  
  
[x] Pt cancelled due to: [] No   
Reason Given [] Sick/ill [x]   
Other:  
No reason given to    
for today's cancelled visit,   
however PTA did speak with   
patient earlier in the week and   
she stated she had some medical   
issues going on that she is   
seeking a doctor's advice for.  
  
Therapist/Assistant will attempt   
to see this patient, at our   
earliest opportunity.  
  
Christi Freire PTA [id number] Date:   
2022  
  
Electronically signed by Patel Easley PT at 2022 10:23 AM   
EST  
documented in this encounter            [facility] [employer]  
Work Phone:   
[phone]  
   
                                                    2022 History of   
Present illness Narrative               Formatting of this note is   
different from the original.  
Phone: [phone] [facility]  
Fax: [phone] Outpatient   
Physical Therapy  
Evaluation  
Date: 2022  
Patient: Sherrie Lozada  
: 1955  
CSN #: [id number]  
  
Referring Physician: Reggie Ocampo PA-C  
  
Medical Diagnosis: R hip pain,   
M25.551  
Treatment Diagnosis: Low back and   
R hip pain  
PT Insurance Information: [employer]  
Total # of Visits Approved: 12  
Total # of Visits to Date: 1  
No Show: 0  
Canceled Appointment: 0  
  
Subjective  
Subjective: Patient reports hx of   
back pain for years and R hip   
pain started recently. Had xrays   
for R hip on Friday that showed   
trochanteric bursitis and liner   
of OLGA LIDIA is wearing down. MRI   
pending insurance approval. /10   
pain after work and being on her   
feet all day. Heat packs and cold   
packs that provide temporary   
relief but then it comes right   
back. Pain medications don't   
help.  
Additional Pertinent Hx: Hx of B   
OLGA LIDIA's, arthritis, HTN, anxiety  
  
Observations:  
General Observations  
Description: Pt stands with   
lumbar hyperlordosis and B   
anterior pelvic tilt, Moderate   
TTP L5/S1, R PSIS, R greater   
trochanter; (-) B slump, SLR, (-)   
B MICKIE, good pelvic alignment,   
relief with manual lumbar   
traction with therapy ball  
  
Objective  
  
Strength  
  
Strength LLE  
L Hip Flexion: 4-/5  
L Hip ABduction: 4/5  
L Hip ADduction: 4/5  
L Knee Flexion: 4/5  
L Knee Extension: 4-/5  
L Ankle Dorsiflexion: 4/5  
  
Lumbar Assessment  
AROM Lumbar Spine  
Lumbar spine general AROM:   
flexion: 6 in from floor, no   
pain; B SB: to knees, no pain  
  
Special Tests:  
Strength RLE  
R Hip Flexion: 3/5  
R Hip ABduction: 4/5  
R Hip ADduction: 4/5  
R Knee Flexion: 4/5  
R Knee Extension: 4-/5  
R Ankle Dorsiflexion: 4-/5  
Strength LLE  
L Hip Flexion: 4-/5  
L Hip ABduction: 4/5  
L Hip ADduction: 4/5  
L Knee Flexion: 4/5  
L Knee Extension: 4-/5  
L Ankle Dorsiflexion: 4/5  
  
Exercises:  
Exercise 1: HEP: PPT, marching,   
glut sets, LTR  
  
Manual:  
Manual Traction: Gentle manual   
lumbar traction with therapy ball   
with relief of pain noted  
  
  
Functional Outcome Measures  
Get out of bed: Somewhat   
difficult  
Sleep through the night: Somewhat   
difficult  
Turn over in bed: Somewhat   
difficult  
Ride in a car: Minimally   
difficult  
Stand up for 20-30 minutes:   
Fairly difficult  
Sit in a chair for several hours:   
Somewhat difficult  
Climb one flight of stairs:   
Fairly difficult  
Walk a few blocks (300-400 m):   
Fairly difficult  
Walk several kilometers - miles:   
Unable to do  
Reach up to high shelves:   
Minimally difficult  
Throw a ball: Unable to do  
Run one block (about 100 m):   
Unable to do  
Take food out of the   
refrigerator: Somewhat difficult  
Make your bed: Fairly difficult  
Put on socks (pantyhose):   
Somewhat difficult  
Bend over to clean the bathtub:   
Very difficult  
Move a chair: Somewhat difficult  
Pull or push heavy doors:   
Somewhat difficult  
Carry two bags of groceries:   
Somewhat difficult  
Lift and carry a heavy suitcase:   
Very difficult  
[place] Total Score: 55  
  
Assessment  
Assessment: Patient is 67 year   
old female with dx of R hip pain   
who presents with lower back and   
R hip pain 8/10 at worst after   
working all day on her feet.Pt   
stands with lumbar hyperlordosis   
and B anterior pelvic tilt,   
Moderate TTP L5/S1, R PSIS, R   
greater trochanter; (-) B slump,   
SLR, (-) B MICKIE, good pelvic   
alignment, relief with manual   
lumbar traction with therapy   
ball. Patient with decreased   
lumbar AROM flexion: 6 in from   
floor with pain and B SB: to   
knees with no pain. Patient with   
decreased core strength: 3+/5 and   
decreased R hip flex: 3/5   
compared to L hip flex: 4-/5.   
Patient to benefit from aquatic   
physical therapy to offload the   
spine and the R hip and to   
improve strength and ROM to   
return to PLOF.  
Therapy Prognosis: Good  
  
  
Decision Making: Low Complexity  
  
Patient Education  
PT eval, POC, HEP  
Pt verbalized/demonstrated good   
understanding: [X] Yes [] No, pt   
required further clarification.  
  
Goals  
Short Term Goals  
Time Frame for Short Term Goals:   
3 weeks  
Short Term Goal 1: Patient to   
initiate HEP for improved lumbar   
ROM and core strength.  
Short Term Goal 2: Patient to   
tolerate 45 min of aquatic   
exercise to decrease low back and   
L hip pain.  
Short Term Goal 3: Patient to be   
instructed in gentle core and B   
hip strengthening to improve   
lumbar stability.  
  
Long Term Goals  
Time Frame for Long Term Goals :   
6 weeks  
Long Term Goal 1: Patient to be   
safe and independent with HEP.  
Long Term Goal 2: Patient to have   
improved core and B hip strength   
>/=4/5 all planes for improved   
lumbar stability.  
Long Term Goal 3: Patient to have   
improved lumbar AROM flexion: 4in   
from floor with no increase in   
pain.  
Long Term Goal 4: Patient to   
report >/=75% improvement in   
symptoms for improved QOL.  
  
Minutes Tracking:  
Time In: 1330  
Time Out: 1410  
Minutes: 40  
Timed Code Treatment Minutes: 39   
Minutes  
  
Patel Easley PT, DPT   
2022  
  
Electronically signed by Patel Easley PT at 2022 2:25 PM   
EST  
documented in this encounter            [employer] Phone:   
[phone]  
   
                                        10- Note     CONSULTATION  
PROCEDURE DATE: 10/26/222  
PREOPERATIVE DIAGNOSIS: Bilateral   
thoracic trapezius spasms and   
bilateral  
lumbar paravertebral spasms.  
POSTOPERATIVE DIAGNOSIS:   
Bilateral thoracic trapezius   
spasms and bilateral  
lumbar paravertebral spasms.  
PROCEDURE: Bilateral thoracic   
trapezius injections and   
bilateral lumbar  
paravertebral injections.  
Subsequent to obtaining informed   
consent, the patient was placed   
in upright  
standing forward flexion   
position. A 25 gauge needle with   
2 cc Marcaine, 1 cc  
40 mg of Kenalog, and 2 cc of   
normal saline, was divided into   
four locations.  
Needle was placed to rest inside   
each location of the trigger   
points. Negative  
heme. Medication was injected in   
a slow, fan-like pattern. Patient   
tolerated  
the procedure well with no overt   
complications, will be followed   
up in the  
office in three months' time.           [facility]  
   
                                        2022 Note     CONSULTATION  
CONSULTATION DATE: 2022  
HISTORY OF PRESENT ILLNESS: This   
is a pleasant, 66-year-old female   
returning  
to the clinic status post   
bilateral lumbar RFAs of L2, L3   
and L4, L5 completed  
on 2022. The patient states   
she is uncertain of the relief as   
she is  
having increased pressure, ache   
and tightening to her lumbar   
region. She  
reports her pain with activity   
7-8/10 and in the standing   
position. Standing,  
working, physical activity is   
aggravating to her pain. The pain   
decreases to  
2/10 with sitting. Patient is a   
 at a restaurant. Current   
medications  
include diclofenac 50 mg b.i.d.,   
tramadol 50 mg b.i.d. by her PCP   
for shoulder  
pain, and Xanax p.r.n. She denies   
any new radicular pain, radiating   
pain or  
vasomotor changes.  
Patient's REVIEW OF SYSTEMS /   
PAST MEDICAL HISTORY / ALLERGIES   
and IMAGES have  
been reviewed and they are noted   
on the chart.  
PHYSICAL EXAM:  
VITAL SIGNS: Blood pressure   
167/95, heart rate is 85.   
Temperature is 97.3.  
She is 5'4 , weighs 116.4 kg.  
GENERAL IMPRESSION: Pleasant,   
appropriate, no acute distress.  
FOCUSED EXAM - BACK: Significant   
paravertebral spasming noted to   
lower  
thoracic and lower lumbar   
regions. Compression along these   
muscles reproduced  
the patient's pain   
symptomatology. No reproduction   
of spinal axial pain upon  
compression along the lower   
lumbar facets, indicative of   
successful RFA.  
Josse's point non-tender.   
Negative FABERs and compression   
test. Range of  
motion is functional in lateral   
rotation and flexion/extension.  
MUSCULOSKELETAL: Motor is intact   
bilateral lower extremities, 4/5   
bilaterally.  
Slight muscle atrophy noted to   
bilateral quadriceps. Patient   
walks with an  
antalgic gait without an   
assistive device.  
NEUROLOGICALLY: Radicular sensory   
is intact. Negative   
polyneuropathy.  
DIAGNOSIS: Lumbar and thoracic   
paravertebral spasms, lumbar   
spondylosis,  
lumbar degenerative disc disease   
and chronic lower back pain.  
PLAN: The patient will start on   
baclofen 10 mg q.h.s. We will   
preauthorize  
for thoracic and lumbar   
paravertebral trigger point   
injections. She was  
instructed to use Voltaren 1% gel   
mixed with Vicks VapoRub to her   
back with  
heat application to follow.   
Stretches were demonstrated and   
encouraged as well.  
Upon authorization, patient will   
be brought back to the clinic to   
receive her  
trigger point injections and   
patient agrees to move forward.         [facility]  
   
                                        2022 Note     CONSULTATION  
CONSULTATION DATE: 2022  
CHIEF COMPLAINT: Low back pain.  
HISTORY OF PRESENT ILLNESS: This   
is a very pleasant, 66-year-old   
female who  
has had successful diagnostic   
medial branch blocks on two   
separate occasions.  
Both times afforded the patient   
% relief for 24 hours or   
so. The patient  
works as a . The patient   
currently reports the pain as a   
6/10, worse  
with walking. The patient enjoys   
walking. The patient currently   
takes  
diclofenac 75 mg b.i.d., tramadol   
50 mg b.i.d. but does not take   
this since it  
did not help her, Xanax on a   
p.r.n. basis and has been taking   
a vitamin regimen.  
The patient is focused now with   
regards to her own wellness and   
health.  
The patient's PAST MEDICAL   
HISTORY / SURGICAL HISTORY /   
REVIEW OF SYSTEMS are  
noted on the chart, along with   
the MEDICATION LIST / ALLERGIES   
and RADIOLOGICAL  
IMAGES.  
PHYSICAL EXAMINATION:  
GENERAL: Upon physical   
examination, this is a pleasant,   
cooperative female who  
does not appear to be in any   
acute distress.  
VITAL SIGN: Slightly elevated at   
135/92 with a heart rate of 62.   
At a height  
of 5'4 , the patient weighs 116   
kg.  
FOCUSED EVALUATION: Extension,   
compression, direct palpation   
along the  
posterior elements aggravate the   
patient's pain concordant with   
facet  
arthropathy, lumbar spondylosis.  
EXTREMITIES: No pedal edema is   
noted. Quality of the muscles is   
poor; however,  
within normal limits.  
NEUROLOGICALLY: The patient does   
not have any radicular   
symptomatology.  
PSYCHIATRICALLY: Affect is   
appropriate.  
IMPRESSION: Chronic vertebrogenic   
low back pain, lumbar   
degenerative disc  
disease, lumbar spondylosis.  
PLAN: We will do a U-Tox in the   
office today. Questions/answers   
were done.  
The patient will be scheduled for   
a therapeutic rhizotomy   
radiofrequency  
ablation of the dorsal rami at   
the level of L2-L3 and L4-L5   
bilaterally,  
initially on the right hand side,   
followed by the left hand side.   
The patient  
understands and would like to   
proceed.  
IFC Signed and Approved by: DR SAMEER HAGAN .  
2022 09:28:00                     [facility]  
   
                                        2022 Note     CONSULTATION  
CONSULTATION DATE: 2022  
This is a pleasant 66-year-old   
female returning to the clinic   
status post #1  
bilateral MBB to L2, L3 and L4,   
L5 that afforded her 100% relief   
for two days.  
Following that her pain returned   
to baseline which was is 6 out of   
10. The  
patient reports activity such as   
standing, walking, ADLs and   
physical activity  
as aggravating factors; sitting   
and sleeping relieves her pain.   
She currently  
takes Tylenol, diclofenac 50 mg   
b.i.d., multivitamin and fish   
oil. She is  
pleased with the results and   
would like to move forward with   
the second  
injection. The patient is   
somewhat concerned that she has a   
2-day-long  
shopping trip with her daughter   
coming up this weekend and she is   
afraid her  
pain will become too great with   
all the walking. She denies any   
new radicular  
pain or vasomotor changes.  
REVIEW OF SYSTEMS, PAST MEDICAL   
HISTORY, ALLERGIES AND IMAGES:   
Have been  
reviewed and noted in the chart.  
PHYSICAL EXAM:  
VITAL SIGNS: Blood pressure   
96/66, heart rate is 56,   
temperature is 97.4.  
Height is 5'4 , weighs 115.6 kg.  
GENERAL APPEARANCE: Pleasant,   
appropriate and in no acute   
distress sitting in  
the chair.  
FOCUSED EXAM:  
BACK: Range of motion is guarded   
in lateral rotation and   
flexion/extension.  
Reproduction of spinoaxial pain   
noted to direct compression along   
the posterior  
elements of the facets of L2, L3,   
and L4, L5 which was concordant   
with ill  
facet arthropathy, lumbar   
spondylosis. Josse's point   
mildly tender to the  
right with referral pain to the   
right hip.  
MUSCULOSKELETAL: Motor is intact,   
4 out of 5 bilaterally. The   
patient  
ambulates with a stable gait.  
NEUROLOGICAL: Negative   
polyneuropathy, bilateral   
patellar reflexes +2.  
DIAGNOSIS: Lumbar spondylosis,   
lumbar degenerative disk,   
spinoaxial lower back  
pain, obesity.  
PLAN: We will move forward with   
the #2 bilateral MBB to L2, 3 and   
L4, L5  
pending insurance authorization.   
In regards to her upcoming trip,   
I will give  
her 3 days' worth of Tramadol 50   
mg b.i.d. and dispense 6 tablets.   
The patient  
was made aware of the risks and   
benefits of this medication. In   
the meantime,  
nutrition importance was   
discussed as was vitamins. She is   
to continue with  
heat rub and heat source daily to   
her back. The patient agrees with   
the plan  
of care and would like to move   
forward and she will be followed   
up in the  
office post-procedure.  
IFC Signed and Approved by: JAI MURRAY .  
2022 15:07:00                     [facility]  
   
                                        Evaluation + Plan note   
  
  
Future Appointments  
  
  
Appointment Date:2024   
02:00:00 PM  
Scheduled Provider:Oh SALMERON MD  
Location:[facility]  
Appointment Type: Procedure 30  
                                        [facility] [place]  
Work Phone:   
[phone]  
   
                                        Evaluation + Plan note   
  
  
Future Appointments  
  
  
Appointment Date:2024   
02:00:00 PM  
Scheduled Provider:Oh SALMERON MD  
Location:[facility]  
Appointment Type: Established   
15  
                                        [facility] [place]  
Work Phone:   
[phone]  
   
                                        Evaluation note     No assessment inform  
ation   
available                               [facility]  
Work Phone:   
[phone]  
   
                                        Hospital course Narrative   
  
  
No data available for this   
section                                 [facility] [place]  
Work Phone:   
[phone]  
   
                                                    Hospital Discharge   
instructions                              
  
  
No data available for this   
section                                 [facility] [place]  
Work Phone:   
[phone]  
   
                                        Progress note         
  
  
No data available for this   
section                                 [facility] [place]  
Work Phone:   
[phone]  
  
  
  
Advance Directives  
No Advanced Directives Records FoundDocuments on File  
  
                          Type         Date Recorded Patient Representative Expl  
anation  
   
                          ACP-Advance Directive                             
   
                          ACP-Power of                              
  
                           Latest Code Status on File  
  
                          Code Status  Date Activated Date Inactivated Comments  
   
                          Full Code    2012 4:45 PM 2012 8:39 AM   
  
  
  
                                                                   
   
                          Full Code    2012 4:33 PM 2012 7:14 PM   
  
  
  
Summary Purpose  
  
  
                                                      
  
  
  
Family History  
No Family History Records FoundNo Family History Records FoundNo Family History 
Records FoundNo Family History Records FoundNo Family History Records FoundNo 
Family History Records Found  
  
No data available for this section  
  
  
  
No data available for this section  
  
No Family History Records FoundNo Family History Records Found  
  
Additional Source Comments  
  
  
  
                                                    Care Teams (unrecognized sec  
tion and content)  
   
                                          
  
  
  
                      Team Member Relationship Specialty  Start Date End Date  
   
                                                      
  
  
Maki Veliz MD  
  
  
[address] [place] [address]  
  
  
 (Work)  
  
  
[phone] (Fax) PCP - General                   9/10/12           
  
  
  
                      Team Member Relationship Specialty  Start Date End Date  
   
                                                      
  
  
Maki Veliz MD  
  
  
[address] [id number]  
  
  
 (Work)  
  
  
[phone] (Fax) PCP - General                   9/10/12           
  
  
  
                      Team Member Relationship Specialty  Start Date End Date  
   
                                                      
  
  
Maki Veliz MD  
  
  
[address] [id number]  
  
  
 (Work)  
  
  
[phone] (Fax) PCP - General                   9/10/12           
  
  
  
                      Team Member Relationship Specialty  Start Date End Date  
   
                                                      
  
  
Maki Veliz MD  
  
  
[address] [id number]  
  
  
 (Work)  
  
  
[phone] (Fax) PCP - General                   9/10/12           
  
  
  
                                                    Team Status: Active   
   
                          Member       Role         Status       Dates  
   
                          Maki Veliz MD Primary Care Provider Active         
  
  
  
                                                    Team Status: Inactive   
   
                          Member       Role         Status       Dates  
   
                          Maki Veliz MD Primary Care Provider Active         
Start: 2024  
End: 2024  
   
                          Oh Salmeron MD [facility] Attending Provider Active      
   Start: 2024  
End: 2024  
  
  
  
  
  
                                                    INFORMATION SOURCE (unrecogn  
ized section and content)  
   
                                          
  
  
  
                                        DATE CREATED        AUTHOR  
   
                                2022                      [facility]  
  
  
  
                                DATE CREATED    AUTHOR          AUTHOR'S ORGANIZ  
ATION  
   
                                2023                      [facility]  
pital  
  
  
  
                                DATE CREATED    AUTHOR          AUTHOR'S ORGANIZ  
ATION  
   
                                2023                      [facility]  
pital  
  
  
  
                                DATE CREATED    AUTHOR          AUTHOR'S ORGANIZ  
ATION  
   
                                10/06/2023                      [facility]  
  
  
  
                                DATE CREATED    AUTHOR          AUTHOR'S ORGANIZ  
ATION  
   
                                2023                      [facility]  
  
  
  
                                DATE CREATED    AUTHOR          AUTHOR'S ORGANIZ  
ATION  
   
                                2023                      [facility]  
d[facility]  
  
  
  
                                DATE CREATED    AUTHOR          AUTHOR'S ORGANIZ  
ATION  
   
                                2024                      [facility]  
  
  
  
                                DATE CREATED    AUTHOR          AUTHOR'S ORGANIZ  
ATION  
   
                                2024                      [facility]  
  
  
  
  
  
                                                    Goals (unrecognized section   
and content)  
   
                                                    Goals may be documented in a  
n alternate section  
  
FOR RECORDS PERTAINING TO PATIENTS WHO ARE OR HAVE BEEN ENROLLED IN A CHEMICAL 
DEPENDENCY/SUBSTANCEABUSE PROGRAM, SOME INFORMATION MAY BE OMITTED. This 
clinical summary was aggregated from multiple sources. Caution should be 
exercised in using it in the provision of clinical care. This summary normalizes
 information from multiple sources, and as a consequence, information in this 
document may materially change the coding, format and clinical context of 
patient data. In addition, data may be omitted in some cases. CLINICAL DECISIONS
 SHOULD BE BASED ON THE PRIMARY CLINICAL RECORDS. [facility]. provides
 no warranty or guarantee of the accuracy or completeness of information in this
 document.

## 2024-06-20 ENCOUNTER — HOSPITAL ENCOUNTER (OUTPATIENT)
Dept: PHYSICAL THERAPY | Age: 69
Setting detail: THERAPIES SERIES
Discharge: HOME | End: 2024-06-20
Payer: MEDICARE

## 2024-06-20 PROCEDURE — 97161 PT EVAL LOW COMPLEX 20 MIN: CPT

## 2024-06-20 PROCEDURE — 97110 THERAPEUTIC EXERCISES: CPT

## 2024-06-20 ASSESSMENT — PAIN SCALES - QUEBEC BACK PAIN DISABILITY SCALE
CLIMB ONE FLIGHT OF STAIRS: FAIRLY DIFFICULT
BEND OVER TO CLEAN THE BATHTUB: UNABLE TO DO
TOTAL SCORE: 72
STAND UP FOR 20 TO 30 MINUTES: VERY DIFFICULT
TURN OVER IN BED: FAIRLY DIFFICULT
MOVE A CHAIR: VERY DIFFICULT
WALK SEVERAL KILOMETERS  OR MILES: UNABLE TO DO
THROW A BALL: UNABLE TO DO
GET OUT OF BED: FAIRLY DIFFICULT
PUT ON SOCKS OR PANYHOSE: SOMEWHAT DIFFICULT
SIT IN A CHAIR FOR SEVERAL HOURS: MINIMALLY DIFFICULT
REACH UP TO HIGH SHELVES: FAIRLY DIFFICULT
RUN ONE BLOCK OR 100M: UNABLE TO DO
CARRY TWO BAGS OF GROCERIES: FAIRLY DIFFICULT
WALK A FEW BLOCKS OR 300 TO 400M: VERY DIFFICULT
MAKE YOUR BED: VERY DIFFICULT
LIFT AND CARRY A HEAVY SUITCASE: UNABLE TO DO
SLEEP THROUGH THE NIGHT: VERY DIFFICULT
TAKE FOOD OUT OF THE REFRIGERATOR: FAIRLY DIFFICULT
PULL OR PUSH HEAVY DOORS: VERY DIFFICULT
RIDE IN A CAR: SOMEWHAT DIFFICULT

## 2024-06-20 NOTE — PLAN OF CARE
[facility]           Phone: [phone]             Outpatient Physical Therapy  Fax: [phone]                                           Date: 2024  Patient: Cristy Glez : 1955 CSN #: [id number]   Referring Physician: Aleksandra Davila MD      [x] Plan of Care   [] Updated Plan of Care    Dates of Service to Include: 2024 to 24    Diagnosis:  Lumbar DDD, M51.36; s/p surgery, Z47.89     Rehab (Treatment) Diagnosis:  Low back pain         Onset Date:  24    Attendance  Total # of Visits to Date: 1 No Show: 0 Canceled Appointment: 0    Assessment  Assessment: Patient is 68 year old female with dx of lumbar DDD who presents with increased SIJ pain with prolonged standing and walking since her lumbar fusion on 4/3/24.Moderate TTP L3-5 spinous processes and B PSIS, R worse than L; (-) B slump, SLR, (-) B CHITO, good pelvic alignment. TTP B greater trochanters and glut med. Decreased core and B hip strength 4-/5 grossly all planes. Decreased lumbar ROM flexion: mid shin and B SB: knees with no increase in pain. Patient to benefit from aquatic physical therapy to offload the spine, decrease pain and improve ROM and strength to return to PLOF.      Goals  Short Term Goals  Time Frame for Short Term Goals: 3 weeks  Short Term Goal 1: Patient to initiate HEP for improved core strength and lumbar stability.  Short Term Goal 2: Patient to be instructed in core and B hip strengthening to improve lumbar stability.  Short Term Goal 3: Pt to tolerate 45 min of aquatic exercise to offload the spine and decrease pain.  Long Term Goals  Time Frame for Long Term Goals : 4 weeks  Long Term Goal 1: Patient to be independent and compliant with HEP and aquatic exercise.  Long Term Goal 2: Patient to have improved core and B hip strength >/=4/5 grossly for improved lumbar stability.  Long Term Goal 3: Patient

## 2024-06-20 NOTE — PROGRESS NOTES
a chair for several hours: Minimally difficult  Climb one flight of stairs: Fairly difficult  Walk a few blocks (300-400 m): Very difficult  Walk several kilometers - miles: Unable to do  Reach up to high shelves: Fairly difficult  Throw a ball: Unable to do  Run one block (about 100 m): Unable to do  Take food out of the refrigerator: Fairly difficult  Make your bed: Very difficult  Put on socks (pantyhose): Somewhat difficult  Bend over to clean the bathtub: Unable to do  Move a chair: Very difficult  Pull or push heavy doors: Very difficult  Carry two bags of groceries: Fairly difficult  Lift and carry a heavy suitcase: Unable to do  [place] Total Score: 72        Assessment  Assessment: Patient is 68 year old female with dx of lumbar DDD who presents with increased SIJ pain with prolonged standing and walking since her lumbar fusion on 4/3/24.Moderate TTP L3-5 spinous processes and B PSIS, R worse than L; (-) B slump, SLR, (-) B CHITO, good pelvic alignment. TTP B greater trochanters and glut med. Decreased core and B hip strength 4-/5 grossly all planes. Decreased lumbar ROM flexion: mid shin and B SB: knees with no increase in pain. Patient to benefit from aquatic physical therapy to offload the spine, decrease pain and improve ROM and strength to return to PLOF.  Therapy Prognosis: Good        Decision Making: Medium Complexity    Patient Education  PT eval, POC, HEP   Pt verbalized/demonstrated good understanding:     [X] Yes         [] No, pt required further clarification.      Goals  Short Term Goals  Time Frame for Short Term Goals: 3 weeks  Short Term Goal 1: Patient to initiate HEP for improved core strength and lumbar stability.  Short Term Goal 2: Patient to be instructed in core and B hip strengthening to improve lumbar stability.  Short Term Goal 3: Pt to tolerate 45 min of aquatic exercise to offload the spine and decrease pain.    Long Term Goals  Time Frame for Long Term Goals : 4 weeks  Long

## 2024-06-24 ENCOUNTER — HOSPITAL ENCOUNTER (OUTPATIENT)
Dept: PHYSICAL THERAPY | Age: 69
Setting detail: THERAPIES SERIES
Discharge: HOME | End: 2024-06-24
Payer: MEDICARE

## 2024-06-24 PROCEDURE — 97113 AQUATIC THERAPY/EXERCISES: CPT

## 2024-06-25 NOTE — PROGRESS NOTES
Phone: [phone]                 [facility]           Fax: [phone]                           Outpatient Physical Therapy                                                                            Daily Note    Patient: Cristy Glez : 1955  CSN #: [id number]   Referring Physician: Aleksandra Davila MD  Date: 2024       Treatment Diagnosis: Low back pain    Onset Date: 24  PT Insurance Information: Medicare  Total # of Visits Approved: 12 Per Physician Order  Total # of Visits to Date: 2  No Show: 0  Canceled Appointment: 0    24 Plan of Care/Recert Due    Pre-Treatment Pain:  5/10  Subjective: Pt. reports pain 5/10 upon arrival this date. HEP going well with no questions at this time.    Exercises:  Exercise 1: HEP: glut sets, PPT, marching with PPT; log roll technique for bed mobility  Exercise 2: aq: in offloaded enviornment to reduce compressive forces:  Exercise 3: aq: Shallow water walking fwd, bwd and lateral x 3 laps with 1 UE support  Exercise 4: Shallow sink therex B LE x 15  Exercise 5: aq: Step stretch 30\" x 3 B LE  Exercise 6: aq: deep well hang x5'  Exercise 7: aq: seated LAQ, marches x15      Assessment  Assessment: Initiated aquatic therapy this date in offloaded enviorment to reduce compressive forces. Initiated gentle walking, LE and core therex to improve strength, mobility and stability. pt reported no increased pain post treatment. Will continue to progress as tolerated.    Activity Tolerance  Activity Tolerance: Patient tolerated treatment well    Patient Education  Patient Education: HEP  Pt verbalized/demonstrated good understanding:     [x] Yes         [] No, pt required further clarification.       Post Treatment Pain:  4-5/10      Plan  Plan Frequency: 2  Plan weeks: 4       Goals  (Total # of Visits to Date: 2)      Short Term Goals  Time Frame for Short Term Goals: 3 weeks  Short Term Goal 1: Patient to initiate HEP for improved core strength

## 2024-06-26 ENCOUNTER — HOSPITAL ENCOUNTER (OUTPATIENT)
Dept: PHYSICAL THERAPY | Age: 69
Setting detail: THERAPIES SERIES
Discharge: HOME | End: 2024-06-26
Payer: MEDICARE

## 2024-06-26 NOTE — PROGRESS NOTES
[facility]  Inpatient/Observation/Outpatient Rehabilitation    Date: 2024  Patient Name: Cristy Glez       [] Inpatient Acute/Observation       []  Outpatient  : 1955          [x] Pt cancelled due to:  [] No Reason Given   [] Sick/ill   [x] Other: weather        Therapist/Assistant will attempt to see this patient, at our earliest opportunity.       Geri Donovan, PTA Date: 2024

## 2024-07-01 ENCOUNTER — HOSPITAL ENCOUNTER (OUTPATIENT)
Dept: PHYSICAL THERAPY | Age: 69
Setting detail: THERAPIES SERIES
Discharge: HOME | End: 2024-07-01
Payer: MEDICARE

## 2024-07-01 PROCEDURE — 97113 AQUATIC THERAPY/EXERCISES: CPT

## 2024-07-01 NOTE — PROGRESS NOTES
Phone: [phone]                 [facility]           Fax: [phone]                           Outpatient Physical Therapy                                                                            Daily Note    Patient: Cristy Glez : 1955  [facility] #: [id number]   Referring Physician: Aleksandra Davila MD  Date: 2024    Diagnosis: Lumbar DDD, M51.36; s/p surgery, Z47.89  Treatment Diagnosis: Low back pain    Onset Date: 24  PT Insurance Information: Medicare  Total # of Visits Approved: 12 Per Physician Order  Total # of Visits to Date: 3  No Show: 0  Canceled Appointment: 0    24 Plan of Care/Recert Due    Pre-Treatment Pain:  8/10  Subjective: Pt reports 8/10 pain R SI region following work    Exercises:  Exercise 1: HEP: glut sets, PPT, marching with PPT; log roll technique for bed mobility  Exercise 2: aq: in offloaded enviornment to reduce compressive forces:  Exercise 3: aq: Shallow water walking fwd, bwd and lateral x 3 laps with 1 UE support  Exercise 4: Shallow sink therex B LE x 15  Exercise 5: aq: Step stretch 30\" x 3 B LE  Exercise 6: aq: deep well hang x5'mm  bicycle and scissors  Exercise 7: aq: seated LAQ, marches x15           Assessment  Assessment: Pt reports 8/10 pain following work. Pain R SI. Aquatic exercise completed with no increase in pain. Reviewed home stretching with good understanding    Activity Tolerance  Activity Tolerance: Patient tolerated treatment well    Patient Education  Patient Education: HEP  Pt verbalized/demonstrated good understanding:     [x] Yes         [] No, pt required further clarification.       Post Treatment Pain:  8/10      Plan  Plan Frequency: 2  Plan weeks: 4       Goals  (Total # of Visits to Date: 3)      Short Term Goals  Time Frame for Short Term Goals: 3 weeks  Short Term Goal 1: Patient to initiate HEP for improved core strength and lumbar stability.  Short Term Goal 2: Patient to be instructed in core and B hip

## 2024-07-05 ENCOUNTER — HOSPITAL ENCOUNTER (OUTPATIENT)
Dept: PHYSICAL THERAPY | Age: 69
Setting detail: THERAPIES SERIES
Discharge: HOME | End: 2024-07-05
Payer: MEDICARE

## 2024-07-05 PROCEDURE — 97113 AQUATIC THERAPY/EXERCISES: CPT

## 2024-07-05 NOTE — PROGRESS NOTES
Phone: [phone]                 [facility]           Fax: [phone]                           Outpatient Physical Therapy                                                                            Daily Note    Patient: Cristy Glez : 1955  [facility] #: [id number]   Referring Physician: Aleksandra Davila MD  Date: 2024       Treatment Diagnosis: Low back pain    Onset Date: 24  PT Insurance Information: Medicare  Total # of Visits Approved: 12 Per Physician Order  Total # of Visits to Date: 4  No Show: 0  Canceled Appointment: 0    24 Plan of Care/Recert Due    Pre-Treatment Pain:  7/10  Subjective: Patient reports 7/10 LBP prior to aquatic therapy, states no pain when seated only when up and moving around.    Exercises:  Exercise 1: HEP: glut sets, PPT, marching with PPT; log roll technique for bed mobility  Exercise 2: aq: in offloaded enviornment to reduce compressive forces:  Exercise 3: aq: Shallow water walking fwd, bwd and lateral x 3 laps with 1 UE support  Exercise 4: Shallow sink therex B LE x 20  Exercise 5: aq: Step stretch 30\" x 3 B LE  Exercise 6: aq: deep well hang x5'mm  bicycle and scissors  Exercise 7: aq: seated LAQ, marches x15, sit to stand x10      Assessment  Assessment: Progressed patient with sit to stands and increased reps to improve lumbar/core strength with good tolerance. Patient reported reduced lumbar discomfort post aquatic therapy. Continue per tolerance .    Activity Tolerance  Activity Tolerance: Patient tolerated treatment well    Patient Education  Patient Education: HEP  Pt verbalized/demonstrated good understanding:     [x] Yes         [] No, pt required further clarification.       Post Treatment Pain:  6/10      Plan  Plan Frequency: 2  Plan weeks: 4       Goals  (Total # of Visits to Date: 4)      Short Term Goals  Time Frame for Short Term Goals: 3 weeks  Short Term Goal 1: Patient to initiate HEP for improved core strength and

## 2024-07-09 ENCOUNTER — APPOINTMENT (OUTPATIENT)
Dept: PHYSICAL THERAPY | Age: 69
End: 2024-07-09
Payer: MEDICARE

## 2024-07-10 ENCOUNTER — HOSPITAL ENCOUNTER (OUTPATIENT)
Dept: PHYSICAL THERAPY | Age: 69
Setting detail: THERAPIES SERIES
Discharge: HOME | End: 2024-07-10
Payer: MEDICARE

## 2024-07-10 PROCEDURE — 97113 AQUATIC THERAPY/EXERCISES: CPT

## 2024-07-10 NOTE — FLOWSHEET NOTE
Goal 3: Pt to tolerate 45 min of aquatic exercise to offload the spine and decrease pain.-progressing    Long Term Goals  Time Frame for Long Term Goals : 4 weeks  Long Term Goal 1: Patient to be independent and compliant with HEP and aquatic exercise.  Long Term Goal 2: Patient to have improved core and B hip strength >/=4/5 grossly for improved lumbar stability.  Long Term Goal 3: Patient to be able to stand/walk >/=1 hour with no increase in pain for improved functional mobility.  Long Term Goal 4: Patient to report >/=50% improvement in symptoms for improved QOL.    Minutes Tracking:  Time In: 1402  Time Out: 1445  Minutes: 43    Beba Thapa, MELL     Date: 7/10/2024

## 2024-07-11 ENCOUNTER — HOSPITAL ENCOUNTER (OUTPATIENT)
Dept: PHYSICAL THERAPY | Age: 69
Setting detail: THERAPIES SERIES
Discharge: HOME | End: 2024-07-11
Payer: MEDICARE

## 2024-07-11 PROCEDURE — 97113 AQUATIC THERAPY/EXERCISES: CPT

## 2024-07-11 NOTE — PROGRESS NOTES
Phone: [phone]                 [facility]           Fax: [phone]                           Outpatient Physical Therapy                                                                            Daily Note    Patient: Cristy Glez : 1955  [facility] #: [id number]   Referring Physician: Aleksandra Davila MD  Date: 2024    Diagnosis: Lumbar DDD, M51.36; s/p surgery, Z47.89  Treatment Diagnosis: Low back pain    Onset Date: 24  PT Insurance Information: Medicare  Total # of Visits Approved: 12 Per Physician Order  Total # of Visits to Date: 6  No Show: 0  Canceled Appointment: 0    24 Plan of Care/Recert Due    Pre-Treatment Pain:  6/10  Subjective: Pt reports ~6/10 pain upon arrival, she just got off work so her back is usually more sore after that    Exercises:  Exercise 1: HEP: glut sets, PPT, marching with PPT; log roll technique for bed mobility  Exercise 2: aq: in offloaded enviornment to reduce compressive forces:  Exercise 3: aq: Semideep water walking fwd, bwd and lateral x 3 laps with 1 UE support  Exercise 4: Shallow sink therex B LE x 10  Exercise 5: aq: Step stretch 30\" x 3 B LE  Exercise 6: aq: deep well hang x3'  bicycle and scissors x2' ea -3# weights (pt would like to have heavier weights next visit)  Exercise 7: aq: seated LAQ, marches x15, sit to stand x10 -no sit to stands today  Exercise 9: aq: single dumbells: 90/90, chest fly, stir the pot x10 ea  Exercise 10: aq: FSU/LSU semideep x10 ea BLE    Assessment  Assessment: Continued to progress aquatic ther ex with core stabilization exercises, cues provided for postural awareness and core activation with good carry over noted. Progressed deep well hang and ther ex with 3# ankle weights with good tolerance. Pt reports reduced pain post tx. Will continue to progress per pt tolerance    Activity Tolerance  Activity Tolerance: Patient tolerated treatment well    Patient Education  Patient Education: HEP  Pt

## 2024-07-16 ENCOUNTER — HOSPITAL ENCOUNTER (OUTPATIENT)
Dept: PHYSICAL THERAPY | Age: 69
Setting detail: THERAPIES SERIES
Discharge: HOME | End: 2024-07-16
Payer: MEDICARE

## 2024-07-16 PROCEDURE — 97113 AQUATIC THERAPY/EXERCISES: CPT

## 2024-07-17 NOTE — PROGRESS NOTES
Phone: [phone]                 [facility]           Fax: [phone]                           Outpatient Physical Therapy                                                                            Daily Note    Patient: Cristy Glez : 1955  [facility] #: [id number]   Referring Physician: Aleksandra Davila MD  Date: 2024     Treatment Diagnosis: Low back pain    Onset Date: 24  PT Insurance Information: Medicare  Total # of Visits Approved: 12 Per Physician Order  Total # of Visits to Date: 7  No Show: 0  Canceled Appointment: 0    24 Plan of Care/Recert Due    Pre-Treatment Pain:  6/10  Subjective: Pt reports pain is about the same but she worked today.  Pt rates current pain a 6/10.    Exercises:  Exercise 1: HEP: glut sets, PPT, marching with PPT; log roll technique for bed mobility  Exercise 2: aq: in offloaded enviornment to reduce compressive forces:  Exercise 3: aq: Semideep water walking fwd, bwd and lateral x 3 laps with 1 UE support  Exercise 4: Shallow sink therex B LE x 10  Exercise 5: aq: Step stretch 30\" x 3 B LE  Exercise 6: aq: deep well hang x3'  bicycle and scissors x2' ea -5# weights  Exercise 7: aq: seated LAQ, marches x15, sit to stand x10 -no sit to stands today  Exercise 9: aq: single dumbells: 90/90, chest fly, stir the pot x10 ea  Exercise 10: aq: FSU/LSU semideep x10 ea BLE    Assessment  Assessment: Increased tightness noted today in hamstring region with LE stretching. Continued to focus on hip ROM along with pelvic stability to reduce pressure on LB in aquatic environment.  Will continue.    Activity Tolerance  Activity Tolerance: Patient tolerated treatment well    Patient Education  Patient Education: HEP  Pt verbalized/demonstrated good understanding:     [x] Yes         [] No, pt required further clarification.     Post Treatment Pain:  10    Plan  Plan Frequency: 2  Plan weeks: 4     Goals  (Total # of Visits to Date: 7)      Short Term

## 2024-07-18 ENCOUNTER — HOSPITAL ENCOUNTER (OUTPATIENT)
Dept: PHYSICAL THERAPY | Age: 69
Setting detail: THERAPIES SERIES
Discharge: HOME | End: 2024-07-18
Payer: MEDICARE

## 2024-07-18 PROCEDURE — 97113 AQUATIC THERAPY/EXERCISES: CPT

## 2024-07-18 NOTE — PROGRESS NOTES
Phone: [phone]                 [facility]           Fax: [phone]                           Outpatient Physical Therapy                                                                            Daily Note    Patient: Cristy Glez : 1955  CSN #: [id number]   Referring Physician: Aleksandra Davila MD  Date: 2024    Diagnosis: Lumbar DDD, M51.36; s/p surgery, Z47.89  Treatment Diagnosis: Low back pain    Onset Date: 24  PT Insurance Information: Medicare  Total # of Visits Approved: 12 Per Physician Order  Total # of Visits to Date: 8  No Show: 0  Canceled Appointment: 0    24 Plan of Care/Recert Due    Pre-Treatment Pain:  8/10  Subjective: Pt reports increased pain today 8/10. She just got off work so is usually more sore    Exercises:  Exercise 1: HEP: glut sets, PPT, marching with PPT; log roll technique for bed mobility  Exercise 2: aq: in offloaded enviornment to reduce compressive forces:  Exercise 3: aq: Semideep water walking fwd, bwd and lateral x 3 laps with 1 UE support  Exercise 5: aq: Step stretch 30\" x 3 B LE  Exercise 6: aq: deep well hang x3'  bicycle and scissors x2' ea -5# weights  Exercise 7: aq: seated LAQ, marches x15, sit to stand x10 -no sit to stands today  Exercise 9: aq: single dumbells: 90/90, chest fly, stir the pot x10 ea  Exercise 10: aq: FSU/LSU semideep x10 ea BLE    Assessment  Assessment: Pt arrives to session with increased LBP after work. Continued with current aquatic routine with pt reporting relief post tx. Verbal cues provided t/o tx for postural awareness and core activation with good mario over. Will continue to progress per pt tolerance    Activity Tolerance  Activity Tolerance: Patient tolerated treatment well    Patient Education  Patient Education: HEP  Pt verbalized/demonstrated good understanding:     [x] Yes         [] No, pt required further clarification.    Post Treatment Pain:  6/10      Plan  Plan Frequency: 2  Plan

## 2024-07-22 ENCOUNTER — HOSPITAL ENCOUNTER (OUTPATIENT)
Dept: PHYSICAL THERAPY | Age: 69
Setting detail: THERAPIES SERIES
Discharge: HOME | End: 2024-07-22
Payer: MEDICARE

## 2024-07-22 PROCEDURE — 97113 AQUATIC THERAPY/EXERCISES: CPT

## 2024-07-23 NOTE — PROGRESS NOTES
Phone: [phone]                 [facility]           Fax: [phone]                           Outpatient Physical Therapy                                                                            Daily Note    Patient: Cristy Glez : 1955  [facility] #: [id number]   Referring Physician: Aleksandra Davila MD  Date: 2024     Treatment Diagnosis: Low back pain    Onset Date: 24  PT Insurance Information: Medicare  Total # of Visits Approved: 20 Per Physician Order  Total # of Visits to Date: 9  No Show: 0  Canceled Appointment: 0    24 Plan of Care/Recert Due    Pre-Treatment Pain:  6/10  Subjective: Pt states she just got off work again at [employer] and she notices increased discomfort with standing on concrete vs wood at her other job. Pt rates current pain a 6/10.    Exercises:  Exercise 2: aq: in offloaded enviornment to reduce compressive forces:  Exercise 3: aq: Semideep water walking fwd, bwd and lateral x 3 laps with 1 UE support  Exercise 4: Shallow sink therex B LE x 10  Exercise 5: aq: Step stretch 30\" x 3 B LE  Exercise 6: aq: deep well hang x3'  bicycle and scissors x2' ea -5# weights  Exercise 7: aq: seated LAQ, marches x15, sit to stand x10 -no sit to stands today  Exercise 8: UE ex: shoulder flexion, habd x20  Exercise 10: aq: FSU/LSU semideep x10 ea BLE    Assessment  Assessment: Pt able to forward flex fingertips to mid shin. Pt continued aquatic program with fair tolerance noted.    Activity Tolerance  Activity Tolerance: Patient tolerated treatment well    Patient Education  Patient Education: HEP  Pt verbalized/demonstrated good understanding:     [x] Yes         [] No, pt required further clarification.     Post Treatment Pain:  6/10    Plan  Plan Frequency: 2  Plan weeks: 4     Goals  (Total # of Visits to Date: 9)      Short Term Goals  Time Frame for Short Term Goals: 3 weeks  Short Term Goal 1: Patient to initiate HEP for improved core strength and lumbar

## 2024-07-24 ENCOUNTER — HOSPITAL ENCOUNTER (OUTPATIENT)
Dept: PHYSICAL THERAPY | Age: 69
Setting detail: THERAPIES SERIES
Discharge: HOME | End: 2024-07-24
Payer: MEDICARE

## 2024-07-24 PROCEDURE — 97113 AQUATIC THERAPY/EXERCISES: CPT

## 2024-07-25 ENCOUNTER — APPOINTMENT (OUTPATIENT)
Dept: PHYSICAL THERAPY | Age: 69
End: 2024-07-25
Payer: MEDICARE

## 2024-07-25 NOTE — PROGRESS NOTES
Phone: [phone]                 [facility]           Fax: [phone]                           Outpatient Physical Therapy                                                                            Daily Note    Patient: Cristy Glez : 1955  CSN #: [id number]   Referring Physician: Aleksandra Davila MD  Date: 2024     Treatment Diagnosis: Low back pain    Onset Date: 24  PT Insurance Information: Medicare  Total # of Visits Approved: 20 Per Physician Order  Total # of Visits to Date: 10  No Show: 0  Canceled Appointment: 0    24 Plan of Care/Recert Due    Pre-Treatment Pain:  5/10  Subjective: Pt reports she worked today so shes a little sore but she has noticed some improvement.  Pt rates current pain a 4-5/10.    Exercises:  Exercise 1: HEP: glut sets, PPT, marching with PPT; log roll technique for bed mobility  Exercise 2: aq: in offloaded enviornment to reduce compressive forces: full exercise session with 2# weights  Exercise 3: aq: Semideep water walking fwd, bwd and lateral x 3 laps with 1 UE support  Exercise 4: Shallow sink therex B LE x 10  Exercise 5: aq: Step stretch 30\" x 3 B LE  Exercise 6: aq: deep well hang x3'  bicycle and scissors x2' ea -5# weights  Exercise 7: aq: seated LAQ, marches x15, sit to stand x10 -no sit to stands today  Exercise 10: aq: FSU/LSU semideep x10 ea BLE    Assessment  Assessment: Completed full session today with 2# weights in aquatic environment.  Pt displays improvements since begining therapy but continues to display LBP with work duties.  Wioll continue.    Activity Tolerance  Activity Tolerance: Patient tolerated treatment well    Patient Education  Patient Education: HEP  Pt verbalized/demonstrated good understanding:     [x] Yes         [] No, pt required further clarification.     Post Treatment Pain:  4/10    Plan  Plan Frequency: 2  Plan weeks: 4     Goals  (Total # of Visits to Date: 10)      Short Term Goals  Time

## 2024-08-01 ENCOUNTER — HOSPITAL ENCOUNTER (OUTPATIENT)
Dept: PHYSICAL THERAPY | Age: 69
Setting detail: THERAPIES SERIES
Discharge: HOME | End: 2024-08-01
Payer: MEDICARE

## 2024-08-01 PROCEDURE — 97113 AQUATIC THERAPY/EXERCISES: CPT

## 2024-08-01 NOTE — PROGRESS NOTES
Phone: [phone]                 [facility]           Fax: [phone]                           Outpatient Physical Therapy                                                                            Daily Note    Patient: Cristy Glez : 1955  [facility] #: [id number]   Referring Physician: Aleksandra Davila MD  Date: 2024       Treatment Diagnosis: Low back pain    Onset Date: 24  PT Insurance Information: Medicare  Total # of Visits Approved: 20 Per Physician Order  Total # of Visits to Date: 11  No Show: 0  Canceled Appointment: 0    24 Plan of Care/Recert Due    Pre-Treatment Pain:  5/10  Subjective: patient reports 5/10 pain in low back prior to therapy session after walking this morning.    Exercises:  Exercise 1: HEP: glut sets, PPT, marching with PPT; log roll technique for bed mobility  Exercise 2: aq: in offloaded enviornment to reduce compressive forces: full exercise session with 2# weights  Exercise 3: aq: Semideep water walking fwd, bwd and lateral x 3 laps with 1 UE support  Exercise 4: Shallow sink therex B LE x 15  Exercise 5: aq: Step stretch 30\" x 3 B LE  Exercise 6: aq: deep well hang x3'  bicycle and scissors x2' ea -5# weights  Exercise 7: aq: seated LAQ, marches x15, sit to stand x10 -no sit to stands today  Exercise 8: UE ex: shoulder flexion, habd x20  Exercise 9: aq: single dumbells: 90/90, chest fly, stir the pot, punching bag x10 ea  Exercise 10: aq: FSU/LSU semideep x10 ea BLE    Assessment  Assessment: Progressed core and UE strengthening this date with added ther ex, good tolerance. Patient reports relief folowing aquatic therapy, continue per tolerance.    Activity Tolerance  Activity Tolerance: Patient tolerated treatment well    Patient Education  Patient Education: HEP  Pt verbalized/demonstrated good understanding:     [x] Yes         [] No, pt required further clarification.       Post Treatment Pain:  4/10      Plan  Plan Frequency: 2  Plan

## 2024-08-07 ENCOUNTER — HOSPITAL ENCOUNTER (OUTPATIENT)
Dept: PHYSICAL THERAPY | Age: 69
Setting detail: THERAPIES SERIES
Discharge: HOME | End: 2024-08-07
Payer: MEDICARE

## 2024-08-07 PROCEDURE — 97113 AQUATIC THERAPY/EXERCISES: CPT

## 2024-08-07 NOTE — PROGRESS NOTES
Phone: [phone]                 [facility]           Fax: [phone]                           Outpatient Physical Therapy                                                                            Daily Note    Patient: Cristy Glez : 1955  [facility] #: [id number]   Referring Physician: Aleksandra Davila MD  Date: 2024    Diagnosis: Lumbar DDD, M51.36; s/p surgery, Z47.89  Treatment Diagnosis: Low back pain    Onset Date: 24  PT Insurance Information: Medicare  Total # of Visits Approved: 20 Per Physician Order  Total # of Visits to Date: 12  No Show: 0  Canceled Appointment: 0    24 Plan of Care/Recert Due    Pre-Treatment Pain:  76/10  Subjective: Pt states pain is 7/10 L LB. States the pool is really helping her    Exercises:  Exercise 2: aq: in offloaded enviornment to reduce compressive forces: full exercise session with 2# weights  Exercise 3: aq: Semideep water walking fwd, bwd and lateral x 3 laps with 1 UE support  Exercise 4: Shallow sink therex B LE x 15  Exercise 5: aq: Step stretch 30\" x 3 B LE  Exercise 6: aq: deep well hang x3'  bicycle and scissors x2' ea -5# weights  Exercise 7: aq: seated LAQ, marches x15, sit to stand x10 -no sit to stands today  Exercise 8: UE ex: shoulder flexion, habd x20  Exercise 9: aq: single dumbells: 90/90, chest fly, stir the pot, punching bag x10 ea  Exercise 10: aq: FSU/LSU semideep x20 ea BLE           Assessment  Assessment: Pt with 7/10 pain. Pt states she really gets relief from aquatic therapy. Pain mainly L LB region. Continued with aquatic exercise to offload LB and to decrease pain. Pt felt better after session.    Activity Tolerance  Activity Tolerance: Patient tolerated treatment well    Patient Education  Patient Education: HEP  Pt verbalized/demonstrated good understanding:     [x] Yes         [] No, pt required further clarification.       Post Treatment Pain:  5/10      Plan  Plan Frequency: 2  Plan weeks:

## 2024-08-14 ENCOUNTER — HOSPITAL ENCOUNTER (OUTPATIENT)
Dept: PHYSICAL THERAPY | Age: 69
Setting detail: THERAPIES SERIES
Discharge: HOME | End: 2024-08-14
Payer: MEDICARE

## 2024-08-14 PROCEDURE — 97113 AQUATIC THERAPY/EXERCISES: CPT

## 2024-08-14 NOTE — PROGRESS NOTES
Phone: [phone]                 [facility]           Fax: [phone]                           Outpatient Physical Therapy                                                                            Daily Note    Patient: Cristy Glez : 1955  CSN #: [id number]   Referring Physician: Aleksandra Davila MD  Date: 2024    Diagnosis: Lumbar DDD, M51.36; s/p surgery, Z47.89  Treatment Diagnosis: Low back pain    Onset Date: 24  PT Insurance Information: Medicare  Total # of Visits Approved: 20 Per Physician Order  Total # of Visits to Date: 13  No Show: 0  Canceled Appointment: 0    24 Plan of Care/Recert Due    Pre-Treatment Pain:  5-6/10  Subjective: Pt reports 5-6/10 LBP, states it isn't too bad today    Exercises:  Exercise 1: HEP: glut sets, PPT, marching with PPT; log roll technique for bed mobility  Exercise 2: aq: in offloaded enviornment to reduce compressive forces: full exercise session with 5# weights  Exercise 3: aq: Semideep water walking fwd, bwd and lateral x 3 laps with 1 UE support  Exercise 4: Shallow sink therex B LE x 15  Exercise 5: aq: Step stretch 30\" x 3 B LE  Exercise 6: aq: deep well hang x5'  bicycle and scissors x2' ea -5# weights  Exercise 7: aq: seated LAQ, marches x15, sit to stand x10 -no sit to stands today  Exercise 9: aq: green bells: 90/90, chest fly, stir the pot, punching bag x15 ea  Exercise 10: aq: FSU/LSU semideep x15 ea BLE    Assessment  Assessment: Progressed pt with 5# ankle weights entire session with good tolerance. Pt reported increased LBP with step stretches this date, with pain subsiding upon completion of activity. Will continue to progress per pt tolerance    Activity Tolerance  Activity Tolerance: Patient tolerated treatment well    Patient Education  Patient Education: HEP  Pt verbalized/demonstrated good understanding:     [x] Yes         [] No, pt required further clarification.    Post Treatment Pain:

## 2024-08-16 ENCOUNTER — HOSPITAL ENCOUNTER (OUTPATIENT)
Dept: PHYSICAL THERAPY | Age: 69
Setting detail: THERAPIES SERIES
Discharge: HOME | End: 2024-08-16
Payer: MEDICARE

## 2024-08-16 PROCEDURE — 97113 AQUATIC THERAPY/EXERCISES: CPT

## 2024-08-16 NOTE — PROGRESS NOTES
Phone: [phone]                 [facility]           Fax: [phone]                           Outpatient Physical Therapy                                                                            Daily Note    Patient: Cristy Glez : 1955  [facility] #: [id number]   Referring Physician: Aleksandra Davila MD  Date: 2024       Treatment Diagnosis: Low back pain    Onset Date: 24  PT Insurance Information: Medicare  Total # of Visits Approved: 20 Per Physician Order  Total # of Visits to Date: 14  No Show: 0  Canceled Appointment: 0    24 Plan of Care/Recert Due    Pre-Treatment Pain:  7/10  Subjective: patient reports 7/10 LBP, states she feels increased soreness today \"but I hurt everyday\"    Exercises:  Exercise 1: HEP: glut sets, PPT, marching with PPT; log roll technique for bed mobility  Exercise 2: aq: in offloaded enviornment to reduce compressive forces: full exercise session with 5# weights  Exercise 3: aq: Shallow water walking fwd, bwd and lateral x 3 laps with 1 UE support  Exercise 4: Shallow sink therex B LE x 15, mini squats, lunges x15  Exercise 5: aq: Step stretch 30\" x 3 B LE  Exercise 6: aq: deep well hang x5'  bicycle and scissors x2' ea -5# weights  Exercise 7: aq: seated LAQ, marches x15, sit to stand x10 -no sit to stands today  Exercise 8: UE ex: shoulder flexion, habd x20  Exercise 9: aq: green bells: 90/90, chest fly, stir the pot, punching bag x15 ea  Exercise 10: aq: FSU/LSU semideep x15 ea BLE    Assessment  Assessment: Progressed patient with shallow side ther ex and ambulation with good tolerance. Added lunges to improve B LE strengthening with no increase in pain or discomfort. Pt progressing fair towards LTG with reports on 25-30% improvements and fair progress towards standing/walking 1 hour. Continue per tolerance.    Activity Tolerance  Activity Tolerance: Patient tolerated treatment well    Patient Education  Patient Education: HEP  Pt

## 2024-08-19 ENCOUNTER — HOSPITAL ENCOUNTER (OUTPATIENT)
Dept: PHYSICAL THERAPY | Age: 69
Setting detail: THERAPIES SERIES
Discharge: HOME | End: 2024-08-19
Payer: MEDICARE

## 2024-08-19 PROCEDURE — 97113 AQUATIC THERAPY/EXERCISES: CPT

## 2024-08-19 NOTE — PROGRESS NOTES
Goals  Time Frame for Short Term Goals: 3 weeks  Short Term Goal 1: Patient to initiate HEP for improved core strength and lumbar stability.-met  Short Term Goal 2: Patient to be instructed in core and B hip strengthening to improve lumbar stability. -MET  Short Term Goal 3: Pt to tolerate 45 min of aquatic exercise to offload the spine and decrease pain.-met    Long Term Goals  Time Frame for Long Term Goals : 4 weeks  Long Term Goal 1: Patient to be independent and compliant with HEP and aquatic exercise. - met  Long Term Goal 2: Patient to have improved core and B hip strength >/=4/5 grossly for improved lumbar stability. - progressing  Long Term Goal 3: Patient to be able to stand/walk >/=1 hour with no increase in pain for improved functional mobility. - progressing  Long Term Goal 4: Patient to report >/=50% improvement in symptoms for improved QOL. - \"in between 25-30%\"    Minutes Tracking:  Time In: 1345  Time Out: 1439  Minutes: 54  Timed Code Treatment Minutes: 45 Minutes        Evan Carmichael     Date: 8/19/2024

## 2024-08-22 ENCOUNTER — HOSPITAL ENCOUNTER (OUTPATIENT)
Dept: PHYSICAL THERAPY | Age: 69
Setting detail: THERAPIES SERIES
Discharge: HOME | End: 2024-08-22
Payer: MEDICARE

## 2024-08-23 NOTE — PLAN OF CARE
improved QOL. - \"in between 25-30%\"     Prognosis  Therapy Prognosis: Good    Treatment Plan   Plan Frequency: 2  Plan weeks: 4  [x] HP/CP      [] Electrical Stim   [x] Therapeutic Exercise      [x] Gait Training  [x] Aquatics   [] Ultrasound         [x] Patient Education/HEP   [x] Manual Therapy  [] Traction    [x] Neuro-benedict        [x] Soft Tissue Mobs            [x] Therapeutic Activity  [] Iontophoresis    [] Orthotic casting/fitting      [] Dry Needling  [] Blood Flow Restriction [] Vasopneumatic Compression             Electronically signed by: Jenny Marion PT, DPT    Date: 8/22/2024      ______________________________________ Date: 8/22/2024   Physician Signature

## 2024-08-26 ENCOUNTER — HOSPITAL ENCOUNTER (OUTPATIENT)
Dept: PHYSICAL THERAPY | Age: 69
Setting detail: THERAPIES SERIES
Discharge: HOME | End: 2024-08-26
Payer: MEDICARE

## 2024-08-26 NOTE — PROGRESS NOTES
[facility]  Inpatient/Observation/Outpatient Rehabilitation    Date: 2024  Patient Name: Cristy Glez         [x]  Outpatient  : 24 Plan of Care/Recert ends       [x] Pt cancelled due to:    [x] Other: Allergies    Therapist/Assistant will attempt to see this patient, at our earliest opportunity.       Alondra Johnson, PTA Date: 2024

## 2024-08-29 ENCOUNTER — APPOINTMENT (OUTPATIENT)
Dept: PHYSICAL THERAPY | Age: 69
End: 2024-08-29
Payer: MEDICARE

## 2024-08-30 ENCOUNTER — HOSPITAL ENCOUNTER (OUTPATIENT)
Dept: PHYSICAL THERAPY | Age: 69
Setting detail: THERAPIES SERIES
End: 2024-08-30
Payer: MEDICARE

## 2025-03-19 ENCOUNTER — OFFICE VISIT (OUTPATIENT)
Dept: PRIMARY CARE CLINIC | Age: 70
End: 2025-03-19
Payer: MEDICARE

## 2025-03-19 VITALS
SYSTOLIC BLOOD PRESSURE: 118 MMHG | RESPIRATION RATE: 18 BRPM | WEIGHT: 207 LBS | BODY MASS INDEX: 36.68 KG/M2 | HEART RATE: 58 BPM | OXYGEN SATURATION: 94 % | HEIGHT: 63 IN | DIASTOLIC BLOOD PRESSURE: 60 MMHG

## 2025-03-19 DIAGNOSIS — E67.3 HYPERVITAMINOSIS D: ICD-10-CM

## 2025-03-19 DIAGNOSIS — E03.9 HYPOTHYROIDISM, UNSPECIFIED TYPE: ICD-10-CM

## 2025-03-19 DIAGNOSIS — Z78.0 POST-MENOPAUSAL: ICD-10-CM

## 2025-03-19 DIAGNOSIS — I10 HYPERTENSION, UNSPECIFIED TYPE: ICD-10-CM

## 2025-03-19 DIAGNOSIS — E11.69 TYPE 2 DIABETES MELLITUS WITH OTHER SPECIFIED COMPLICATION, WITHOUT LONG-TERM CURRENT USE OF INSULIN: ICD-10-CM

## 2025-03-19 DIAGNOSIS — E66.9 OBESITY, UNSPECIFIED CLASS, UNSPECIFIED OBESITY TYPE, UNSPECIFIED WHETHER SERIOUS COMORBIDITY PRESENT: ICD-10-CM

## 2025-03-19 DIAGNOSIS — E78.5 HYPERLIPIDEMIA, UNSPECIFIED HYPERLIPIDEMIA TYPE: Primary | ICD-10-CM

## 2025-03-19 PROBLEM — E11.9 DIABETES MELLITUS (HCC): Status: ACTIVE | Noted: 2025-03-19

## 2025-03-19 PROCEDURE — 3078F DIAST BP <80 MM HG: CPT | Performed by: STUDENT IN AN ORGANIZED HEALTH CARE EDUCATION/TRAINING PROGRAM

## 2025-03-19 PROCEDURE — G8419 CALC BMI OUT NRM PARAM NOF/U: HCPCS | Performed by: STUDENT IN AN ORGANIZED HEALTH CARE EDUCATION/TRAINING PROGRAM

## 2025-03-19 PROCEDURE — 1123F ACP DISCUSS/DSCN MKR DOCD: CPT | Performed by: STUDENT IN AN ORGANIZED HEALTH CARE EDUCATION/TRAINING PROGRAM

## 2025-03-19 PROCEDURE — 1159F MED LIST DOCD IN RCRD: CPT | Performed by: STUDENT IN AN ORGANIZED HEALTH CARE EDUCATION/TRAINING PROGRAM

## 2025-03-19 PROCEDURE — 1090F PRES/ABSN URINE INCON ASSESS: CPT | Performed by: STUDENT IN AN ORGANIZED HEALTH CARE EDUCATION/TRAINING PROGRAM

## 2025-03-19 PROCEDURE — 1036F TOBACCO NON-USER: CPT | Performed by: STUDENT IN AN ORGANIZED HEALTH CARE EDUCATION/TRAINING PROGRAM

## 2025-03-19 PROCEDURE — G2211 COMPLEX E/M VISIT ADD ON: HCPCS | Performed by: STUDENT IN AN ORGANIZED HEALTH CARE EDUCATION/TRAINING PROGRAM

## 2025-03-19 PROCEDURE — G8427 DOCREV CUR MEDS BY ELIG CLIN: HCPCS | Performed by: STUDENT IN AN ORGANIZED HEALTH CARE EDUCATION/TRAINING PROGRAM

## 2025-03-19 PROCEDURE — 3046F HEMOGLOBIN A1C LEVEL >9.0%: CPT | Performed by: STUDENT IN AN ORGANIZED HEALTH CARE EDUCATION/TRAINING PROGRAM

## 2025-03-19 PROCEDURE — 1160F RVW MEDS BY RX/DR IN RCRD: CPT | Performed by: STUDENT IN AN ORGANIZED HEALTH CARE EDUCATION/TRAINING PROGRAM

## 2025-03-19 PROCEDURE — 3074F SYST BP LT 130 MM HG: CPT | Performed by: STUDENT IN AN ORGANIZED HEALTH CARE EDUCATION/TRAINING PROGRAM

## 2025-03-19 PROCEDURE — 3017F COLORECTAL CA SCREEN DOC REV: CPT | Performed by: STUDENT IN AN ORGANIZED HEALTH CARE EDUCATION/TRAINING PROGRAM

## 2025-03-19 PROCEDURE — 99205 OFFICE O/P NEW HI 60 MIN: CPT | Performed by: STUDENT IN AN ORGANIZED HEALTH CARE EDUCATION/TRAINING PROGRAM

## 2025-03-19 PROCEDURE — 2022F DILAT RTA XM EVC RTNOPTHY: CPT | Performed by: STUDENT IN AN ORGANIZED HEALTH CARE EDUCATION/TRAINING PROGRAM

## 2025-03-19 PROCEDURE — G8400 PT W/DXA NO RESULTS DOC: HCPCS | Performed by: STUDENT IN AN ORGANIZED HEALTH CARE EDUCATION/TRAINING PROGRAM

## 2025-03-19 RX ORDER — METOPROLOL TARTRATE 100 MG/1
100 TABLET ORAL 2 TIMES DAILY
COMMUNITY
End: 2025-03-19

## 2025-03-19 RX ORDER — AMOXICILLIN 500 MG
CAPSULE ORAL 2 TIMES DAILY
COMMUNITY

## 2025-03-19 RX ORDER — LIOTHYRONINE SODIUM 5 UG/1
5 TABLET ORAL DAILY
COMMUNITY

## 2025-03-19 RX ORDER — PIOGLITAZONE 15 MG/1
15 TABLET ORAL DAILY
COMMUNITY

## 2025-03-19 RX ORDER — HYDROCHLOROTHIAZIDE 25 MG/1
25 TABLET ORAL DAILY
COMMUNITY

## 2025-03-19 RX ORDER — LOSARTAN POTASSIUM 25 MG/1
25 TABLET ORAL DAILY
COMMUNITY

## 2025-03-19 RX ORDER — ACETAMINOPHEN 160 MG
2000 TABLET,DISINTEGRATING ORAL EVERY OTHER DAY
COMMUNITY

## 2025-03-19 RX ORDER — ASPIRIN 81 MG/1
TABLET ORAL EVERY 24 HOURS
COMMUNITY

## 2025-03-19 RX ORDER — ZINC GLUCONATE 50 MG
50 TABLET ORAL 2 TIMES DAILY
COMMUNITY

## 2025-03-19 RX ORDER — METOPROLOL SUCCINATE 100 MG/1
100 TABLET, EXTENDED RELEASE ORAL 2 TIMES DAILY
COMMUNITY

## 2025-03-19 RX ORDER — PANTOPRAZOLE SODIUM 40 MG/1
40 TABLET, DELAYED RELEASE ORAL DAILY
COMMUNITY

## 2025-03-19 RX ORDER — SIMVASTATIN 20 MG
20 TABLET ORAL NIGHTLY
COMMUNITY

## 2025-03-19 ASSESSMENT — PATIENT HEALTH QUESTIONNAIRE - PHQ9
2. FEELING DOWN, DEPRESSED OR HOPELESS: NOT AT ALL
SUM OF ALL RESPONSES TO PHQ QUESTIONS 1-9: 0
SUM OF ALL RESPONSES TO PHQ QUESTIONS 1-9: 0
1. LITTLE INTEREST OR PLEASURE IN DOING THINGS: NOT AT ALL
SUM OF ALL RESPONSES TO PHQ QUESTIONS 1-9: 0
SUM OF ALL RESPONSES TO PHQ QUESTIONS 1-9: 0

## 2025-03-19 NOTE — PROGRESS NOTES
back surgery last month and wants to further discuss medication list. Patient wants to try and wean off some medications if possible. Last may was patient last bloodwork. Patient wants to discuss HM- DEXA, mammogram. Not sure about getting colonoscopy.     Follow-up of Type 2 diabetes mellitus.   Meds - metformin 500mg BID, actos 15mg  Compliance -good  Home blood sugar records: patient does not test  Any episodes of hypoglycemia? no  Fluctuating blood sugars?no  Weight trend: decreasing steadily  Current diet: in general, a \"healthy\" diet  , well balanced  Current exercise: none  Known diabetic complications: none  Hgb A1c -   Lab Results   Component Value Date    LABA1C 6.7 (H) 05/05/2022   She lost 50-60lbs over time    Hyperlipidemia:  No new myalgias or GI upset on simvastatin (Zocor). Medication compliance: compliant all of the time. Patient is  following a low fat, low cholesterol diet.  She is  exercising regularly.     Lab Results   Component Value Date    HDL 57 05/05/2022     Lab Results   Component Value Date    ALT 17 05/05/2022    AST 17 05/05/2022        Hypertension: Patient here for follow-up of elevated blood pressure. She is exercising and is adherent to low salt diet.  Blood pressure is well controlled at home. Cardiac symptoms none. Patient denies chest pain, chest pressure/discomfort, claudication, dyspnea, exertional chest pressure/discomfort, fatigue, irregular heart beat, lower extremity edema, near-syncope, orthopnea, palpitations, paroxysmal nocturnal dyspnea, syncope, and tachypnea.     Hypothyroidism: Recent symptoms: none. She denies none. Patient is  taking her medication consistently on an empty stomach.  No components found for: \"TSHREFLEX\"  Lab Results   Component Value Date    TSH 3.06 05/05/2022         History of Present Illness    Health Maintenance -   Alcohol/Substance use History - None/Minimal    Tobacco Use      Smoking status: Former        Packs/day: 0.00        Types:

## 2025-03-21 ENCOUNTER — HOSPITAL ENCOUNTER
Dept: HOSPITAL 101 - EC | Age: 70
Discharge: HOME | End: 2025-03-21
Payer: MEDICARE

## 2025-03-21 DIAGNOSIS — Z98.1: Primary | ICD-10-CM

## 2025-03-21 LAB
ALBUMIN: 4.6 G/DL (ref 3.5–5.2)
ALK PHOSPHATASE: 94 U/L (ref 30–146)
ALT SERPL-CCNC: 15 U/L (ref 5–33)
ANION GAP SERPL CALCULATED.3IONS-SCNC: 11 MMOL/L (ref 7–16)
AST SERPL-CCNC: 15 U/L (ref 9–40)
BASOPHILS ABSOLUTE: 0.03 K/UL (ref 0–0.2)
BASOPHILS RELATIVE PERCENT: 0.4 % (ref 0–2)
BILIRUB SERPL-MCNC: 0.6 MG/DL
BUN BLDV-MCNC: 26 MG/DL (ref 8–23)
CALCIUM SERPL-MCNC: 10 MG/DL (ref 8.6–10.5)
CHLORIDE BLD-SCNC: 99 MMOL/L (ref 96–107)
CHOLESTEROL, TOTAL: 154 MG/DL (ref 100–199)
CHOLESTEROL/HDL RATIO: 2.5 (ref 2–4.5)
CO2: 31 MMOL/L (ref 18–32)
CREAT SERPL-MCNC: 1.1 MG/DL (ref 0.51–1.15)
EGFR IF NONAFRICAN AMERICAN: 55 ML/MIN/1.73M2
EOSINOPHILS ABSOLUTE: 0.1 K/UL (ref 0–0.8)
EOSINOPHILS RELATIVE PERCENT: 1.5 % (ref 0–5)
GLUCOSE: 90 MG/DL (ref 70–100)
HCT VFR BLD CALC: 39.9 % (ref 35–47)
HDLC SERPL-MCNC: 61 MG/DL
HEMOGLOBIN: 13.1 G/DL (ref 11.9–16)
IMMATURE GRANS (ABS): 0.02 K/UL (ref 0–0.06)
IMMATURE GRANULOCYTES %: 0.3 % (ref 0–2)
LDL CHOLESTEROL: 71 MG/DL
LDL/HDL RATIO: 1.2
LYMPHOCYTES ABSOLUTE: 1.88 K/UL (ref 0.9–5.2)
LYMPHOCYTES RELATIVE PERCENT: 28.1 % (ref 20–45)
MCH RBC QN AUTO: 30.4 PG (ref 26–33)
MCHC RBC AUTO-ENTMCNC: 32.8 G/DL (ref 32–35)
MCV RBC AUTO: 93 FL (ref 75–100)
MONOCYTES ABSOLUTE: 0.66 K/UL (ref 0.1–1)
MONOCYTES RELATIVE PERCENT: 9.9 % (ref 0–13)
NEUTROPHILS ABSOLUTE: 4 K/UL (ref 1.9–8)
NEUTROPHILS RELATIVE PERCENT: 59.8 % (ref 45–75)
PDW BLD-RTO: 12.5 % (ref 11.2–14.8)
PLATELET # BLD: 385 THOUS/CMM (ref 140–440)
POTASSIUM SERPL-SCNC: 3.9 MMOL/L (ref 3.5–5.4)
RBC # BLD: 4.31 MILL/CMM (ref 3.8–5.2)
SODIUM BLD-SCNC: 141 MMOL/L (ref 135–148)
TOTAL PROTEIN: 6.8 G/DL (ref 6–8.3)
TRIGL SERPL-MCNC: 108 MG/DL (ref 20–149)
TSH SERPL DL<=0.05 MIU/L-ACNC: 2.92 UIU/ML (ref 0.27–4.2)
VITAMIN D 25-HYDROXY: 51.7 NG/ML (ref 30–100)
VLDLC SERPL CALC-MCNC: 22 MG/DL
WBC # BLD: 6.7 THDS/CMM (ref 3.6–11)

## 2025-03-21 PROCEDURE — 72110 X-RAY EXAM L-2 SPINE 4/>VWS: CPT

## 2025-03-21 NOTE — XR_ITS
[address] [address] 
     [phone] 
  
  
Patient Name: 
RACQUEL LOZADA 
  
MRN: [id number]    YOB: 1955    Sex: F 
Assigned Patient Location:  
Current Patient Location:  
Accession/Order Number: [id number] 
Exam Date: 3/21/2025  12:46    Report Date: 3/21/2025  12:47 
  
At the request of: 
ROBERTA JUÁREZ MD 
  
Procedure:  XR lumbar spine min 4V 
  
LUMBAR SPINE - 4 views 
  
CLINICAL HISTORY: Chronic back pain. 
  
COMPARISON: Lumbar spine 1/4/2024 
  
FINDINGS: Posterior hardware fixation L4-S1 without evidence of hardware  
complication.  Vertebral body heights appear maintained.  Scattered endplate  
and facet joint degenerative changes.  No pathological motion on flexion or  
extension views. 
  
  
ORDER #: [id number] XR/XR lumbar spine min 4V  
IMPRESSION:    
   
NO HARDWARE COMPLICATION.   
   
Impression dictated by: Kam Wilhelm Jr. DDeonODeon3/21/2025 12:47 PM  
   
   
Dictation Location: [place] [address]  
   
Electronically authenticated by: [id number]  Y   Date: 3/21/2025  12:47

## 2025-03-22 ENCOUNTER — RESULTS FOLLOW-UP (OUTPATIENT)
Dept: PRIMARY CARE CLINIC | Age: 70
End: 2025-03-22

## 2025-03-22 LAB
ESTIMATED AVERAGE GLUCOSE: 120 MG/DL
HBA1C MFR BLD: 5.8 %

## 2025-03-24 RX ORDER — LEVOTHYROXINE SODIUM 25 UG/1
25 TABLET ORAL DAILY
Qty: 90 TABLET | Refills: 0 | Status: SHIPPED | OUTPATIENT
Start: 2025-03-24

## 2025-04-30 ENCOUNTER — HOSPITAL ENCOUNTER (OUTPATIENT)
Dept: WOMENS IMAGING | Age: 70
Discharge: HOME | End: 2025-05-02
Attending: STUDENT IN AN ORGANIZED HEALTH CARE EDUCATION/TRAINING PROGRAM
Payer: MEDICARE

## 2025-04-30 DIAGNOSIS — Z78.0 POST-MENOPAUSAL: ICD-10-CM

## 2025-04-30 PROCEDURE — 77080 DXA BONE DENSITY AXIAL: CPT

## 2025-05-01 ENCOUNTER — RESULTS FOLLOW-UP (OUTPATIENT)
Dept: INTERNAL MEDICINE CLINIC | Age: 70
End: 2025-05-01

## 2025-05-27 DIAGNOSIS — E78.5 HYPERLIPIDEMIA, UNSPECIFIED HYPERLIPIDEMIA TYPE: ICD-10-CM

## 2025-05-27 DIAGNOSIS — E11.69 TYPE 2 DIABETES MELLITUS WITH OTHER SPECIFIED COMPLICATION, WITHOUT LONG-TERM CURRENT USE OF INSULIN (HCC): Primary | ICD-10-CM

## 2025-05-27 DIAGNOSIS — E55.9 HYPOVITAMINOSIS D: ICD-10-CM

## 2025-05-27 DIAGNOSIS — E03.9 HYPOTHYROIDISM, UNSPECIFIED TYPE: ICD-10-CM

## 2025-05-27 DIAGNOSIS — I10 HYPERTENSION, UNSPECIFIED TYPE: ICD-10-CM

## 2025-06-05 LAB
ALBUMIN: 4.3 G/DL (ref 3.5–5.2)
ALK PHOSPHATASE: 129 U/L (ref 30–146)
ALT SERPL-CCNC: 16 U/L (ref 5–33)
ANION GAP SERPL CALCULATED.3IONS-SCNC: 16 MMOL/L (ref 7–16)
AST SERPL-CCNC: 17 U/L (ref 9–40)
BILIRUB SERPL-MCNC: 0.4 MG/DL
BUN BLDV-MCNC: 28 MG/DL (ref 8–23)
CALCIUM SERPL-MCNC: 9.5 MG/DL (ref 8.6–10.5)
CHLORIDE BLD-SCNC: 100 MMOL/L (ref 96–107)
CHOLESTEROL, TOTAL: 225 MG/DL (ref 100–199)
CHOLESTEROL/HDL RATIO: 3.9 (ref 2–4.5)
CO2: 26 MMOL/L (ref 18–32)
CREAT SERPL-MCNC: 1.08 MG/DL (ref 0.51–1.15)
EGFR IF NONAFRICAN AMERICAN: 56 ML/MIN/1.73M2
GLUCOSE: 114 MG/DL (ref 70–100)
HDLC SERPL-MCNC: 57 MG/DL
LDL CHOLESTEROL: 145 MG/DL
LDL/HDL RATIO: 2.5
POTASSIUM SERPL-SCNC: 4.2 MMOL/L (ref 3.5–5.4)
SODIUM BLD-SCNC: 142 MMOL/L (ref 135–148)
TOTAL PROTEIN: 6.8 G/DL (ref 6–8.3)
TRIGL SERPL-MCNC: 113 MG/DL (ref 20–149)
TSH SERPL DL<=0.05 MIU/L-ACNC: 1.94 UIU/ML (ref 0.27–4.2)
VITAMIN D 25-HYDROXY: 51.8 NG/ML (ref 30–100)
VLDLC SERPL CALC-MCNC: 23 MG/DL

## 2025-06-06 ENCOUNTER — RESULTS FOLLOW-UP (OUTPATIENT)
Dept: PRIMARY CARE CLINIC | Age: 70
End: 2025-06-06

## 2025-06-06 LAB
BASOPHILS ABSOLUTE: 0.05 K/UL (ref 0–0.2)
BASOPHILS RELATIVE PERCENT: 0.7 % (ref 0–2)
EOSINOPHILS ABSOLUTE: 0.15 K/UL (ref 0–0.8)
EOSINOPHILS RELATIVE PERCENT: 2.2 % (ref 0–5)
ESTIMATED AVERAGE GLUCOSE: 126 MG/DL
HBA1C MFR BLD: 6 %
HCT VFR BLD CALC: 41.9 % (ref 35–47)
HEMOGLOBIN: 13.6 G/DL (ref 11.9–16)
IMMATURE GRANS (ABS): 0.03 K/UL (ref 0–0.06)
IMMATURE GRANULOCYTES %: 0.4 % (ref 0–2)
LYMPHOCYTES ABSOLUTE: 1.57 K/UL (ref 0.9–5.2)
LYMPHOCYTES RELATIVE PERCENT: 23.5 % (ref 20–45)
MCH RBC QN AUTO: 30 PG (ref 26–33)
MCHC RBC AUTO-ENTMCNC: 32.5 G/DL (ref 32–35)
MCV RBC AUTO: 93 FL (ref 75–100)
MONOCYTES ABSOLUTE: 0.63 K/UL (ref 0.1–1)
MONOCYTES RELATIVE PERCENT: 9.4 % (ref 0–13)
NEUTROPHILS ABSOLUTE: 4.24 K/UL (ref 1.9–8)
NEUTROPHILS RELATIVE PERCENT: 63.8 % (ref 45–75)
PDW BLD-RTO: 12.7 % (ref 11.2–14.8)
PLATELET # BLD: 385 THOUS/CMM (ref 140–440)
RBC # BLD: 4.53 MILL/CMM (ref 3.8–5.2)
WBC # BLD: 6.7 THDS/CMM (ref 3.6–11)

## 2025-06-19 RX ORDER — LEVOTHYROXINE SODIUM 25 UG/1
25 TABLET ORAL DAILY
Qty: 90 TABLET | Refills: 0 | Status: SHIPPED | OUTPATIENT
Start: 2025-06-19

## 2025-06-24 ENCOUNTER — OFFICE VISIT (OUTPATIENT)
Dept: PRIMARY CARE CLINIC | Age: 70
End: 2025-06-24
Payer: MEDICARE

## 2025-06-24 VITALS
WEIGHT: 211.8 LBS | BODY MASS INDEX: 37.53 KG/M2 | HEART RATE: 54 BPM | DIASTOLIC BLOOD PRESSURE: 80 MMHG | OXYGEN SATURATION: 97 % | RESPIRATION RATE: 18 BRPM | HEIGHT: 63 IN | SYSTOLIC BLOOD PRESSURE: 126 MMHG

## 2025-06-24 DIAGNOSIS — Z00.00 MEDICARE ANNUAL WELLNESS VISIT, SUBSEQUENT: Primary | ICD-10-CM

## 2025-06-24 DIAGNOSIS — I10 HYPERTENSION, UNSPECIFIED TYPE: ICD-10-CM

## 2025-06-24 DIAGNOSIS — E03.9 HYPOTHYROIDISM, UNSPECIFIED TYPE: ICD-10-CM

## 2025-06-24 DIAGNOSIS — E78.5 HYPERLIPIDEMIA, UNSPECIFIED HYPERLIPIDEMIA TYPE: ICD-10-CM

## 2025-06-24 DIAGNOSIS — E11.69 TYPE 2 DIABETES MELLITUS WITH OTHER SPECIFIED COMPLICATION, WITHOUT LONG-TERM CURRENT USE OF INSULIN (HCC): ICD-10-CM

## 2025-06-24 PROCEDURE — 3074F SYST BP LT 130 MM HG: CPT | Performed by: STUDENT IN AN ORGANIZED HEALTH CARE EDUCATION/TRAINING PROGRAM

## 2025-06-24 PROCEDURE — 1160F RVW MEDS BY RX/DR IN RCRD: CPT | Performed by: STUDENT IN AN ORGANIZED HEALTH CARE EDUCATION/TRAINING PROGRAM

## 2025-06-24 PROCEDURE — G8417 CALC BMI ABV UP PARAM F/U: HCPCS | Performed by: STUDENT IN AN ORGANIZED HEALTH CARE EDUCATION/TRAINING PROGRAM

## 2025-06-24 PROCEDURE — 3017F COLORECTAL CA SCREEN DOC REV: CPT | Performed by: STUDENT IN AN ORGANIZED HEALTH CARE EDUCATION/TRAINING PROGRAM

## 2025-06-24 PROCEDURE — 1123F ACP DISCUSS/DSCN MKR DOCD: CPT | Performed by: STUDENT IN AN ORGANIZED HEALTH CARE EDUCATION/TRAINING PROGRAM

## 2025-06-24 PROCEDURE — G8399 PT W/DXA RESULTS DOCUMENT: HCPCS | Performed by: STUDENT IN AN ORGANIZED HEALTH CARE EDUCATION/TRAINING PROGRAM

## 2025-06-24 PROCEDURE — 2022F DILAT RTA XM EVC RTNOPTHY: CPT | Performed by: STUDENT IN AN ORGANIZED HEALTH CARE EDUCATION/TRAINING PROGRAM

## 2025-06-24 PROCEDURE — 1159F MED LIST DOCD IN RCRD: CPT | Performed by: STUDENT IN AN ORGANIZED HEALTH CARE EDUCATION/TRAINING PROGRAM

## 2025-06-24 PROCEDURE — 1090F PRES/ABSN URINE INCON ASSESS: CPT | Performed by: STUDENT IN AN ORGANIZED HEALTH CARE EDUCATION/TRAINING PROGRAM

## 2025-06-24 PROCEDURE — 3044F HG A1C LEVEL LT 7.0%: CPT | Performed by: STUDENT IN AN ORGANIZED HEALTH CARE EDUCATION/TRAINING PROGRAM

## 2025-06-24 PROCEDURE — 1036F TOBACCO NON-USER: CPT | Performed by: STUDENT IN AN ORGANIZED HEALTH CARE EDUCATION/TRAINING PROGRAM

## 2025-06-24 PROCEDURE — G0439 PPPS, SUBSEQ VISIT: HCPCS | Performed by: STUDENT IN AN ORGANIZED HEALTH CARE EDUCATION/TRAINING PROGRAM

## 2025-06-24 PROCEDURE — 99214 OFFICE O/P EST MOD 30 MIN: CPT | Performed by: STUDENT IN AN ORGANIZED HEALTH CARE EDUCATION/TRAINING PROGRAM

## 2025-06-24 PROCEDURE — 3079F DIAST BP 80-89 MM HG: CPT | Performed by: STUDENT IN AN ORGANIZED HEALTH CARE EDUCATION/TRAINING PROGRAM

## 2025-06-24 PROCEDURE — 99211 OFF/OP EST MAY X REQ PHY/QHP: CPT | Performed by: STUDENT IN AN ORGANIZED HEALTH CARE EDUCATION/TRAINING PROGRAM

## 2025-06-24 PROCEDURE — G8427 DOCREV CUR MEDS BY ELIG CLIN: HCPCS | Performed by: STUDENT IN AN ORGANIZED HEALTH CARE EDUCATION/TRAINING PROGRAM

## 2025-06-24 RX ORDER — METOPROLOL TARTRATE 100 MG/1
TABLET ORAL
COMMUNITY
Start: 2025-06-03

## 2025-06-24 RX ORDER — BLOOD SUGAR DIAGNOSTIC
1 STRIP MISCELLANEOUS DAILY
Qty: 100 EACH | Refills: 3 | Status: SHIPPED | OUTPATIENT
Start: 2025-06-24

## 2025-06-24 SDOH — ECONOMIC STABILITY: FOOD INSECURITY: WITHIN THE PAST 12 MONTHS, THE FOOD YOU BOUGHT JUST DIDN'T LAST AND YOU DIDN'T HAVE MONEY TO GET MORE.: NEVER TRUE

## 2025-06-24 SDOH — ECONOMIC STABILITY: FOOD INSECURITY: WITHIN THE PAST 12 MONTHS, YOU WORRIED THAT YOUR FOOD WOULD RUN OUT BEFORE YOU GOT MONEY TO BUY MORE.: NEVER TRUE

## 2025-06-24 ASSESSMENT — PATIENT HEALTH QUESTIONNAIRE - PHQ9
4. FEELING TIRED OR HAVING LITTLE ENERGY: SEVERAL DAYS
SUM OF ALL RESPONSES TO PHQ QUESTIONS 1-9: 3
SUM OF ALL RESPONSES TO PHQ QUESTIONS 1-9: 3
8. MOVING OR SPEAKING SO SLOWLY THAT OTHER PEOPLE COULD HAVE NOTICED. OR THE OPPOSITE, BEING SO FIGETY OR RESTLESS THAT YOU HAVE BEEN MOVING AROUND A LOT MORE THAN USUAL: NOT AT ALL
9. THOUGHTS THAT YOU WOULD BE BETTER OFF DEAD, OR OF HURTING YOURSELF: NOT AT ALL
3. TROUBLE FALLING OR STAYING ASLEEP: SEVERAL DAYS
2. FEELING DOWN, DEPRESSED OR HOPELESS: NOT AT ALL
SUM OF ALL RESPONSES TO PHQ QUESTIONS 1-9: 3
5. POOR APPETITE OR OVEREATING: SEVERAL DAYS
7. TROUBLE CONCENTRATING ON THINGS, SUCH AS READING THE NEWSPAPER OR WATCHING TELEVISION: NOT AT ALL
10. IF YOU CHECKED OFF ANY PROBLEMS, HOW DIFFICULT HAVE THESE PROBLEMS MADE IT FOR YOU TO DO YOUR WORK, TAKE CARE OF THINGS AT HOME, OR GET ALONG WITH OTHER PEOPLE: NOT DIFFICULT AT ALL
6. FEELING BAD ABOUT YOURSELF - OR THAT YOU ARE A FAILURE OR HAVE LET YOURSELF OR YOUR FAMILY DOWN: NOT AT ALL
SUM OF ALL RESPONSES TO PHQ QUESTIONS 1-9: 3
1. LITTLE INTEREST OR PLEASURE IN DOING THINGS: NOT AT ALL

## 2025-06-24 ASSESSMENT — LIFESTYLE VARIABLES
HOW MANY STANDARD DRINKS CONTAINING ALCOHOL DO YOU HAVE ON A TYPICAL DAY: 1 OR 2
HOW OFTEN DO YOU HAVE A DRINK CONTAINING ALCOHOL: MONTHLY OR LESS

## 2025-06-24 NOTE — PROGRESS NOTES
[facility]  [address] [place] [address] , [address]  [place], [place], [address]      Medicare Annual Wellness Visit / Progress Note    Cristy Glez is here for Medicare AWV and Diabetes      Assessment & Plan   Medicare annual wellness visit, subsequent  Type 2 diabetes mellitus with other specified complication, without long-term current use of insulin (HCC)  -     blood glucose test strips (EXACTECH TEST) strip; 1 each by In Vitro route daily As needed., Disp-100 each, R-3Normal  -     Albumin/Creatinine Ratio, Urine; Future  -     CBC with Auto Differential; Future  -     Comprehensive Metabolic Panel; Future  -     Lipid Panel; Future  -     Hemoglobin A1C; Future  Hyperlipidemia, unspecified hyperlipidemia type  Hypothyroidism, unspecified type  -     TSH reflex to FT4; Future  Hypertension, unspecified type       T2DM - at goal, repeat 1c, may consider stopping metformin/actos  Hyperlipidemia - continue w/ zocor at 20mg nightly  HTN - stable and at goal during today's visit, Toprol at 100mg and HCTZ at 25mg and losartan 25mg  Hypothyroidism - at goal, synthroid at 25mcg  Obtain labs  F/U with Specialists per prior plans    Return in 3 months (on 9/24/2025) for F/U Med Management.     Subjective   The following acute and/or chronic problems were also addressed today:    DIABETES MELLITUS:  Medication compliance:  compliant most of the time  Diabetic diet compliance:  compliant most of the time  Current exercise: no regular exercise  Frequency of testing: daily  Home blood sugar records: 100-125  Any episodes of hypoglycemia? no  Eye exam current (within one year): scheduled 6/30/25  Diabetic foot check in the past year Yes  A1c was 6.0% on 6/4/25    Hyperlipidemia:  No new myalgias or GI upset on simvastatin (Zocor). Medication compliance: compliant all of the time. Patient is  following a low fat, low cholesterol diet.  She is  exercising regularly.      Hypertension: Patient here for

## 2025-06-24 NOTE — PATIENT INSTRUCTIONS
Learning About Being Active as an Older Adult  Why is being active important as you get older?     Being active is one of the best things you can do for your health. And it's never too late to start. Being active--or getting active, if you aren't already--has definite benefits. It can:  Give you more energy,  Keep your mind sharp.  Improve balance to reduce your risk of falls.  Help you manage chronic illness with fewer medicines.  No matter how old you are, how fit you are, or what health problems you have, there is a form of activity that will work for you. And the more physical activity you can do, the better your overall health will be.  What kinds of activity can help you stay healthy?  Being more active will make your daily activities easier. Physical activity includes planned exercise and things you do in daily life. There are four types of activity:  Aerobic.  Doing aerobic activity makes your heart and lungs strong.  Includes walking, dancing, and gardening.  Aim for at least 2½ hours spread throughout the week.  It improves your energy and can help you sleep better.  Muscle-strengthening.  This type of activity can help maintain muscle and strengthen bones.  Includes climbing stairs, using resistance bands, and lifting or carrying heavy loads.  Aim for at least twice a week.  It can help protect the knees and other joints.  Stretching.  Stretching gives you better range of motion in joints and muscles.  Includes upper arm stretches, calf stretches, and gentle yoga.  Aim for at least twice a week, preferably after your muscles are warmed up from other activities.  It can help you function better in daily life.  Balancing.  This helps you stay coordinated and have good posture.  Includes heel-to-toe walking, prasad chi, and certain types of yoga.  Aim for at least 3 days a week.  It can reduce your risk of falling.  Even if you have a hard time meeting the recommendations, it's better to be more active

## 2025-07-24 PROBLEM — Z00.00 MEDICARE ANNUAL WELLNESS VISIT, SUBSEQUENT: Status: RESOLVED | Noted: 2025-06-24 | Resolved: 2025-07-24
